# Patient Record
Sex: FEMALE | Race: WHITE | Employment: PART TIME | URBAN - METROPOLITAN AREA
[De-identification: names, ages, dates, MRNs, and addresses within clinical notes are randomized per-mention and may not be internally consistent; named-entity substitution may affect disease eponyms.]

---

## 2018-07-17 ENCOUNTER — HOSPITAL ENCOUNTER (INPATIENT)
Age: 23
LOS: 3 days | Discharge: HOME | DRG: 885 | End: 2018-07-20
Attending: EMERGENCY MEDICINE | Admitting: PSYCHIATRY & NEUROLOGY
Payer: COMMERCIAL

## 2018-07-17 DIAGNOSIS — F39 MOOD DISORDER (HCC): Primary | ICD-10-CM

## 2018-07-17 PROBLEM — F32.A DEPRESSION: Status: ACTIVE | Noted: 2018-07-17

## 2018-07-17 LAB
ACETAMINOPHEN LEVEL: <5 MCG/ML (ref 10–30)
AMPHETAMINE SCREEN, URINE: NOT DETECTED
ANION GAP SERPL CALCULATED.3IONS-SCNC: 12 MMOL/L (ref 7–16)
BARBITURATE SCREEN URINE: NOT DETECTED
BASOPHILS ABSOLUTE: 0.02 E9/L (ref 0–0.2)
BASOPHILS RELATIVE PERCENT: 0.2 % (ref 0–2)
BENZODIAZEPINE SCREEN, URINE: NOT DETECTED
BILIRUBIN URINE: NEGATIVE
BLOOD, URINE: NEGATIVE
BUN BLDV-MCNC: 15 MG/DL (ref 6–20)
CALCIUM SERPL-MCNC: 9.4 MG/DL (ref 8.6–10.2)
CANNABINOID SCREEN URINE: POSITIVE
CHLORIDE BLD-SCNC: 101 MMOL/L (ref 98–107)
CLARITY: CLEAR
CO2: 27 MMOL/L (ref 22–29)
COCAINE METABOLITE SCREEN URINE: NOT DETECTED
COLOR: YELLOW
CREAT SERPL-MCNC: 0.8 MG/DL (ref 0.5–1)
EOSINOPHILS ABSOLUTE: 0.05 E9/L (ref 0.05–0.5)
EOSINOPHILS RELATIVE PERCENT: 0.5 % (ref 0–6)
ETHANOL: <10 MG/DL (ref 0–0.08)
GFR AFRICAN AMERICAN: >60
GFR NON-AFRICAN AMERICAN: >60 ML/MIN/1.73
GLUCOSE BLD-MCNC: 84 MG/DL (ref 74–109)
GLUCOSE URINE: NEGATIVE MG/DL
HCG(URINE) PREGNANCY TEST: NEGATIVE
HCT VFR BLD CALC: 40.3 % (ref 34–48)
HEMOGLOBIN: 13.7 G/DL (ref 11.5–15.5)
IMMATURE GRANULOCYTES #: 0.04 E9/L
IMMATURE GRANULOCYTES %: 0.4 % (ref 0–5)
KETONES, URINE: NEGATIVE MG/DL
LEUKOCYTE ESTERASE, URINE: NEGATIVE
LYMPHOCYTES ABSOLUTE: 2.48 E9/L (ref 1.5–4)
LYMPHOCYTES RELATIVE PERCENT: 23.8 % (ref 20–42)
MCH RBC QN AUTO: 29.7 PG (ref 26–35)
MCHC RBC AUTO-ENTMCNC: 34 % (ref 32–34.5)
MCV RBC AUTO: 87.2 FL (ref 80–99.9)
METHADONE SCREEN, URINE: NOT DETECTED
MONOCYTES ABSOLUTE: 0.73 E9/L (ref 0.1–0.95)
MONOCYTES RELATIVE PERCENT: 7 % (ref 2–12)
NEUTROPHILS ABSOLUTE: 7.12 E9/L (ref 1.8–7.3)
NEUTROPHILS RELATIVE PERCENT: 68.1 % (ref 43–80)
NITRITE, URINE: NEGATIVE
OPIATE SCREEN URINE: NOT DETECTED
PDW BLD-RTO: 11.9 FL (ref 11.5–15)
PH UA: 6 (ref 5–9)
PHENCYCLIDINE SCREEN URINE: NOT DETECTED
PLATELET # BLD: 323 E9/L (ref 130–450)
PMV BLD AUTO: 8.7 FL (ref 7–12)
POTASSIUM REFLEX MAGNESIUM: 4.1 MMOL/L (ref 3.5–5)
PROPOXYPHENE SCREEN: NOT DETECTED
PROTEIN UA: NEGATIVE MG/DL
RBC # BLD: 4.62 E12/L (ref 3.5–5.5)
SALICYLATE, SERUM: <0.3 MG/DL (ref 0–30)
SODIUM BLD-SCNC: 140 MMOL/L (ref 132–146)
SPECIFIC GRAVITY UA: <=1.005 (ref 1–1.03)
T4 TOTAL: 10.5 MCG/DL (ref 4.5–11.7)
TRICYCLIC ANTIDEPRESSANTS SCREEN SERUM: NEGATIVE NG/ML
TSH SERPL DL<=0.05 MIU/L-ACNC: 1.79 UIU/ML (ref 0.27–4.2)
UROBILINOGEN, URINE: 0.2 E.U./DL
WBC # BLD: 10.4 E9/L (ref 4.5–11.5)

## 2018-07-17 PROCEDURE — 81003 URINALYSIS AUTO W/O SCOPE: CPT

## 2018-07-17 PROCEDURE — 81025 URINE PREGNANCY TEST: CPT

## 2018-07-17 PROCEDURE — 80307 DRUG TEST PRSMV CHEM ANLYZR: CPT

## 2018-07-17 PROCEDURE — 80048 BASIC METABOLIC PNL TOTAL CA: CPT

## 2018-07-17 PROCEDURE — G0480 DRUG TEST DEF 1-7 CLASSES: HCPCS

## 2018-07-17 PROCEDURE — 6370000000 HC RX 637 (ALT 250 FOR IP): Performed by: NURSE PRACTITIONER

## 2018-07-17 PROCEDURE — 87088 URINE BACTERIA CULTURE: CPT

## 2018-07-17 PROCEDURE — 84436 ASSAY OF TOTAL THYROXINE: CPT

## 2018-07-17 PROCEDURE — 84443 ASSAY THYROID STIM HORMONE: CPT

## 2018-07-17 PROCEDURE — 1240000000 HC EMOTIONAL WELLNESS R&B

## 2018-07-17 PROCEDURE — 36415 COLL VENOUS BLD VENIPUNCTURE: CPT

## 2018-07-17 PROCEDURE — 99284 EMERGENCY DEPT VISIT MOD MDM: CPT

## 2018-07-17 PROCEDURE — 85025 COMPLETE CBC W/AUTO DIFF WBC: CPT

## 2018-07-17 RX ORDER — ACETAMINOPHEN 325 MG/1
650 TABLET ORAL EVERY 4 HOURS PRN
Status: DISCONTINUED | OUTPATIENT
Start: 2018-07-17 | End: 2018-07-20 | Stop reason: HOSPADM

## 2018-07-17 RX ORDER — MAGNESIUM HYDROXIDE/ALUMINUM HYDROXICE/SIMETHICONE 120; 1200; 1200 MG/30ML; MG/30ML; MG/30ML
30 SUSPENSION ORAL PRN
Status: DISCONTINUED | OUTPATIENT
Start: 2018-07-17 | End: 2018-07-20 | Stop reason: HOSPADM

## 2018-07-17 RX ORDER — TRAZODONE HYDROCHLORIDE 50 MG/1
50 TABLET ORAL NIGHTLY PRN
Status: DISCONTINUED | OUTPATIENT
Start: 2018-07-17 | End: 2018-07-20 | Stop reason: HOSPADM

## 2018-07-17 RX ORDER — NICOTINE 21 MG/24HR
1 PATCH, TRANSDERMAL 24 HOURS TRANSDERMAL DAILY
Status: DISCONTINUED | OUTPATIENT
Start: 2018-07-17 | End: 2018-07-18

## 2018-07-17 RX ORDER — HALOPERIDOL 5 MG/ML
10 INJECTION INTRAMUSCULAR EVERY 6 HOURS PRN
Status: DISCONTINUED | OUTPATIENT
Start: 2018-07-17 | End: 2018-07-20 | Stop reason: HOSPADM

## 2018-07-17 RX ORDER — HYDROXYZINE PAMOATE 50 MG/1
50 CAPSULE ORAL EVERY 6 HOURS PRN
Status: DISCONTINUED | OUTPATIENT
Start: 2018-07-17 | End: 2018-07-20 | Stop reason: HOSPADM

## 2018-07-17 RX ORDER — OLANZAPINE 10 MG/1
10 TABLET ORAL
Status: ACTIVE | OUTPATIENT
Start: 2018-07-17 | End: 2018-07-17

## 2018-07-17 RX ORDER — BENZTROPINE MESYLATE 1 MG/ML
2 INJECTION INTRAMUSCULAR; INTRAVENOUS 2 TIMES DAILY PRN
Status: DISCONTINUED | OUTPATIENT
Start: 2018-07-17 | End: 2018-07-20 | Stop reason: HOSPADM

## 2018-07-17 ASSESSMENT — SLEEP AND FATIGUE QUESTIONNAIRES
DIFFICULTY FALLING ASLEEP: YES
AVERAGE NUMBER OF SLEEP HOURS: 3
DIFFICULTY ARISING: NO
DIFFICULTY STAYING ASLEEP: NO
DO YOU HAVE DIFFICULTY SLEEPING: YES
RESTFUL SLEEP: YES
SLEEP PATTERN: DIFFICULTY FALLING ASLEEP
DO YOU USE A SLEEP AID: YES

## 2018-07-17 ASSESSMENT — LIFESTYLE VARIABLES: HISTORY_ALCOHOL_USE: NO

## 2018-07-17 ASSESSMENT — PAIN SCALES - GENERAL: PAINLEVEL_OUTOF10: 0

## 2018-07-17 NOTE — ED PROVIDER NOTES
1.50 - 4.00 E9/L    Monocytes # 0.73 0.10 - 0.95 E9/L    Eosinophils # 0.05 0.05 - 0.50 E9/L    Basophils # 0.02 0.00 - 0.20 M5/S   Basic Metabolic Panel w/ Reflex to MG   Result Value Ref Range    Sodium 140 132 - 146 mmol/L    Potassium reflex Magnesium 4.1 3.5 - 5.0 mmol/L    Chloride 101 98 - 107 mmol/L    CO2 27 22 - 29 mmol/L    Anion Gap 12 7 - 16 mmol/L    Glucose 84 74 - 109 mg/dL    BUN 15 6 - 20 mg/dL    CREATININE 0.8 0.5 - 1.0 mg/dL    GFR Non-African American >60 >=60 mL/min/1.73    GFR African American >60     Calcium 9.4 8.6 - 10.2 mg/dL   Urinalysis   Result Value Ref Range    Color, UA Yellow Straw/Yellow    Clarity, UA Clear Clear    Glucose, Ur Negative Negative mg/dL    Bilirubin Urine Negative Negative    Ketones, Urine Negative Negative mg/dL    Specific Gravity, UA <=1.005 1.005 - 1.030    Blood, Urine Negative Negative    pH, UA 6.0 5.0 - 9.0    Protein, UA Negative Negative mg/dL    Urobilinogen, Urine 0.2 <2.0 E.U./dL    Nitrite, Urine Negative Negative    Leukocyte Esterase, Urine Negative Negative   Pregnancy, urine   Result Value Ref Range    HCG(Urine) Pregnancy Test NEGATIVE NEGATIVE   TSH without Reflex   Result Value Ref Range    TSH 1.790 0.270 - 4.200 uIU/mL   T4   Result Value Ref Range    T4, Total 10.5 4.5 - 11.7 mcg/dL   Serum Drug Screen   Result Value Ref Range    Ethanol Lvl <10 mg/dL    Acetaminophen Level <5.0 (L) 10.0 - 99.6 mcg/mL    Salicylate, Serum <0.2 0.0 - 30.0 mg/dL    TCA Scrn NEGATIVE Cutoff:300 ng/mL   Urine Drug Screen   Result Value Ref Range    Amphetamine Screen, Urine NOT DETECTED Negative <1000 ng/mL    Barbiturate Screen, Ur NOT DETECTED Negative < 200 ng/mL    Benzodiazepine Screen, Urine NOT DETECTED Negative < 200 ng/mL    Cannabinoid Scrn, Ur POSITIVE (A) Negative < 50ng/mL    Cocaine Metabolite Screen, Urine NOT DETECTED Negative < 300 ng/mL    Opiate Scrn, Ur NOT DETECTED Negative < 300ng/mL    PCP Screen, Urine NOT DETECTED Negative < 25 ng/mL Methadone Screen, Urine NOT DETECTED Negative <300 ng/mL    Propoxyphene Scrn, Ur NOT DETECTED Negative <300 ng/mL   Lipid panel   Result Value Ref Range    Cholesterol, Total 126 0 - 199 mg/dL    Triglycerides 58 0 - 149 mg/dL    HDL 37 >40 mg/dL    LDL Calculated 77 0 - 99 mg/dL    VLDL Cholesterol Calculated 12 mg/dL   Hemoglobin A1c   Result Value Ref Range    Hemoglobin A1C 5.1 4.0 - 5.6 %   Cannabinoid, Urine   Result Value Ref Range    Cannabinoids Conf Ur 220 ng/mL       RADIOLOGY:  Interpreted by Radiologist.  No orders to display             ------------------------- NURSING NOTES AND VITALS REVIEWED ---------------------------   The nursing notes within the ED encounter and vital signs as below have been reviewed by myself. /70   Pulse 69   Temp 98.3 °F (36.8 °C) (Oral)   Resp 12   Ht 5' 6\" (1.676 m)   Wt 136 lb 6 oz (61.9 kg)   LMP 06/23/2018 (Within Days)   SpO2 99%   BMI 22.01 kg/m²   Oxygen Saturation Interpretation: Normal    The patients available past medical records and past encounters were reviewed. ------------------------------ ED COURSE/MEDICAL DECISION MAKING----------------------  Medications   OLANZapine (ZYPREXA) tablet 10 mg (not administered)       Medical Decision Making:    Labs and EKG obtained and reviewed. Consult placed to Social Work. Pt is medically cleared. Disposition as per consultant. This patient's ED course included: a personal history and physicial examination and re-evaluation prior to disposition    This patient has remained hemodynamically stable during their ED course. Re-Evaluations:           Re-evaluation. Patients symptoms show no change    Consultations:             Social Work    Counseling: The emergency provider has spoken with the patient and discussed todays results, in addition to providing specific details for the plan of care and counseling regarding the diagnosis and prognosis.   Questions are answered at this time and they are agreeable with the plan.       --------------------------------- IMPRESSION AND DISPOSITION ---------------------------------    IMPRESSION  1. Mood disorder ([facility] [address]        DISPOSITION  Disposition: as per consultant  Patient condition is stable    7/17/18, 9:06 AM.    This note is prepared by Tomie Goldberg, acting as Scribe for Katarina Peña MD.    Katarina Peña MD:  The scribe's documentation has been prepared under my direction and personally reviewed by me in its entirety. I confirm that the note above accurately reflects all work, treatment, procedures, and medical decision making performed by me.              Katarina Peña MD  07/25/18 6218

## 2018-07-17 NOTE — ED NOTES
PT came to the ED by EMS, pink lsipenayan by [facility] [employer] indicating that pt was unable to make an informed decision and appeared to be manic with erratic thoughts and is unable to care for herself. EMS reported that they picked up pt from home, after she had walked from a gas station in [facility]. EMS said that the boyfriend has dropped pt off last night at the gas station. [place] nurse has been working with pt and assessing for needs. To obtain collateral information, I called mom Gilda Mak, mom [phone]. Mom reports that the family has been trying to get pt properly diagnosed so that \"we can help her\". Pt has no current MH services, treated with [facility] in April 1x and never returned. Recently pt was living with her bf and his family and started to steal pills from them. Pt has a hx of drug use, completed the [employer] in 2015. Mom fears that pt has been using again. Pt has been living with \"different people in different places. \"  Mom reports that pt's bf Maverick Wyman picked her up from a known drug dealers house last evening, spend a few hours with her and then she refused for him to bring her home. Maverick Wyman dropped her off at [facility], because she threatened to kill herself if he took her home. Mom said that pt called her at 2am after she had walked to [employer], and said that she wanted to go get her phone [facility] [employer] [facility] then asked to go home to get sleep. However, mom said \"we have had it\" with her behaviors and they do not permit her to stay in the house without them being home. Mom reports that pt has a hx of talking about ending her life - \"just talk\", but fears that pt is unstable and angry and can benefit from treatment at this time.   Mom said that Maverick Wyman shared with her that there were some concerning text messages that he saw in her phone; \"I have some coke and uppers, meet me downtown\", etc.    Pt has had no previous attempts and no previous

## 2018-07-17 NOTE — ED NOTES
Pt is alert and oriented x4, cooperative. Pt is anxious, tearful at times which she states is due to frustration, not depression. Denies SI/HI. Denies A/V hallucinations. Pt states her mom woke her up early this morning and she only had 2 hours of sleep. Pt denies mental health history. She graduated from [facility] for marijuana use, but still uses marijuana. 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 Pt does not want any contact with her parents at this time. No complaints at this time. Will continue to monitor.       Paola Glynn RN  07/17/18 0332

## 2018-07-17 NOTE — ED NOTES
Bed: [facility]  Expected date:   Expected time:   Means of arrival:   Comments:  ems     Supa García RN  07/17/18 3906

## 2018-07-18 PROBLEM — F31.13 BIPOLAR AFFECTIVE DISORDER, MANIC, SEVERE (HCC): Status: ACTIVE | Noted: 2018-07-18

## 2018-07-18 LAB
CHOLESTEROL, TOTAL: 126 MG/DL (ref 0–199)
HBA1C MFR BLD: 5.1 % (ref 4–5.6)
HDLC SERPL-MCNC: 37 MG/DL
LDL CHOLESTEROL CALCULATED: 77 MG/DL (ref 0–99)
TRIGL SERPL-MCNC: 58 MG/DL (ref 0–149)
VLDLC SERPL CALC-MCNC: 12 MG/DL

## 2018-07-18 PROCEDURE — 36415 COLL VENOUS BLD VENIPUNCTURE: CPT

## 2018-07-18 PROCEDURE — 99222 1ST HOSP IP/OBS MODERATE 55: CPT | Performed by: PSYCHIATRY & NEUROLOGY

## 2018-07-18 PROCEDURE — 83036 HEMOGLOBIN GLYCOSYLATED A1C: CPT

## 2018-07-18 PROCEDURE — 1240000000 HC EMOTIONAL WELLNESS R&B

## 2018-07-18 PROCEDURE — 6370000000 HC RX 637 (ALT 250 FOR IP): Performed by: PSYCHIATRY & NEUROLOGY

## 2018-07-18 PROCEDURE — 80061 LIPID PANEL: CPT

## 2018-07-18 PROCEDURE — 6370000000 HC RX 637 (ALT 250 FOR IP): Performed by: NURSE PRACTITIONER

## 2018-07-18 RX ORDER — RISPERIDONE 0.5 MG/1
0.25 TABLET, FILM COATED ORAL 2 TIMES DAILY
Status: DISCONTINUED | OUTPATIENT
Start: 2018-07-18 | End: 2018-07-19

## 2018-07-18 RX ADMIN — RISPERIDONE 0.25 MG: 0.5 TABLET, FILM COATED ORAL at 22:24

## 2018-07-18 RX ADMIN — NICOTINE POLACRILEX 2 MG: 2 GUM, CHEWING BUCCAL at 20:36

## 2018-07-18 RX ADMIN — NICOTINE POLACRILEX 2 MG: 2 GUM, CHEWING BUCCAL at 11:25

## 2018-07-18 RX ADMIN — RISPERIDONE 0.25 MG: 0.5 TABLET, FILM COATED ORAL at 13:14

## 2018-07-18 RX ADMIN — NICOTINE POLACRILEX 2 MG: 2 GUM, CHEWING BUCCAL at 10:14

## 2018-07-18 RX ADMIN — HYDROXYZINE PAMOATE 50 MG: 50 CAPSULE ORAL at 20:36

## 2018-07-18 RX ADMIN — NICOTINE POLACRILEX 2 MG: 2 GUM, CHEWING BUCCAL at 17:32

## 2018-07-18 RX ADMIN — NICOTINE POLACRILEX 2 MG: 2 GUM, CHEWING BUCCAL at 13:14

## 2018-07-18 RX ADMIN — NICOTINE POLACRILEX 2 MG: 2 GUM, CHEWING BUCCAL at 09:39

## 2018-07-18 ASSESSMENT — LIFESTYLE VARIABLES: HISTORY_ALCOHOL_USE: NO

## 2018-07-18 ASSESSMENT — SLEEP AND FATIGUE QUESTIONNAIRES
RESTFUL SLEEP: NO
DO YOU USE A SLEEP AID: NO
AVERAGE NUMBER OF SLEEP HOURS: 5
DIFFICULTY STAYING ASLEEP: NO
DIFFICULTY ARISING: NO
DO YOU HAVE DIFFICULTY SLEEPING: YES
SLEEP PATTERN: DIFFICULTY FALLING ASLEEP
DIFFICULTY FALLING ASLEEP: YES

## 2018-07-18 ASSESSMENT — PAIN SCALES - GENERAL: PAINLEVEL_OUTOF10: 0

## 2018-07-18 NOTE — H&P
[x] Tongue is midline no deviation or atrophy       For further PE refer to ED note    MENTAL STATUS EXAM:       Mental Status Examination:    Cognition:      [x] Alert  [x] Awake  [x] Oriented  [x] Person  [x] Place [x] Time      [] drowsy  [] tired  [] lethargic  [] distractable  []     Attention/Concentration:   [x] Attentive  [] Distracted        Memory Recent and Remote: [x] Intact   [] Impaired [] Partially Impaired     Language: [] Able to recognize and name objects          [] Unable to recognize and name Objects    Fund of Knowledge:  [] Poor []  Fair  [] Good    Speech: [] Normal  [] Soft  [] Slow  [x] Fast [] Pressured            [] Loud [] Dysarthria  [] Incoherent       Appearance: [] Well Groomed  [] Casual Dressed  [] Unkept  [x] Disheveled          [] Normal weight[] Thin  [] Overweight  [] Obese           Attitude: [] Positive  [] Hostile  [] Demanding  [] Guarded  [] Defensive         [x] Cooperative  []  Uncooperative      Behavior:  [x] Normal Gait  [] Walks with Assistance  [] Katluise Yovana    [] Walks with Scharlene Minors  [] In Hospital Bed  [] Sitting in Chair    Muscle-Skeletal:  [x] Normal Muscle Tone [] Muscle Atrophy       [] Abnormal Muscle Movement     Eye Contact:  [x] Good eye contact  [] Intermittent Eye Contact  [] Poor Eye Contact     Mood: [] Depressed  [x] Anxious  [] Irritated  [] Euthymic   [] Angry [x] Restless    Affect:  [] Congruent  [] Incongruent  [x] Labile  [] Constricted  [] Flat  [] Bizarre     Thought Process and Association:  [] Logical [] Illogical       [] Linear and Goal Directed  [x] Tangential  [] Circumstantial     Thought Content:  [] Denies [] Endorses [] Suicidal [] Homicidal  [x] Delusional      [x] Paranoid  [] Somatic  [] Grandiose    Perception: [x]  None  [] Auditory   [] Visual  [] tactile   [] olfactory  [] Illusions         Insight: [] Intact  [] Fair  [x] Limited    Judgement:  [] Intact  [] Fair  [x] Limited        ASSESSMENT    Patient Active Problem List

## 2018-07-18 NOTE — CARE COORDINATION
SW Note: d/c planning: Sw met with pt to complete biopsychosocial assessment along with C-SSRS. Pts mood was euphoric and affect congruent. Pt is very talkative and can get off topic easily but answered all the questions with no problem. Denies any SI/HI and A/V hallucinations. Pt came to the hospital and was pink slipped by [address] police. Police stated she was acting manic and could not take care of herself. Pt states she takes care of herself perfectly fine, lives with different friends, and has all her basic needs. Pt states going to one appointment in the past because she had to, at [facility] [place]. Pt did like her counselor there and would like to return. Pt is currently a Nanny. Pt does not want her mom or ex, Edwar Garcia, to know information about her at this time. She did give her ex the PIN number and is afraid he gave it to her mother. Pt gave ex permission to come during visiting hours but not her mother. Pt is motivated for treatment but says \"God can help me with everything I need\".     Social Work Intern, Bryson Smart

## 2018-07-19 LAB — URINE CULTURE, ROUTINE: NORMAL

## 2018-07-19 PROCEDURE — 6370000000 HC RX 637 (ALT 250 FOR IP): Performed by: NURSE PRACTITIONER

## 2018-07-19 PROCEDURE — 6370000000 HC RX 637 (ALT 250 FOR IP): Performed by: PSYCHIATRY & NEUROLOGY

## 2018-07-19 PROCEDURE — 1240000000 HC EMOTIONAL WELLNESS R&B

## 2018-07-19 PROCEDURE — 99232 SBSQ HOSP IP/OBS MODERATE 35: CPT | Performed by: PSYCHIATRY & NEUROLOGY

## 2018-07-19 RX ADMIN — HYDROXYZINE PAMOATE 50 MG: 50 CAPSULE ORAL at 20:17

## 2018-07-19 RX ADMIN — SERTRALINE 25 MG: 50 TABLET, FILM COATED ORAL at 11:19

## 2018-07-19 RX ADMIN — NICOTINE POLACRILEX 2 MG: 2 GUM, CHEWING BUCCAL at 10:54

## 2018-07-19 RX ADMIN — NICOTINE POLACRILEX 2 MG: 2 GUM, CHEWING BUCCAL at 16:18

## 2018-07-19 RX ADMIN — NICOTINE POLACRILEX 2 MG: 2 GUM, CHEWING BUCCAL at 08:20

## 2018-07-19 RX ADMIN — NICOTINE POLACRILEX 2 MG: 2 GUM, CHEWING BUCCAL at 18:43

## 2018-07-19 RX ADMIN — NICOTINE POLACRILEX 2 MG: 2 GUM, CHEWING BUCCAL at 23:04

## 2018-07-19 RX ADMIN — HYDROXYZINE PAMOATE 50 MG: 50 CAPSULE ORAL at 10:54

## 2018-07-19 RX ADMIN — NICOTINE POLACRILEX 2 MG: 2 GUM, CHEWING BUCCAL at 20:17

## 2018-07-19 NOTE — PROGRESS NOTES
Patient attended community meeting. Shared daily goal and participated in morning stretch/exercise. Identified goal as \" Give advice to help others positively\". Group Therapy Note    Date: 7/19/2018  Start Time:  11:00 am  End Time:  11:55 am  Number of Participants: 8    Type of Group: Psychoeducation    Wellness Binder Information  Module Name:  Increasing Motivation  Session Number:  N/A    Patient's Goal:  Patient will identify positive changes to increase motivation in wellness recovery. Notes:  Positively participated in discussion. Able to identify one specific action to achieve today. Offered kindness and support to peers. Status After Intervention:  Improved    Participation Level:  Active Listener and Interactive    Participation Quality: Appropriate, Attentive and Sharing      Speech:  normal      Thought Process/Content: Logical      Affective Functioning: Congruent      Mood: euthymic      Level of consciousness:  Alert and Attentive      Response to Learning: Capable of insight, Able to change behavior and Progressing to goal      Endings: None Reported    Modes of Intervention: Education, Support, Socialization and Exploration      Discipline Responsible: Psychoeducational Specialist      Signature:  Sharad Laguerre, [address]

## 2018-07-19 NOTE — PROGRESS NOTES
Date: 7/19/2018  Start Time: 100  End Time:  150  Number of Participants: 10     Type of Group: Psychotherapy     Wellness Binder Information  Module Name:  n/a  Session Number: n/a     Patient's Goal: To increase social interaction and relationships with others.     Notes:  Pt was active and verbal during group. He was able to relate to other members and provide support as well as first hand experience regarding relationships and forgiveness.     Status After Intervention:  Improved     Participation Level:  Active Listener     Participation Quality: Appropriate, Attentive, Sharing and Supportive     Speech:  normal     Thought Process/Content: Logical     Affective Functioning: Congruent     Mood: euthymic     Level of consciousness:  Alert, Oriented x4 and Attentive     Response to Learning: Able to verbalize current knowledge/experience, Able to retain information and Capable of insight     Endings: None Reported     Modes of Intervention: Support and Socialization     Discipline Responsible: /Counselor

## 2018-07-20 VITALS
SYSTOLIC BLOOD PRESSURE: 113 MMHG | RESPIRATION RATE: 12 BRPM | HEART RATE: 69 BPM | DIASTOLIC BLOOD PRESSURE: 70 MMHG | HEIGHT: 66 IN | BODY MASS INDEX: 21.92 KG/M2 | OXYGEN SATURATION: 99 % | WEIGHT: 136.38 LBS | TEMPERATURE: 98.3 F

## 2018-07-20 PROCEDURE — 99238 HOSP IP/OBS DSCHRG MGMT 30/<: CPT | Performed by: PSYCHIATRY & NEUROLOGY

## 2018-07-20 PROCEDURE — 6370000000 HC RX 637 (ALT 250 FOR IP): Performed by: PSYCHIATRY & NEUROLOGY

## 2018-07-20 PROCEDURE — 6370000000 HC RX 637 (ALT 250 FOR IP): Performed by: NURSE PRACTITIONER

## 2018-07-20 RX ORDER — SERTRALINE HYDROCHLORIDE 25 MG/1
25 TABLET, FILM COATED ORAL DAILY
Qty: 30 TABLET | Refills: 0 | Status: SHIPPED | OUTPATIENT
Start: 2018-07-21 | End: 2022-08-09

## 2018-07-20 RX ORDER — HYDROXYZINE PAMOATE 50 MG/1
50 CAPSULE ORAL EVERY 6 HOURS PRN
Qty: 60 CAPSULE | Refills: 0 | Status: SHIPPED | OUTPATIENT
Start: 2018-07-20 | End: 2018-08-03

## 2018-07-20 RX ADMIN — NICOTINE POLACRILEX 2 MG: 2 GUM, CHEWING BUCCAL at 13:09

## 2018-07-20 RX ADMIN — SERTRALINE 25 MG: 50 TABLET, FILM COATED ORAL at 08:00

## 2018-07-20 RX ADMIN — HYDROXYZINE PAMOATE 50 MG: 50 CAPSULE ORAL at 08:31

## 2018-07-20 RX ADMIN — NICOTINE POLACRILEX 2 MG: 2 GUM, CHEWING BUCCAL at 10:52

## 2018-07-20 RX ADMIN — NICOTINE POLACRILEX 2 MG: 2 GUM, CHEWING BUCCAL at 08:31

## 2018-07-20 NOTE — PROGRESS NOTES
[facility]  Discharge Note    Pt discharged with followings belongings:   Dentures: None  Vision - Corrective Lenses: None  Hearing Aid: None  Jewelry: Ring, Earrings  Body Piercings Removed: N/A  Clothing: Other (Comment) (necklace rope)  Were All Patient Medications Collected?: Not Applicable  Other Valuables: Other (Comment)   Valuables sent home with yes. Valuables retrieved from safe, Security envelope number:  n/a and returned to patient. Patient left department with Departure Mode: With parents via Mobility at Departure: Ambulatory, discharged to Discharged to: Private Residence. Patient education on aftercare instructions: yes  Information faxed to n/a by n/a Patient verbalize understanding of AVS:  yes.     Status EXAM upon discharge:  Status and Exam  Normal: No  Facial Expression: Worried  Affect: Congruent  Level of Consciousness: Alert  Mood:Normal: No  Mood: Anxious  Motor Activity:Normal: Yes  Motor Activity: Decreased  Interview Behavior: Cooperative  Preception: [place] to Person, [place] to Time, [place] to Place, [place] to Situation  Attention:Normal: Yes  Attention: Distractible  Thought Processes: Circumstantial  Thought Content:Normal: Yes  Thought Content: Ideas of Influence  Hallucinations: None  Delusions: No  Memory:Normal: Yes  Insight and Judgment: No  Insight and Judgment: Poor Insight  Present Suicidal Ideation: No  Present Homicidal Ideation: No    Aicha Hodges RN

## 2018-07-20 NOTE — PROGRESS NOTES
Group Therapy Note    Date: 7/20/2018  Start Time: 1000  End Time:  1050  Number of Participants:  13    Type of Group: Psychotherapy    Wellness Binder Information  Module Name:  n/a  Session Number:  n/a    Patient's Goal:  To increase social interaction and relationships with others. Notes:  Pt was active and verbal during group. Pt was able to relate to other members. Status After Intervention:  Improved    Participation Level:  Active Listener and Interactive    Participation Quality: Appropriate, Attentive, Sharing and Supportive      Speech:  normal      Thought Process/Content: Logical      Affective Functioning: Congruent      Mood: euthymic      Level of consciousness:  Alert, Oriented x4 and Attentive      Response to Learning: Able to verbalize current knowledge/experience and Able to retain information      Endings: None Reported    Modes of Intervention: Support and Socialization      Discipline Responsible: /Counselor      Signature:  KATY Holt, BERHANE

## 2018-07-20 NOTE — CARE COORDINATION
Phone call to pts father who stated he does have some concerns of pts drug use but that they are hoping to address this once she comes home. Pts father is willing to pick her up at 0 and will allow her to stay at the family home. sw informed him of pts upcoming appts.

## 2018-07-20 NOTE — PLAN OF CARE
[address] [place] [address]  Day 3 Interdisciplinary Treatment Plan NOTE    Review Date & Time: 07/20/18    Patient was in treatment team    Admission Type:   Admission Type: Involuntary    Reason for admission:  Reason for Admission: \"my mom woke me up said she would take me somewhere. I didn't want to lose my wifi. I wanted to go somewhere safe before I left there. Then she called the . \"   Estimated Length of Stay Update:  5-7 days  Estimated Discharge Date Update: 7/25/18    PATIENT STRENGTHS:  Patient Strengths Strengths: Communication, Motivated, Social Skills  Patient Strengths and Limitations:Limitations: Unrealistic self-view  Addictive Behavior:Addictive Behavior  In the past 3 months, have you felt or has someone told you that you have a problem with:  : None  Do you have a history of Chemical Use?: No  Do you have a history of Alcohol Use?: No  Do you have a history of Street Drug Abuse?: Yes  Histroy of Prescripton Drug Abuse?: No  Medical Problems:  Past Medical History:   Diagnosis Date    Pneumonia        Risk:  Fall RiskTotal: 65  Clint Scale Clint Scale Score: 22  BVC Total: 0  Change in scores No. Changes to plan of Care  No    Status EXAM:   Status and Exam  Normal: No  Facial Expression: Worried  Affect: Congruent  Level of Consciousness: Alert  Mood:Normal: No  Mood: Anxious  Motor Activity:Normal: Yes  Motor Activity: Decreased  Interview Behavior: Cooperative  Preception: [place] to Person, [place] to Time, [place] to Place, [place] to Situation  Attention:Normal: Yes  Attention: Distractible  Thought Processes: Circumstantial  Thought Content:Normal: Yes  Thought Content: Ideas of Influence  Hallucinations: None  Delusions: No  Memory:Normal: Yes  Insight and Judgment: No  Insight and Judgment: Poor Insight  Present Suicidal Ideation: No  Present Homicidal Ideation: No    Daily Assessment Last Entry:   Daily Sleep (WDL): Within Defined Limits         Patient Currently in Pain: Unable to Assess (Resting in bed apparently asleep)  Daily Nutrition (WDL): Within Defined Limits    Patient Monitoring:  Frequency of Checks: 4 times per hour, close    Psychiatric Symptoms:   Depression Symptoms  Depression Symptoms: No problems reported or observed. Anxiety Symptoms  Anxiety Symptoms: Generalized  Elaine Symptoms  Elaine Symptoms: No problems reported or observed. Psychosis Symptoms  Hallucination Type: No problems reported or observed. Delusion Type: No problems reported or observed. Suicide Risk CSSR-S:  1) Within the past month, have you wished you were dead or wished you could go to sleep and not wake up? : NO  2) Within the past month, have you actually had any thoughts of killing yourself? : NO  6)  Have you ever done anything, started to do anything, or prepared to do anything to end your life?: NO  Change in Result No Change in Plan of care No      EDUCATION:   Learner Progress Toward Treatment Goals: Reviewed results and recommendations of this team, Reviewed group plan and strategies, Reviewed signs, symptoms and risk of self harm and violent behavior and Reviewed goals and plan of care    Method: Individual    Outcome: Verbalized understanding and Demonstrated Understanding    PATIENT GOALS: \"keep anxiety down and help others with anxiety\"    PLAN/TREATMENT RECOMMENDATIONS UPDATE: offer and encourage groups and provide emotional support. GOALS UPDATE:   Time frame for Short-Term Goals:   One week        Kasandra aRy RN

## 2018-07-20 NOTE — PLAN OF CARE
Problem: Altered Mood, Depressive Behavior:  Goal: Able to verbalize acceptance of life and situations over which he or she has no control  Able to verbalize acceptance of life and situations over which he or she has no control   Outcome: Ongoing  Pt resting in bed apparently asleep with easy even respirations at  q 15 min electronic rounding. 11-7am shift note:  Pt observed to be asleep with easy even respirations at  rounds since 2300    Problem: Depressive Behavior With or Without Suicide Precautions:  Goal: Able to verbalize acceptance of life and situations over which he or she has no control  Able to verbalize acceptance of life and situations over which he or she has no control   Outcome: Ongoing  Pt resting in bed apparently asleep with easy even respirations at  q 15 min electronic rounding.   11-7am shift note:  Pt observed to be asleep with easy even respirations at  rounds since 2300

## 2018-07-20 NOTE — PLAN OF CARE
Problem: Altered Mood, Depressive Behavior:  Goal: Able to verbalize acceptance of life and situations over which he or she has no control  Able to verbalize acceptance of life and situations over which he or she has no control   Outcome: Met This Shift    Goal: Able to verbalize and/or display a decrease in depressive symptoms  Able to verbalize and/or display a decrease in depressive symptoms   Outcome: Met This Shift      Problem: Depressive Behavior With or Without Suicide Precautions:  Goal: Able to verbalize acceptance of life and situations over which he or she has no control  Able to verbalize acceptance of life and situations over which he or she has no control   Outcome: Met This Shift      Comments: Pt. Denies SI, HI, and hallucinations this shift. Pt. Denies physical complaints currently.

## 2018-07-20 NOTE — PLAN OF CARE
Problem: Altered Mood, Depressive Behavior:  Goal: Able to verbalize acceptance of life and situations over which he or she has no control  Able to verbalize acceptance of life and situations over which he or she has no control   Outcome: Ongoing      Problem: Depressive Behavior With or Without Suicide Precautions:  Goal: Able to verbalize acceptance of life and situations over which he or she has no control  Able to verbalize acceptance of life and situations over which he or she has no control   Outcome: Ongoing      Comments: 7p- 11p note    Patient up and active on unit, appropriately groomed and attired. Patient is bright, friendly and sociable. Positive and appropriate interactions with staff and peers. Calm and cooperative with staff and care. Denies thoughts or intent to harm self or others at this time. Denies audio or visual hallucinations at this time. Reports no concerns or complaints, no signs or symptoms of distress or discomfort. Able to maintain control of behaviors and emotions. Patient is encouraged to continue to work towards discharge goal by complying with medications, attending groups and to seek staff if feelings are overwhelming. Environmental rounds completed per unit policy to maintain safety of everyone on the unit. Staff will offer support and interventions as requested or required.

## 2018-07-21 LAB — CANNABINOIDS CONF, URINE: 220 NG/ML

## 2020-06-26 ENCOUNTER — HOSPITAL ENCOUNTER (OUTPATIENT)
Age: 25
Discharge: HOME | End: 2020-06-28
Payer: COMMERCIAL

## 2020-06-26 PROCEDURE — U0003 INFECTIOUS AGENT DETECTION BY NUCLEIC ACID (DNA OR RNA); SEVERE ACUTE RESPIRATORY SYNDROME CORONAVIRUS 2 (SARS-COV-2) (CORONAVIRUS DISEASE [COVID-19]), AMPLIFIED PROBE TECHNIQUE, MAKING USE OF HIGH THROUGHPUT TECHNOLOGIES AS DESCRIBED BY CMS-2020-01-R: HCPCS

## 2020-06-27 LAB
SARS-COV-2: NOT DETECTED
SOURCE: NORMAL

## 2022-04-29 ENCOUNTER — HOSPITAL ENCOUNTER (EMERGENCY)
Age: 27
Discharge: HOME | End: 2022-04-29
Payer: MEDICAID

## 2022-04-29 VITALS
BODY MASS INDEX: 24.91 KG/M2 | HEIGHT: 66 IN | WEIGHT: 155 LBS | SYSTOLIC BLOOD PRESSURE: 112 MMHG | DIASTOLIC BLOOD PRESSURE: 74 MMHG | RESPIRATION RATE: 16 BRPM | OXYGEN SATURATION: 98 % | TEMPERATURE: 98.1 F | HEART RATE: 101 BPM

## 2022-04-29 DIAGNOSIS — L60.0 INGROWN TOENAIL: Primary | ICD-10-CM

## 2022-04-29 PROCEDURE — 99283 EMERGENCY DEPT VISIT LOW MDM: CPT

## 2022-04-29 RX ORDER — LISDEXAMFETAMINE DIMESYLATE 60 MG/1
CAPSULE ORAL
COMMUNITY
End: 2022-08-01 | Stop reason: ALTCHOICE

## 2022-04-29 RX ORDER — BUPROPION HYDROCHLORIDE 300 MG/1
300 TABLET ORAL EVERY MORNING
COMMUNITY
End: 2022-08-09

## 2022-04-29 RX ORDER — CEPHALEXIN 500 MG/1
500 CAPSULE ORAL 3 TIMES DAILY
Qty: 30 CAPSULE | Refills: 0 | Status: SHIPPED | OUTPATIENT
Start: 2022-04-29 | End: 2022-05-09

## 2022-04-29 ASSESSMENT — PAIN DESCRIPTION - DESCRIPTORS: DESCRIPTORS: ACHING;DISCOMFORT;SORE

## 2022-04-29 ASSESSMENT — PAIN DESCRIPTION - ORIENTATION: ORIENTATION: RIGHT

## 2022-04-29 ASSESSMENT — PAIN - FUNCTIONAL ASSESSMENT: PAIN_FUNCTIONAL_ASSESSMENT: 0-10

## 2022-04-29 ASSESSMENT — PAIN SCALES - GENERAL: PAINLEVEL_OUTOF10: 5

## 2022-04-29 ASSESSMENT — PAIN DESCRIPTION - LOCATION: LOCATION: TOE (COMMENT WHICH ONE)

## 2022-04-30 NOTE — ED NOTES
2x2 lightly placed onto R great toe and post op show applied as per order     Zion Tan, TYREE  04/29/22 2514

## 2022-04-30 NOTE — ED PROVIDER NOTES
[facility]  Department of Emergency Medicine   ED  Encounter Note  Admit Date/RoomTime: 2022  8:33 PM  ED Room:     NAME: Fiorella Silva  : 1995by private vehicleMRN: [id number]     Chief Complaint:  Wound Check (ingrown toenail right for a few days; now infected )    History of Present Illness       Fiorella Silva is a 32 y.o. old female presenting to the emergency department by private vehiclefor non-traumatic right pain which occured several day(s) prior to arrival.  The complaint is due to no known injury. Since onset the symptoms have been waxing and waning. Patient has no prior history of pain/injury with regards to today's visit. Her pain is aggraveated by movement and palpation and relieved by Epson salt soaks and Neosporin ointment. Patient states that she believes she cut her toenails too close and now has an ingrown toenail. Patient states that it is painful to palpation and to walk and to put her shoes on. She states that elevating her feet and Epson salt soaks to help her symptoms. .   Tetanus Status: up to date. ROS   Pertinent positives and negatives are stated within HPI, all other systems reviewed and are negative. Past Medical History:  has a past medical history of Pneumonia. Surgical History:  has a past surgical history that includes Tympanostomy tube placement and [place] tooth extraction. Social History:  reports that she has been smoking cigarettes. She has been smoking about 0.10 packs per day. She has never used smokeless tobacco. She reports current drug use. Drug: Marijuana [place]). She reports that she does not drink alcohol. Family History: family history is not on file.      Allergies: Dust mite extract and Pollen extract    Physical Exam   Oxygen Saturation Interpretation: Normal.        ED Triage Vitals   BP Temp Temp Source Pulse Resp SpO2 Height Weight   22 04/29/22 2033 04/29/22 2033 04/29/22 2033 04/29/22 2037 04/29/22 2037   112/74 98.1 °F (36.7 °C) Oral 101 16 98 % 5' 6\" (1.676 m) 155 lb (70.3 kg)         Constitutional:  Alert, development consistent with age. Neck:  Normal ROM. Supple. Right Toe(s):  great toe nail medial nail fold ingrown toenail            Tenderness: moderate. Swelling: Mild. Deformity: no deformity observed/palpated. ROM: full range of motion. Skin:  erythema. Neurovascular: Motor deficit: none. Sensory deficit: none. Pulse deficit: none. Capillary refill: normal.  Right Foot:             Tenderness:  none. Swelling: None. Deformity: no deformity observed/palpated. ROM: full range of motion. Skin:  no wounds, erythema, or swelling. Gait:  normal.  Lymphatics: No lymphangitis or adenopathy noted. Neurological:  Oriented. Motor functions intact. .    Lab / Imaging Results   (All laboratory and radiology results have been personally reviewed by myself)  Labs:  No results found for this visit on 04/29/22. Imaging: All Radiology results interpreted by Radiologist unless otherwise noted. No orders to display     ED Course / Medical Decision Making   Medications - No data to display         Consult(s):   None    Procedure(s):  None    MDM:    Imaging was obtained based on low suspicion for fracture / bony abnormality as per history/physical findings. Plan is subsequently for symptom control, limited use and immobilization with outpatient follow-up podiatry. Patient was also placed on topical antibiotics. She is educated to continue doing Epson salt soaks elevate and her helping to air when she is home or has her feet elevated. Patient verbalized understand she is agreeable to plan of care. At this time the patient is without objective evidence of an acute process requiring hospitalization or inpatient management. They have remained hemodynamically stable throughout their entire ED visit and are stable for discharge with outpatient follow-up. The plan has been discussed in detail and they are aware of the specific conditions for emergent return, as well as the importance of follow-up. Plan of Care/Counseling:  MUNDO Zhu CNP reviewed today's visit with the patient in addition to providing specific details for the plan of care and counseling regarding the diagnosis and prognosis. Questions are answered at this time and are agreeable with the plan. Assessment      1. Ingrown toenail      Plan   Discharged home. Patient condition is good    New Medications     Discharge Medication List as of 4/29/2022  9:00 PM      START taking these medications    Details   mupirocin (BACTROBAN) 2 % ointment Apply topically 3 times daily. , Disp-30 g, R-0, Normal      cephALEXin (KEFLEX) 500 MG capsule Take 1 capsule by mouth 3 times daily for 10 days, Disp-30 capsule, R-0Normal           Electronically signed by MUNDO Zhu CNP   DD: 4/29/22  **This report was transcribed using voice recognition software. Every effort was made to ensure accuracy; however, inadvertent computerized transcription errors may be present.   END OF ED PROVIDER NOTE       MUNDO Francois CNP  04/29/22 2452

## 2022-08-01 ENCOUNTER — HOSPITAL ENCOUNTER (INPATIENT)
Age: 27
LOS: 8 days | Discharge: HOME | DRG: 885 | End: 2022-08-09
Attending: EMERGENCY MEDICINE | Admitting: PSYCHIATRY & NEUROLOGY
Payer: COMMERCIAL

## 2022-08-01 DIAGNOSIS — F69 BEHAVIOR CONCERN IN ADULT: Primary | ICD-10-CM

## 2022-08-01 DIAGNOSIS — Z91.14 NONCOMPLIANCE WITH MEDICATION REGIMEN: ICD-10-CM

## 2022-08-01 PROBLEM — F31.11 BIPOLAR 1 DISORDER, MANIC, MILD (HCC): Status: ACTIVE | Noted: 2022-08-01

## 2022-08-01 LAB
ACETAMINOPHEN LEVEL: <5 MCG/ML (ref 10–30)
ALBUMIN SERPL-MCNC: 4.7 G/DL (ref 3.5–5.2)
ALP BLD-CCNC: 76 U/L (ref 35–104)
ALT SERPL-CCNC: 10 U/L (ref 0–32)
AMPHETAMINE SCREEN, URINE: NOT DETECTED
ANION GAP SERPL CALCULATED.3IONS-SCNC: 16 MMOL/L (ref 7–16)
AST SERPL-CCNC: 15 U/L (ref 0–31)
BARBITURATE SCREEN URINE: NOT DETECTED
BASOPHILS ABSOLUTE: 0.01 E9/L (ref 0–0.2)
BASOPHILS RELATIVE PERCENT: 0.2 % (ref 0–2)
BENZODIAZEPINE SCREEN, URINE: NOT DETECTED
BILIRUB SERPL-MCNC: 0.3 MG/DL (ref 0–1.2)
BUN BLDV-MCNC: 4 MG/DL (ref 6–20)
CALCIUM SERPL-MCNC: 9.4 MG/DL (ref 8.6–10.2)
CANNABINOID SCREEN URINE: POSITIVE
CHLORIDE BLD-SCNC: 100 MMOL/L (ref 98–107)
CO2: 24 MMOL/L (ref 22–29)
COCAINE METABOLITE SCREEN URINE: NOT DETECTED
CREAT SERPL-MCNC: 0.8 MG/DL (ref 0.5–1)
EOSINOPHILS ABSOLUTE: 0 E9/L (ref 0.05–0.5)
EOSINOPHILS RELATIVE PERCENT: 0 % (ref 0–6)
ETHANOL: <10 MG/DL (ref 0–0.08)
FENTANYL SCREEN, URINE: NOT DETECTED
GFR AFRICAN AMERICAN: >60
GFR NON-AFRICAN AMERICAN: >60 ML/MIN/1.73
GLUCOSE BLD-MCNC: 113 MG/DL (ref 74–99)
HCG, URINE, POC: NEGATIVE
HCT VFR BLD CALC: 40 % (ref 34–48)
HEMOGLOBIN: 13.5 G/DL (ref 11.5–15.5)
IMMATURE GRANULOCYTES #: 0.01 E9/L
IMMATURE GRANULOCYTES %: 0.2 % (ref 0–5)
INFLUENZA A: NOT DETECTED
INFLUENZA B: NOT DETECTED
LYMPHOCYTES ABSOLUTE: 1.16 E9/L (ref 1.5–4)
LYMPHOCYTES RELATIVE PERCENT: 23.7 % (ref 20–42)
Lab: ABNORMAL
Lab: NORMAL
MCH RBC QN AUTO: 28.8 PG (ref 26–35)
MCHC RBC AUTO-ENTMCNC: 33.8 % (ref 32–34.5)
MCV RBC AUTO: 85.3 FL (ref 80–99.9)
METHADONE SCREEN, URINE: NOT DETECTED
MONOCYTES ABSOLUTE: 0.42 E9/L (ref 0.1–0.95)
MONOCYTES RELATIVE PERCENT: 8.6 % (ref 2–12)
NEGATIVE QC PASS/FAIL: NORMAL
NEUTROPHILS ABSOLUTE: 3.29 E9/L (ref 1.8–7.3)
NEUTROPHILS RELATIVE PERCENT: 67.3 % (ref 43–80)
OPIATE SCREEN URINE: NOT DETECTED
OXYCODONE URINE: NOT DETECTED
PDW BLD-RTO: 11.6 FL (ref 11.5–15)
PHENCYCLIDINE SCREEN URINE: NOT DETECTED
PLATELET # BLD: 274 E9/L (ref 130–450)
PMV BLD AUTO: 8.3 FL (ref 7–12)
POSITIVE QC PASS/FAIL: NORMAL
POTASSIUM SERPL-SCNC: 4 MMOL/L (ref 3.5–5)
RBC # BLD: 4.69 E12/L (ref 3.5–5.5)
SALICYLATE, SERUM: <0.3 MG/DL (ref 0–30)
SARS-COV-2 RNA, RT PCR: DETECTED
SODIUM BLD-SCNC: 140 MMOL/L (ref 132–146)
TOTAL PROTEIN: 7.9 G/DL (ref 6.4–8.3)
TRICYCLIC ANTIDEPRESSANTS SCREEN SERUM: NEGATIVE NG/ML
WBC # BLD: 4.9 E9/L (ref 4.5–11.5)

## 2022-08-01 PROCEDURE — 82077 ASSAY SPEC XCP UR&BREATH IA: CPT

## 2022-08-01 PROCEDURE — 99285 EMERGENCY DEPT VISIT HI MDM: CPT

## 2022-08-01 PROCEDURE — 80143 DRUG ASSAY ACETAMINOPHEN: CPT

## 2022-08-01 PROCEDURE — 80053 COMPREHEN METABOLIC PANEL: CPT

## 2022-08-01 PROCEDURE — 85025 COMPLETE CBC W/AUTO DIFF WBC: CPT

## 2022-08-01 PROCEDURE — 1240000000 HC EMOTIONAL WELLNESS R&B

## 2022-08-01 PROCEDURE — 80179 DRUG ASSAY SALICYLATE: CPT

## 2022-08-01 PROCEDURE — 80307 DRUG TEST PRSMV CHEM ANLYZR: CPT

## 2022-08-01 PROCEDURE — 93005 ELECTROCARDIOGRAM TRACING: CPT | Performed by: EMERGENCY MEDICINE

## 2022-08-01 PROCEDURE — 6370000000 HC RX 637 (ALT 250 FOR IP): Performed by: PSYCHIATRY & NEUROLOGY

## 2022-08-01 PROCEDURE — 87636 SARSCOV2 & INF A&B AMP PRB: CPT

## 2022-08-01 RX ORDER — MIDAZOLAM HYDROCHLORIDE 2 MG/2ML
2 INJECTION, SOLUTION INTRAMUSCULAR; INTRAVENOUS ONCE
Status: DISCONTINUED | OUTPATIENT
Start: 2022-08-01 | End: 2022-08-09 | Stop reason: HOSPADM

## 2022-08-01 RX ORDER — ZIPRASIDONE MESYLATE 20 MG/ML
20 INJECTION, POWDER, LYOPHILIZED, FOR SOLUTION INTRAMUSCULAR ONCE
Status: DISCONTINUED | OUTPATIENT
Start: 2022-08-01 | End: 2022-08-09 | Stop reason: HOSPADM

## 2022-08-01 RX ORDER — LANOLIN ALCOHOL/MO/W.PET/CERES
3 CREAM (GRAM) TOPICAL NIGHTLY
Status: DISCONTINUED | OUTPATIENT
Start: 2022-08-01 | End: 2022-08-09 | Stop reason: HOSPADM

## 2022-08-01 RX ORDER — DIPHENHYDRAMINE HYDROCHLORIDE 50 MG/ML
25 INJECTION INTRAMUSCULAR; INTRAVENOUS ONCE
Status: DISCONTINUED | OUTPATIENT
Start: 2022-08-01 | End: 2022-08-09 | Stop reason: HOSPADM

## 2022-08-01 RX ORDER — HYDROXYZINE PAMOATE 50 MG/1
50 CAPSULE ORAL 3 TIMES DAILY PRN
Status: DISCONTINUED | OUTPATIENT
Start: 2022-08-01 | End: 2022-08-09 | Stop reason: HOSPADM

## 2022-08-01 RX ORDER — ARIPIPRAZOLE 5 MG/1
5 TABLET ORAL DAILY
Status: ON HOLD | COMMUNITY
End: 2022-08-09 | Stop reason: HOSPADM

## 2022-08-01 RX ORDER — LISDEXAMFETAMINE DIMESYLATE 60 MG/1
60 CAPSULE ORAL DAILY
Status: ON HOLD | COMMUNITY
End: 2022-08-09 | Stop reason: HOSPADM

## 2022-08-01 RX ORDER — HALOPERIDOL 5 MG
5 TABLET ORAL EVERY 6 HOURS PRN
Status: DISCONTINUED | OUTPATIENT
Start: 2022-08-01 | End: 2022-08-09 | Stop reason: HOSPADM

## 2022-08-01 RX ORDER — MAGNESIUM HYDROXIDE/ALUMINUM HYDROXICE/SIMETHICONE 120; 1200; 1200 MG/30ML; MG/30ML; MG/30ML
30 SUSPENSION ORAL PRN
Status: DISCONTINUED | OUTPATIENT
Start: 2022-08-01 | End: 2022-08-09 | Stop reason: HOSPADM

## 2022-08-01 RX ORDER — ACETAMINOPHEN 325 MG/1
650 TABLET ORAL EVERY 6 HOURS PRN
Status: DISCONTINUED | OUTPATIENT
Start: 2022-08-01 | End: 2022-08-09 | Stop reason: HOSPADM

## 2022-08-01 RX ORDER — NICOTINE 21 MG/24HR
1 PATCH, TRANSDERMAL 24 HOURS TRANSDERMAL DAILY
Status: DISCONTINUED | OUTPATIENT
Start: 2022-08-01 | End: 2022-08-06

## 2022-08-01 RX ORDER — HALOPERIDOL 5 MG/ML
5 INJECTION INTRAMUSCULAR EVERY 6 HOURS PRN
Status: DISCONTINUED | OUTPATIENT
Start: 2022-08-01 | End: 2022-08-09 | Stop reason: HOSPADM

## 2022-08-01 RX ADMIN — HYDROXYZINE PAMOATE 50 MG: 50 CAPSULE ORAL at 20:42

## 2022-08-01 RX ADMIN — MELATONIN 3 MG ORAL TABLET 3 MG: 3 TABLET ORAL at 20:42

## 2022-08-01 ASSESSMENT — LIFESTYLE VARIABLES
HOW OFTEN DO YOU HAVE A DRINK CONTAINING ALCOHOL: 2-3 TIMES A WEEK
HOW MANY STANDARD DRINKS CONTAINING ALCOHOL DO YOU HAVE ON A TYPICAL DAY: 1 OR 2

## 2022-08-01 ASSESSMENT — PAIN - FUNCTIONAL ASSESSMENT: PAIN_FUNCTIONAL_ASSESSMENT: NONE - DENIES PAIN

## 2022-08-01 ASSESSMENT — SLEEP AND FATIGUE QUESTIONNAIRES
DO YOU USE A SLEEP AID: COMMENT
AVERAGE NUMBER OF SLEEP HOURS: 7
DO YOU HAVE DIFFICULTY SLEEPING: NO

## 2022-08-01 NOTE — ED NOTES
Pt finally agreed to cooperate. Changed into gown and scrub bottoms, placed belongings (shift, sweatpants, fleece jacket) into bag. Pt provided urine sample; this RN collected blood work and COVID/Flu screen. Sent workup to lab. EKG pending.      Paulina Keller RN  08/01/22 6508

## 2022-08-01 NOTE — ED NOTES
Patient asking multiple questions, refuses to cooperate with staff.      Jamie Salazar RN  08/01/22 1295

## 2022-08-01 NOTE — ED PROVIDER NOTES
Department of Emergency Medicine   ED  Provider Note  Admit Date/RoomTime: 8/1/2022  2:03 PM  ED Room: [address] [place] [address]          History of Present Illness:  8/1/22, Time: 2:06 PM EDT  Chief Complaint   Patient presents with    Psychiatric Evaluation     EMS called for psych eval . Patient has not been taking psych medication. Being combative / aggreesive. Patient denies any SI/HI                Christopher Sol is a 32 y.o. female presenting to the ED for mental health evaluation, beginning earlier today. Patient states that she currently takes Wellbutrin and Vyvanse, feels that the Vyvanse is not working. She spoke with her psychiatric nurse practitioner today who prescribed her Abilify. She states that while she was sitting at her cousin's house the police and EMS arrived and brought patient to ED. Patient denies any suicidal or homicidal ideations, states she is taking her medication appropriately. There was no report given by EMS, unsure of any other events that led up to the patient being transported to ED. We will attempt to gather further information. Review of Systems:   Pertinent positives and negatives are stated within HPI, all other systems reviewed and are negative.        --------------------------------------------- PAST HISTORY ---------------------------------------------  Past Medical History:  has a past medical history of Pneumonia. Past Surgical History:  has a past surgical history that includes Tympanostomy tube placement and [place] tooth extraction. Social History:  reports that she has been smoking cigarettes. She has been smoking an average of .1 packs per day. She has never used smokeless tobacco. She reports current drug use. Drug: Marijuana [place]). She reports that she does not drink alcohol. Family History: family history is not on file. . Unless otherwise noted, family history is non contributory    The patients home medications have been reviewed.     Allergies: Dust mite extract and Pollen extract        ---------------------------------------------------PHYSICAL EXAM--------------------------------------    Constitutional/General: Alert and oriented x3  Head: Normocephalic and atraumatic  Eyes: PERRL, EOMI, sclera non icteric  Mouth: Oropharynx clear, handling secretions, no trismus, no asymmetry of the posterior oropharynx or uvular edema  Neck: Supple, full ROM, no stridor, no meningeal signs  Respiratory: Lungs clear to auscultation bilaterally, no wheezes, rales, or rhonchi. Not in respiratory distress  Cardiovascular:  Regular rate. Regular rhythm. No murmurs, no aortic murmurs, no gallops, or rubs. 2+ distal pulses. Equal extremity pulses. Chest: No chest wall tenderness  GI:  Abdomen Soft, Non tender, Non distended. No rebound, guarding, or rigidity. No pulsatile masses. Musculoskeletal: Moves all extremities x 4. Warm and well perfused, no clubbing, cyanosis, or edema. Capillary refill <3 seconds  Integument: skin warm and dry. No rashes. Neurologic: GCS 15, no focal deficits, symmetric strength 5/5 in the upper and lower extremities bilaterally  Psychiatric: Normal Affect, cooperative, no suicidal or homicidal ideation          -------------------------------------------------- RESULTS -------------------------------------------------  I have personally reviewed all laboratory and imaging results for this patient. Results are listed below.      LABS: (Lab results interpreted by me)  Results for orders placed or performed during the hospital encounter of 08/01/22   COVID-19 & Influenza Combo    Specimen: Nasopharyngeal Swab   Result Value Ref Range    SARS-CoV-2 RNA, RT PCR DETECTED (A) NOT DETECTED    INFLUENZA A NOT DETECTED NOT DETECTED    INFLUENZA B NOT DETECTED NOT DETECTED   Comprehensive Metabolic Panel   Result Value Ref Range    Sodium 140 132 - 146 mmol/L    Potassium 4.0 3.5 - 5.0 mmol/L    Chloride 100 98 - 107 mmol/L    CO2 24 22 - 29 mmol/L    Anion Gap 16 7 - 16 mmol/L    Glucose 113 (H) 74 - 99 mg/dL    BUN 4 (L) 6 - 20 mg/dL    Creatinine 0.8 0.5 - 1.0 mg/dL    GFR Non-African American >60 >=60 mL/min/1.73    GFR African American >60     Calcium 9.4 8.6 - 10.2 mg/dL    Total Protein 7.9 6.4 - 8.3 g/dL    Albumin 4.7 3.5 - 5.2 g/dL    Total Bilirubin 0.3 0.0 - 1.2 mg/dL    Alkaline Phosphatase 76 35 - 104 U/L    ALT 10 0 - 32 U/L    AST 15 0 - 31 U/L   CBC with Auto Differential   Result Value Ref Range    WBC 4.9 4.5 - 11.5 E9/L    RBC 4.69 3.50 - 5.50 E12/L    Hemoglobin 13.5 11.5 - 15.5 g/dL    Hematocrit 40.0 34.0 - 48.0 %    MCV 85.3 80.0 - 99.9 fL    MCH 28.8 26.0 - 35.0 pg    MCHC 33.8 32.0 - 34.5 %    RDW 11.6 11.5 - 15.0 fL    Platelets 915 417 - 241 E9/L    MPV 8.3 7.0 - 12.0 fL    Neutrophils % 67.3 43.0 - 80.0 %    Immature Granulocytes % 0.2 0.0 - 5.0 %    Lymphocytes % 23.7 20.0 - 42.0 %    Monocytes % 8.6 2.0 - 12.0 %    Eosinophils % 0.0 0.0 - 6.0 %    Basophils % 0.2 0.0 - 2.0 %    Neutrophils Absolute 3.29 1.80 - 7.30 E9/L    Immature Granulocytes # 0.01 E9/L    Lymphocytes Absolute 1.16 (L) 1.50 - 4.00 E9/L    Monocytes Absolute 0.42 0.10 - 0.95 E9/L    Eosinophils Absolute 0.00 (L) 0.05 - 0.50 E9/L    Basophils Absolute 0.01 0.00 - 0.20 E9/L   Serum Drug Screen   Result Value Ref Range    Ethanol Lvl <10 mg/dL    Acetaminophen Level <5.0 (L) 10.0 - 46.2 mcg/mL    Salicylate, Serum <2.7 0.0 - 30.0 mg/dL    TCA Scrn NEGATIVE Cutoff:300 ng/mL   Urine Drug Screen   Result Value Ref Range    Amphetamine Screen, Urine NOT DETECTED Negative <1000 ng/mL    Barbiturate Screen, Ur NOT DETECTED Negative < 200 ng/mL    Benzodiazepine Screen, Urine NOT DETECTED Negative < 200 ng/mL    Cannabinoid Scrn, Ur POSITIVE (A) Negative < 50ng/mL    Cocaine Metabolite Screen, Urine NOT DETECTED Negative < 300 ng/mL    Opiate Scrn, Ur NOT DETECTED Negative < 300ng/mL    PCP Screen, Urine NOT DETECTED Negative < 25 ng/mL    Methadone Screen, Urine NOT DETECTED Negative <300 ng/mL    Oxycodone Urine NOT DETECTED Negative <100 ng/mL    FENTANYL SCREEN, URINE NOT DETECTED Negative <1 ng/mL    Drug Screen Comment: see below    POC Pregnancy Urine Qual   Result Value Ref Range    HCG, Urine, POC Negative Negative    Lot Number [id number]     Positive QC Pass/Fail Pass     Negative QC Pass/Fail Pass    ,       RADIOLOGY:  Interpreted by Radiologist unless otherwise specified  No orders to display         EKG Interpretation  Interpreted by emergency department physician, Dr. Rg Hines    Date of EK22  Time: 1709    Rhythm: normal sinus   Rate: 78  Axis: normal  Conduction: normal  ST Segments: no acute change  T Waves: no acute change    Clinical Impression: No findings suggestive of acute ischemia or injury  Comparison to prior EKG: no previous EKG      ------------------------- NURSING NOTES AND VITALS REVIEWED ---------------------------   The nursing notes within the ED encounter and vital signs as below have been reviewed by myself  /72   Pulse 96   Temp 98 °F (36.7 °C)   Resp 19   LMP 2022   SpO2 98%     Oxygen Saturation Interpretation: Normal    The patients available past medical records and past encounters were reviewed. ------------------------------ ED COURSE/MEDICAL DECISION MAKING----------------------  Medications   ziprasidone (GEODON) injection 20 mg (has no administration in time range)   midazolam PF (VERSED) injection 2 mg (has no administration in time range)   diphenhydrAMINE (BENADRYL) injection 25 mg (has no administration in time range)                 Medical Decision Making:     I, Dr. Hailee Titus, am the primary provider of record    61-year-old female with history of bipolar affective disorder currently on Wellbutrin and Vyvanse presenting for mental health evaluation. We were presented no information by EMS, will attempt to contact patient's psychiatric services for further information.   Patient is calm and cooperative, no suicidal or homicidal ideations. She states that she is taking her medication as prescribed. She is hemodynamically stable and well-appearing. Patient is remaining on cooperative with staff, Geodon, Benadryl and Versed were ordered.  was able to gather more information, patient has been noncompliant with medication for over 6 months, has been having very erratic behavior as well as aggression. Those closest to patient feel that she would benefit from mental health evaluation as her behavior has become more unpredictable and she is continuing noncompliance with her medication. Metabolic panel is within acceptable limits, no leukocytosis or anemia. EKG shows no findings suggestive of acute ischemia or injury. Urine and serum drug screen are positive for cannabinoids, urine pregnancy is negative. Patient is medically clear for mental health evaluation. Re-Evaluations:  ED Course as of 08/01/22 1816   Mon Aug 01, 2022   1435 Per nursing patient is noncompliant with EKG for blood draw. Medication was ordered for sedation, still attempting to gather information regarding reason for visit [PB]      ED Course User Index  [PB] Hailee Titus DO             This patient's ED course included: a personal history and physicial examination and re-evaluation prior to disposition    This patient has remained hemodynamically stable and been closely monitored during their ED course. Counseling: The emergency provider has spoken with the patient and discussed todays results, in addition to providing specific details for the plan of care and counseling regarding the diagnosis and prognosis. Questions are answered at this time and they are agreeable with the plan.       --------------------------------- IMPRESSION AND DISPOSITION ---------------------------------    IMPRESSION  1. Behavior concern in adult    2.  Noncompliance with medication regimen        DISPOSITION  Disposition: Admit to mental health unit - medically cleared for admission  Patient condition is stable        NOTE: This report was transcribed using voice recognition software.  Every effort was made to ensure accuracy; however, inadvertent computerized transcription errors may be present        Gissel Valerio DO  08/01/22 1814

## 2022-08-01 NOTE — ED NOTES
[facility] to [facility], [employer]. Patient placed in transport queue.        Gm Lucero RN  08/01/22 1437

## 2022-08-01 NOTE — ED NOTES
Behavioral Health Crisis Assessment    Chief Complaint:    Mental Status Exam:  manic like behaviors, overly anxious, emotionally unstable, poor insight and judgement, denies SI, no HI and no hallucinations, communicates poorly    Legal Status  [] Voluntary:  [x] Involuntary, Issued by:    Gender  [] Male [x] Female [] Transgender  [] Other    Sexual Orientation    [x] Heterosexual [] Homosexual [] Bisexual [] Other    Brief Clinical Summary:  Pt arrived with EMS. Pt explained that she has been having difficulty sleeping for the past 4 nights. Pt explained that she treats at [facility] psychiatry with Sergio Gotti for PTSD, anxiety and depression. Pt said that she thinks that she may be bi-polar as well. Pt reported that she was given certain meds and was non compliant because of the side effects. Pt makes delusional statement - I asked her if she was suicidal and she declined and said \"I just love rosa, and she then started crying\". She denies HI and no hallucinations. She is verbally aggressive and confrontational.  She is challenging. Behavior controlled. Collateral Information:   I called Kalyani Stewart, pt's fiparish, [phone], who reports that pt has been experiencing \"ups and downs\", mood swings, and said that he feels she is having \"another psychotic break down\". Kalyani Stewart said that pt is \"all over the place, and real bitchy\". Kalyani Stewart said that her behavior started to change a few weeks ago when she said to him \"I figured it all out\" and explained to him that her PTSD and anxiety is stemming from her parents. Since then, \"she has gone down hill\". He said that she worries about him 24/7 and calls him constantly at work to check on him. She is always crying. Kalyani Stewart said that she has been off meds for the past 6 months.       I called [facility] [phone] - spoke with Soledad Momin who advised that pt has not been herself for over one week, displaying erratic behaviors, is combative, has paranoid and delusional thoughts. Has hx of this behavior, has been off meds - she is having a manic episode - difficult to say that she is not a danger to herself and others. Risk Factors:  Mental Health Diagnosis  History of Trauma  Recent conflict with family/friends  Off prescribed Psych meds  Poor communication skills    Protective Factors:  Strong family/friend support  Outpatient provider  Supportive spouse  Spiritual Beliefs  Goals & Hope for the future  Safe and stable housing  Has access to essential needs  Stable employment  Steady income  Active in the community  No access to weapons     Suicidal Ideations:   [] Reports:    [] Past [] Present   [x] Denies    Suicide Attempts:  [] Reports:   [x] Denies    C-SSRS Screening Completed by RN: Current Suicide Risk:  [x] No Risk [] Low [] Moderate [] High    Homicidal Ideations  [] Reports:   [] Past [] Present   [x] Denies     Self Injurious/Self Mutilation Behaviors:   [] Reports:    [] Past [] Present   [x] Denies    Hallucinations/Delusions   [x] Reports:   [] Denies     Substance Use/Alcohol Use/Addiction:   [] Reports:   [x] Denies   [x] SBIRT Screen Complete. 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 [x]  No  []  Yes (Ask the questions listed below)   When? Did you follow through with the thoughts? [] No     [] Yes- When and what happened? 2.  Have you ever threatened anyone? []  No  [x]  Yes (Ask the questions listed below)   When and what happened?     Have you ever threatened someone with a gun, knife or other weapon? [x]  No  []  Yes - When and what happened? 2. Have you ever had an order of protection taken out against you? []  Yes [x]  No  3. Have you ever been arrested due to violence? []  Yes []  No  4. Have you ever been cruel to animals?  []  Yes [x]  No    After consideration of C-SSRS screening results, C-SSRS assessments, and this professional's assessment the patient's overall suicide risk assessed to be:  [x] No Risk  [] Low   [] Moderate   [] High     [x] Discussed current suicide risk, protective and risk factors with RN and ED Physician     Disposition   [] Home:   [] Outpatient Provider:   [] Crisis Unit:   [x] Inpatient Psychiatric Unit:  [] Other:                    BERHANE Hayes  08/01/22 1520

## 2022-08-01 NOTE — ED NOTES
Pt accepted to [facility] by [facility], NP/ Dr. Herndon Earing.      [facility] SW called disposition to [facility] in admitting, [address]    ED nurse Negrita Bustillo was informed pt was accepted     BERHANE Broderick  08/01/22 0369

## 2022-08-01 NOTE — ED NOTES
Patient refusing all blood work, refusing to provide urine sample at this time. This RN to notify Dr. Nato Langston.      Chava Tran RN  08/01/22 5144

## 2022-08-01 NOTE — ED NOTES
Patient adamantly refusing to change from street clothes to gown, scrubs, and safety socks. Refuses to put on ID band. Continues to refuse blood work, EKG, COVID-Flu swab; refuses to provide urine sample.      Earl Vang RN  08/01/22 3820

## 2022-08-01 NOTE — ED NOTES
Pt tested Covid positive, asymptomatic. Advised Dr. Adriana Jamil; will advise Charge TYREE Brown.      Laura Cadena RN  08/01/22 0867

## 2022-08-02 PROBLEM — F31.2 SEVERE MANIC BIPOLAR 1 DISORDER WITH PSYCHOTIC BEHAVIOR (HCC): Status: ACTIVE | Noted: 2022-08-02

## 2022-08-02 PROBLEM — F12.10 MARIJUANA ABUSE: Status: ACTIVE | Noted: 2022-08-02

## 2022-08-02 PROBLEM — F31.11 BIPOLAR 1 DISORDER, MANIC, MILD (HCC): Status: RESOLVED | Noted: 2022-08-01 | Resolved: 2022-08-02

## 2022-08-02 LAB
EKG ATRIAL RATE: 78 BPM
EKG P AXIS: 70 DEGREES
EKG P-R INTERVAL: 148 MS
EKG Q-T INTERVAL: 396 MS
EKG QRS DURATION: 84 MS
EKG QTC CALCULATION (BAZETT): 451 MS
EKG R AXIS: 80 DEGREES
EKG T AXIS: 49 DEGREES
EKG VENTRICULAR RATE: 78 BPM

## 2022-08-02 PROCEDURE — 90792 PSYCH DIAG EVAL W/MED SRVCS: CPT | Performed by: NURSE PRACTITIONER

## 2022-08-02 PROCEDURE — 6370000000 HC RX 637 (ALT 250 FOR IP): Performed by: NURSE PRACTITIONER

## 2022-08-02 PROCEDURE — 1240000000 HC EMOTIONAL WELLNESS R&B

## 2022-08-02 PROCEDURE — 6370000000 HC RX 637 (ALT 250 FOR IP): Performed by: PSYCHIATRY & NEUROLOGY

## 2022-08-02 RX ORDER — ARIPIPRAZOLE 5 MG/1
5 TABLET ORAL DAILY
Status: DISCONTINUED | OUTPATIENT
Start: 2022-08-02 | End: 2022-08-05

## 2022-08-02 RX ORDER — DIVALPROEX SODIUM 250 MG/1
250 TABLET, DELAYED RELEASE ORAL EVERY 12 HOURS SCHEDULED
Status: DISCONTINUED | OUTPATIENT
Start: 2022-08-02 | End: 2022-08-09 | Stop reason: HOSPADM

## 2022-08-02 RX ADMIN — HYDROXYZINE PAMOATE 50 MG: 50 CAPSULE ORAL at 04:44

## 2022-08-02 RX ADMIN — ARIPIPRAZOLE 5 MG: 5 TABLET ORAL at 12:57

## 2022-08-02 RX ADMIN — MELATONIN 3 MG ORAL TABLET 3 MG: 3 TABLET ORAL at 20:42

## 2022-08-02 RX ADMIN — HALOPERIDOL 5 MG: 5 TABLET ORAL at 00:49

## 2022-08-02 RX ADMIN — ALUMINUM HYDROXIDE, MAGNESIUM HYDROXIDE, AND SIMETHICONE 30 ML: 200; 200; 20 SUSPENSION ORAL at 00:50

## 2022-08-02 RX ADMIN — HYDROXYZINE PAMOATE 50 MG: 50 CAPSULE ORAL at 20:42

## 2022-08-02 RX ADMIN — HALOPERIDOL 5 MG: 5 TABLET ORAL at 20:43

## 2022-08-02 RX ADMIN — ACETAMINOPHEN 650 MG: 325 TABLET ORAL at 20:42

## 2022-08-02 ASSESSMENT — LIFESTYLE VARIABLES
HOW OFTEN DO YOU HAVE A DRINK CONTAINING ALCOHOL: MONTHLY OR LESS
HOW MANY STANDARD DRINKS CONTAINING ALCOHOL DO YOU HAVE ON A TYPICAL DAY: 1 OR 2

## 2022-08-02 ASSESSMENT — SLEEP AND FATIGUE QUESTIONNAIRES
DO YOU USE A SLEEP AID: NO
DO YOU HAVE DIFFICULTY SLEEPING: NO
AVERAGE NUMBER OF SLEEP HOURS: 7

## 2022-08-02 ASSESSMENT — PAIN - FUNCTIONAL ASSESSMENT: PAIN_FUNCTIONAL_ASSESSMENT: NONE - DENIES PAIN

## 2022-08-02 ASSESSMENT — PAIN DESCRIPTION - DESCRIPTORS: DESCRIPTORS: ACHING

## 2022-08-02 ASSESSMENT — PAIN DESCRIPTION - LOCATION: LOCATION: HEAD

## 2022-08-02 NOTE — PLAN OF CARE
Problem: Anxiety  Goal: Will report anxiety at manageable levels  Description: INTERVENTIONS:  1. Administer medication as ordered  2. Teach and rehearse alternative coping skills  3. Provide emotional support with 1:1 interaction with staff  8/2/2022 1538 by Adriana Niño RN  Outcome: Progressing  8/2/2022 0825 by Crystal De Luna RN  Outcome: Progressing     Problem: Coping  Goal: Pt/Family able to verbalize concerns and demonstrate effective coping strategies  Description: INTERVENTIONS:  1. Assist patient/family to identify coping skills, available support systems and cultural and spiritual values  2. Provide emotional support, including active listening and acknowledgement of concerns of patient and caregivers  3. Reduce environmental stimuli, as able  4. Instruct patient/family in relaxation techniques, as appropriate  5. Assess for spiritual pain/suffering and initiate Spiritual Care, Psychosocial Clinical Specialist consults as needed  8/2/2022 1538 by Adriana Niño RN  Outcome: Progressing  8/2/2022 0825 by Crystal De Luna RN  Outcome: Progressing     Problem: Decision Making  Goal: Pt/Family able to effectively weigh alternatives and participate in decision making related to treatment and care  Description: INTERVENTIONS:  1. Determine when there are differences between patient's view, family's view, and healthcare provider's view of condition  2. Facilitate patient and family articulation of goals for care  3. Help patient and family identify pros/cons of alternative solutions  4. Provide information as requested by patient/family  5. Respect patient/family right to receive or not to receive information  6. Serve as a liaison between patient and family and health care team  7.  Initiate Consults from Ethics, Palliative Care or initiate [facility] as is appropriate  Outcome: Progressing     Problem: Behavior  Goal: Pt/Family maintain appropriate behavior and adhere to behavioral management agreement, if implemented  Description: INTERVENTIONS:  1. Assess patient/family's coping skills and  non-compliant behavior (including use of illegal substances)  2. Notify security of behavior or suspected illegal substances which indicate the need for search of the family and/or belongings  3. Encourage verbalization of thoughts and concerns in a socially appropriate manner  4. Utilize positive, consistent limit setting strategies supporting safety of patient, staff and others  5. Encourage participation in the decision making process about the behavioral management agreement  6. If a visitor's behavior poses a threat to safety call refer to organization policy. 7. Initiate consult with , Psychosocial CNS, Spiritual Care as appropriate  Outcome: Progressing     Problem: Depression/Self Harm  Goal: Effect of psychiatric condition will be minimized and patient will be protected from self harm  Description: INTERVENTIONS:  1. Assess impact of patient's symptoms on level of functioning, self care needs and offer support as indicated  2. Assess patient/family knowledge of depression, impact on illness and need for teaching  3. Provide emotional support, presence and reassurance  4. Assess for possible suicidal thoughts or ideation. If patient expresses suicidal thoughts or statements do not leave alone, initiate Suicide Precautions, move to a room close to the nursing station and obtain sitter  5. Initiate consults as appropriate with Mental Health Professional, Spiritual Care, Psychosocial CNS, and consider a recommendation to the LIP for a Psychiatric Consultation  8/2/2022 1538 by Jluis Butterfield RN  Outcome: Progressing  8/2/2022 0807 by Yousif Bishop RN  Outcome: Progressing     Problem: Involuntary Admit  Goal: Will cooperate with staff recommendations and doctor's orders and will demonstrate appropriate behavior  Description: INTERVENTIONS:  1.  Treat underlying conditions and offer medication as ordered  2. Educate regarding involuntary admission procedures and rules  3.  Contain excessive/inappropriate behavior per unit and hospital policies  Outcome: Progressing
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
## 2022-08-02 NOTE — PROGRESS NOTES
Patient has been coming out of room and out to day area every few minutes, pacing and becoming agitated. Pt was offered and accepted prn Haldol 5 mg po for agitation. Pt is now in room sitting quietly. No issues noted at this time, will continue to monitor.

## 2022-08-02 NOTE — PROGRESS NOTES
Patient calm, cooperative, pleasant. Patient appears flat, anxious, but will brighten with conversation. Observed out on the unit, social with peers. Patient denies SI/HI/AVH and states, \"I have never wanted to hurt myself or anyone else. \" Patient rates anxiety 10/10 stating, \"Since I can't go outside while I am here. \" Patient denies depression. Compliant with medications and meals. In control of behaviors and no further complaints at this time. Will continue to monitor and provide support.

## 2022-08-02 NOTE — PROGRESS NOTES
[facility]  Initial Interdisciplinary Treatment Plan NOTE    Review Date & Time: 08/02/2022 0900    Patient was in treatment team    Admission Type:   Admission Type: Involuntary    Reason for admission:  Reason for Admission: \"misunderstanding\"      Estimated Length of Stay Update:  1-3  Estimated Discharge Date Update: 08/04/2022    EDUCATION:   Learner Progress Toward Treatment Goals: Reviewed results and recommendations of this team    Method: Small group    Outcome: Verbalized understanding    PATIENT GOALS: None at this time. PLAN/TREATMENT RECOMMENDATIONS UPDATE: Encourage patient to attend and participate in groups. Take medications as prescribed. GOALS UPDATE:   Time frame for Short-Term Goals: Prior to discharge.     Robin Whittington RN

## 2022-08-02 NOTE — PLAN OF CARE
Problem: Coping  Goal: Pt/Family able to verbalize concerns and demonstrate effective coping strategies  Description: INTERVENTIONS:  1. Assist patient/family to identify coping skills, available support systems and cultural and spiritual values  2. Provide emotional support, including active listening and acknowledgement of concerns of patient and caregivers  3. Reduce environmental stimuli, as able  4. Instruct patient/family in relaxation techniques, as appropriate  5. Assess for spiritual pain/suffering and initiate Spiritual Care, Psychosocial Clinical Specialist consults as needed  Outcome: Progressing     Problem: Decision Making  Goal: Pt/Family able to effectively weigh alternatives and participate in decision making related to treatment and care  Description: INTERVENTIONS:  1. Determine when there are differences between patient's view, family's view, and healthcare provider's view of condition  2. Facilitate patient and family articulation of goals for care  3. Help patient and family identify pros/cons of alternative solutions  4. Provide information as requested by patient/family  5. Respect patient/family right to receive or not to receive information  6. Serve as a liaison between patient and family and health care team  7. Initiate Consults from Ethics, Palliative Care or initiate [facility] as is appropriate  Outcome: Progressing     Problem: Behavior  Goal: Pt/Family maintain appropriate behavior and adhere to behavioral management agreement, if implemented  Description: INTERVENTIONS:  1. Assess patient/family's coping skills and  non-compliant behavior (including use of illegal substances)  2. Notify security of behavior or suspected illegal substances which indicate the need for search of the family and/or belongings  3. Encourage verbalization of thoughts and concerns in a socially appropriate manner  4.  Utilize positive, consistent limit setting strategies supporting safety of patient, staff and others  5. Encourage participation in the decision making process about the behavioral management agreement  6. If a visitor's behavior poses a threat to safety call refer to organization policy.   7. Initiate consult with , Psychosocial CNS, Spiritual Care as appropriate  Outcome: Progressing

## 2022-08-02 NOTE — PROGRESS NOTES
Patient rested approximately one hour before coming to Nurses station again, stating that she can not sleep. Pt requested and was given a Vistaril 50 mg po at 0445 and remains in day room pacing at this time. Pt did try to exit doors both sets of doors while pacing halls.

## 2022-08-02 NOTE — PLAN OF CARE
Problem: Anxiety  Goal: Will report anxiety at manageable levels  Description: INTERVENTIONS:  1. Administer medication as ordered  2. Teach and rehearse alternative coping skills  3. Provide emotional support with 1:1 interaction with staff  Outcome: Progressing     Problem: Coping  Goal: Pt/Family able to verbalize concerns and demonstrate effective coping strategies  Description: INTERVENTIONS:  1. Assist patient/family to identify coping skills, available support systems and cultural and spiritual values  2. Provide emotional support, including active listening and acknowledgement of concerns of patient and caregivers  3. Reduce environmental stimuli, as able  4. Instruct patient/family in relaxation techniques, as appropriate  5. Assess for spiritual pain/suffering and initiate Spiritual Care, Psychosocial Clinical Specialist consults as needed  8/2/2022 0825 by Christina Mata RN  Outcome: Progressing     Problem: Depression/Self Harm  Goal: Effect of psychiatric condition will be minimized and patient will be protected from self harm  Description: INTERVENTIONS:  1. Assess impact of patient's symptoms on level of functioning, self care needs and offer support as indicated  2. Assess patient/family knowledge of depression, impact on illness and need for teaching  3. Provide emotional support, presence and reassurance  4. Assess for possible suicidal thoughts or ideation. If patient expresses suicidal thoughts or statements do not leave alone, initiate Suicide Precautions, move to a room close to the nursing station and obtain sitter  5.  Initiate consults as appropriate with Mental Health Professional, Spiritual Care, Psychosocial CNS, and consider a recommendation to the LIP for a Psychiatric Consultation  Outcome: Progressing
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
## 2022-08-02 NOTE — PROGRESS NOTES
Patient resting quiet to self at this time, respirations are even and unlabored, no signs or symptoms of distress or discomfort. PRN medications of Vistaril 50 mg given at 2042 for anxiety, as well as Maalox 30 ml po for complaints of stomach pain, and Haldol 5 mg po for agitation were given to patient. Staff will continue to conduct environmental rounds to ensure the safety of everyone on the unit. Staff will provide support and interventions as requested or required.

## 2022-08-02 NOTE — PROGRESS NOTES
[id number] [facility]  Admission Note     Pt admitted to unit and day room, and was calm and compliant throughout the admission process. Pt denies SI/HI or AVH, and admits to \"12/10\" anxiety and 1/10 depression. Pt denies pain and no s/s of distress were noted. Pt went to bed shortly after being acclimated to unit. Will continue to monitor. Admission Type:   Admission Type: Involuntary    Reason for admission:  Reason for Admission: \"misunderstanding\"      Addictive Behavior:   Addictive Behavior  In the Past 3 Months, Have You Felt or Has Someone Told You That You Have a Problem With  : None    Medical Problems:   Past Medical History:   Diagnosis Date    ADHD (attention deficit hyperactivity disorder)     Bipolar 1 disorder (HCC)     Pneumonia     PTSD (post-traumatic stress disorder)        Status EXAM:  Mental Status and Behavioral Exam  Normal: Yes  Level of Assistance: Independent/Self  Facial Expression: Worried  Affect: Blunt  Level of Consciousness: Alert  Frequency of Checks: 4 times per hour, close  Mood:Normal: Yes  Motor Activity:Normal: No  Motor Activity: Decreased  Eye Contact: Good  Observed Behavior: Cooperative, Guarded  Sexual Misconduct History: Current - no  Preception: [place] to person, [place] to time, [place] to place, [place] to situation  Attention:Normal: No  Attention: Distractible  Thought Processes: Other (comment), Circumstantial  Thought Content:Normal: Yes  Thought Content: Other (comment)  Depression Symptoms: No problems reported or observed. Anxiety Symptoms: Generalized  Elaine Symptoms: No problems reported or observed. Hallucinations: None  Delusions: No  Memory:Normal: Yes  Insight and Judgment: No  Insight and Judgment: Poor judgment, Poor insight    Tobacco Screening:  Practical Counseling, on admission, vianca X, if applicable and completed (first 3 are required if patient doesn't refuse):             ( x) Recognizing danger situations (included triggers and roadblocks)                    ( x) Coping skills (new ways to manage stress,relaxation techniques, changing routine, distraction)                                                           ( x) Basic information about quitting (benefits of quitting, techniques in how to quit, available resources  ( ) Referral for counseling faxed to [facility]                                                                                                                   ( ) Patient refused counseling  ( ) Patient has not smoked in the last 30 days    Metabolic Screening:    Lab Results   Component Value Date    LABA1C 5.1 07/18/2018       Lab Results   Component Value Date    CHOL 126 07/18/2018     Lab Results   Component Value Date    TRIG 58 07/18/2018     Lab Results   Component Value Date    HDL 37 07/18/2018     No components found for: LDLCAL  Lab Results   Component Value Date    LABVLDL 12 07/18/2018         There is no height or weight on file to calculate BMI. BP Readings from Last 2 Encounters:   08/01/22 125/85   04/29/22 112/74           Pt admitted with followings belongings:  Dental Appliances: None  Vision - Corrective Lenses: None  Hearing Aid: None  Jewelry: None  Body Piercings Removed: No  Clothing: Footwear, Socks, Pants, Undergarments  Other Valuables:  At home    Arnav Foster RN

## 2022-08-02 NOTE — GROUP NOTE
Group Therapy Note    Date: 8/2/2022    Group Start Time: 7406  Group End Time: 7050  Group Topic: Cognitive Skills    [facility]    KATY Guillory LSW        Group Therapy Note    Attendees: 4       Patient's Goal: Pt will be able to verbalize understanding of self-care, and it's importance. Notes:  Pt made connections and participated in group. Status After Intervention:  Improved    Participation Level:  Active Listener and Interactive    Participation Quality: Appropriate, Attentive, Sharing, and Supportive      Speech:  normal      Thought Process/Content: Logical  Linear      Affective Functioning: Congruent      Mood: depressed      Level of consciousness:  Alert, Oriented x4, and Attentive      Response to Learning: Able to verbalize current knowledge/experience      Endings: None Reported    Modes of Intervention: Education, Support, Socialization, and Exploration      Discipline Responsible: /Counselor      Signature:  KATY Guillory LSW

## 2022-08-03 PROCEDURE — 6370000000 HC RX 637 (ALT 250 FOR IP): Performed by: PSYCHIATRY & NEUROLOGY

## 2022-08-03 PROCEDURE — 1240000000 HC EMOTIONAL WELLNESS R&B

## 2022-08-03 PROCEDURE — 6370000000 HC RX 637 (ALT 250 FOR IP): Performed by: NURSE PRACTITIONER

## 2022-08-03 PROCEDURE — 99232 SBSQ HOSP IP/OBS MODERATE 35: CPT | Performed by: NURSE PRACTITIONER

## 2022-08-03 RX ADMIN — HYDROXYZINE PAMOATE 50 MG: 50 CAPSULE ORAL at 20:36

## 2022-08-03 RX ADMIN — ARIPIPRAZOLE 5 MG: 5 TABLET ORAL at 08:02

## 2022-08-03 RX ADMIN — DIVALPROEX SODIUM 250 MG: 250 TABLET, DELAYED RELEASE ORAL at 20:36

## 2022-08-03 RX ADMIN — DIVALPROEX SODIUM 250 MG: 250 TABLET, DELAYED RELEASE ORAL at 08:03

## 2022-08-03 RX ADMIN — MELATONIN 3 MG ORAL TABLET 3 MG: 3 TABLET ORAL at 20:36

## 2022-08-03 NOTE — PROGRESS NOTES
BEHAVIORAL HEALTH FOLLOW-UP NOTE     8/3/2022     Patient was seen and examined in person, Chart reviewed   Patient's case discussed with staff/team    Chief Complaint: \"I was thinking about the depakote and what you said about if I wanted to have a baby. \"    Interim History:     \Patient is assessed in her room this morning she is bright, tells me that she slept very well last night. She has concerns regarding the depakote as she was provided education yesterday that depakote should not be taken during pregnancy. This patient is not pregnant however is clearly expressing an interest in pregnancy at some point. She is expressing concern about the depakote. Patient educated that we were using the depakote for a short period of time to rapidly stabilize her and that we can change the medication. The patient refused her Depakote last evening however did take it this morning. She still appears manic, her affect is exaggerated. She tells me that she is beginning to feel better. We again reviewed her medications and she seems receptive to education provided and agreeable to continue with the medications recommended by the treatment team.  Devoid of suicidal or homicidal ideation intent or plan. Minimizing circumstances of hospitalization, demonstrating some poor insight into need for treatment. She has a history of treatment noncompliance which led to this hospitalization. Is also not a reliable historian.   Appetite:  [x] Normal/Unchanged  [] Increased  [] Decreased      Sleep:       [] Normal/Unchanged  [] Fair       [x] Poor              Energy:    [] Normal/Unchanged  [x] Increased  [] Decreased        SI [] Present  [x] Absent    HI  []Present  [x] Absent     Aggression:  [] yes  [x] no    Patient is [x] able  [] unable to CONTRACT FOR SAFETY     PAST MEDICAL/PSYCHIATRIC HISTORY:   Past Medical History:   Diagnosis Date    ADHD (attention deficit hyperactivity disorder)     Bipolar 1 disorder ([facility] [address] Pneumonia     PTSD (post-traumatic stress disorder)        FAMILY/SOCIAL HISTORY:  History reviewed. No pertinent family history. Social History     Socioeconomic History    Marital status: Single     Spouse name: Not on file    Number of children: 0    Years of education: 14    Highest education level: Not on file   Occupational History     Employer: [employer]   Tobacco Use    Smoking status: Former     Packs/day: 0.10     Types: Cigarettes    Smokeless tobacco: Never   Vaping Use    Vaping Use: Every day   Substance and Sexual Activity    Alcohol use: No     Comment: rare    Drug use: Yes     Types: Marijuana (Weed)     Comment: couple times per month    Sexual activity: Yes     Partners: Male   Other Topics Concern    Not on file   Social History Narrative    Not on file     Social Determinants of Health     Financial Resource Strain: Not on file   Food Insecurity: Not on file   Transportation Needs: Not on file   Physical Activity: Not on file   Stress: Not on file   Social Connections: Not on file   Intimate Partner Violence: Not on file   Housing Stability: Not on file           ROS:  [x] All negative/unchanged except if checked.  Explain positive(checked items) below:  [] Constitutional  [] Eyes  [] Ear/Nose/Mouth/Throat  [] Respiratory  [] CV  [] GI  []   [] Musculoskeletal  [] Skin/Breast  [] Neurological  [] Endocrine  [] Heme/Lymph  [] Allergic/Immunologic    Explanation:     MEDICATIONS:    Current Facility-Administered Medications:     divalproex (DEPAKOTE) DR tablet 250 mg, 250 mg, Oral, 2 times per day, MUNDO Oliver CNP, 250 mg at 08/03/22 2696    ARIPiprazole (ABILIFY) tablet 5 mg, 5 mg, Oral, Daily, MUNDO Oliver CNP, 5 mg at 08/03/22 0802    ziprasidone (GEODON) injection 20 mg, 20 mg, IntraMUSCular, Once, Janeece Confer, DO    midazolam PF (VERSED) injection 2 mg, 2 mg, IntraMUSCular, Once, Janeece Confer, DO    diphenhydrAMINE (BENADRYL) injection 25 mg, 25 mg, IntraMUSCular, Once, Virgle Curly, DO    acetaminophen (TYLENOL) tablet 650 mg, 650 mg, Oral, Q6H PRN, Laura Hart MD, 650 mg at 08/02/22 2042    magnesium hydroxide (MILK OF MAGNESIA) 400 MG/5ML suspension 30 mL, 30 mL, Oral, Daily PRN, Laura Hart MD    nicotine (NICODERM CQ) 21 MG/24HR 1 patch, 1 patch, TransDERmal, Daily, Laura Hart MD, 1 patch at 08/03/22 0806    aluminum & magnesium hydroxide-simethicone (MAALOX) 200-200-20 MG/5ML suspension 30 mL, 30 mL, Oral, PRN, Laura Hart MD, 30 mL at 08/02/22 0050    hydrOXYzine pamoate (VISTARIL) capsule 50 mg, 50 mg, Oral, TID PRN, Laura Hart MD, 50 mg at 08/02/22 2042    haloperidol (HALDOL) tablet 5 mg, 5 mg, Oral, Q6H PRN, 5 mg at 08/02/22 2043 **OR** haloperidol lactate (HALDOL) injection 5 mg, 5 mg, IntraMUSCular, Q6H PRN, Laura Hart MD    melatonin tablet 3 mg, 3 mg, Oral, Nightly, Laura Hart MD, 3 mg at 08/02/22 2042      Examination:  /80   Pulse 84   Temp 97.5 °F (36.4 °C) (Temporal)   Resp 16   Ht 5' 6\" (1.676 m)   Wt 155 lb (70.3 kg)   LMP 07/11/2022   SpO2 97%   BMI 25.02 kg/m²   Gait - steady  Medication side effects(SE): None reported    Mental Status Examination:    Level of consciousness:  within normal limits   Appearance:  well-appearing  Behavior/Motor:  no abnormalities noted  Attitude toward examiner:  cooperative  Speech:  normal volume and hyperverbal   Mood: decreased range  Affect:  blunted  Thought processes:  overabundance of ideas, flight of ideas, and loose associations   Thought content: Denies suicidal or homicidal ideation intent or plan. Denies any auditory or visual hallucinations however she seems to have some Congregation preoccupation questioning covert signs of psychosis.   Cognition:  oriented to person, place, and time   Concentration distractible  Memory intact  Insight poor   Judgement poor   Fund of Knowledge adequate       ASSESSMENT:   Patient symptoms are:  [] Well controlled  [] Improving  [] Worsening  [x] No change      Diagnosis:   Principal Problem:    Severe manic bipolar 1 disorder with psychotic behavior ([facility] [address]  Active Problems:    Marijuana abuse  Resolved Problems:    * No resolved hospital problems. *      LABS:    Recent Labs     08/01/22  1643   WBC 4.9   HGB 13.5        Recent Labs     08/01/22  1643      K 4.0      CO2 24   BUN 4*   CREATININE 0.8   GLUCOSE 113*     Recent Labs     08/01/22  1643   BILITOT 0.3   ALKPHOS 76   AST 15   ALT 10     Lab Results   Component Value Date/Time    LABAMPH NOT DETECTED 08/01/2022 04:43 PM    BARBSCNU NOT DETECTED 08/01/2022 04:43 PM    LABBENZ NOT DETECTED 08/01/2022 04:43 PM    LABMETH NOT DETECTED 08/01/2022 04:43 PM    OPIATESCREENURINE NOT DETECTED 08/01/2022 04:43 PM    PHENCYCLIDINESCREENURINE NOT DETECTED 08/01/2022 04:43 PM    ETOH <10 08/01/2022 04:43 PM     Lab Results   Component Value Date/Time    TSH 1.790 07/17/2018 10:15 AM     No results found for: LITHIUM  No results found for: VALPROATE, CBMZ        Treatment Plan:  The patient's diagnosis, treatment plan, medication management were formulated after patient was seen directly by the attending physician and myself and all relevant documentation was reviewed. Risk, benefit, side effects, possible outcomes of the medication and alternatives discussed with the patient and the patient demonstrated understanding. The patient was also educated that the outcome of treatment will depend on the medication compliance as directed by the prescribers along with regular follow-up, compliance with the labs and other work-up, as clinically indicated. The patient was referred to outpatient/inpatient substance abuse rehabilitation programming.   She was educated multiple times during the hospitalization that if she chooses to continue to use drugs or alcohol, she may act out impulsively, resulting in serious harm to self or others even though unintentional.  She was also educated that mental health treatment cannot be optimized with ongoing use of drugs or alcohol she demonstrated understanding and has the capacity to understand that. Patient denies any issues with substance abuse, stating that she has a medical marijuana card     Since patient is refusing to depakote and voicing concerns about the effects of this medication if she were to become pregnant we will consider changing her medication to Trileptal.    Abilify 10 mg daily  Plan for Abilify anabelletendes MERCHANT      Collateral information: Followed by social work  CD evaluation  Encourage patient to attend group and other milieu activities. Discharge planning discussed with the patient and treatment team.    PSYCHOTHERAPY/COUNSELING:  [x] Therapeutic interview  [x] Supportive  [] CBT  [] Ongoing  [] Other    [x] Patient continues to need, on a daily basis, active treatment furnished directly by or requiring the supervision of inpatient psychiatric personnel      Anticipated Length of stay: 5 to 7 days based on stability       NOTE: This report was transcribed using voice recognition software. Every effort was made to ensure accuracy; however, inadvertent computerized transcription errors may be present.     Electronically signed by MUNDO Cook CNP on 8/3/2022 at 10:20 AM

## 2022-08-03 NOTE — PLAN OF CARE
Problem: Anxiety  Goal: Will report anxiety at manageable levels  Description: INTERVENTIONS:  1. Administer medication as ordered  2. Teach and rehearse alternative coping skills  3. Provide emotional support with 1:1 interaction with staff  8/3/2022 1951 by Genaro Hinton RN  Outcome: Progressing     Problem: Depression/Self Harm  Goal: Effect of psychiatric condition will be minimized and patient will be protected from self harm  Description: INTERVENTIONS:  1. Assess impact of patient's symptoms on level of functioning, self care needs and offer support as indicated  2. Assess patient/family knowledge of depression, impact on illness and need for teaching  3. Provide emotional support, presence and reassurance  4. Assess for possible suicidal thoughts or ideation. If patient expresses suicidal thoughts or statements do not leave alone, initiate Suicide Precautions, move to a room close to the nursing station and obtain sitter  5. Initiate consults as appropriate with Mental Health Professional, Spiritual Care, Psychosocial CNS, and consider a recommendation to the LIP for a Psychiatric Consultation  8/3/2022 1950 by Genaro Hinton RN  Outcome: Progressing    Pt is talking on telephone in day room, pleasant and cooperative. Pt denies SI/HI and AVH. Pt rates anxiety a 2/10 and denies depression. Pt states that she is \"feeling better\". Pt denies pain and no s/s of distress are noted at this time. Will continue to monitor.

## 2022-08-03 NOTE — PLAN OF CARE
Patient calm, cooperative, pleasant. Denies SI/HI/AVH and depression. Patient rates anxiety 1/10. Patient observed out on unit periodically, but has been mostly isolative to room, resting. Patient has been in control of behaviors, compliant with prescribed medications and meals. Will continue to monitor and provide support. Problem: Anxiety  Goal: Will report anxiety at manageable levels  Description: INTERVENTIONS:  1. Administer medication as ordered  2. Teach and rehearse alternative coping skills  3. Provide emotional support with 1:1 interaction with staff  Outcome: Progressing     Problem: Coping  Goal: Pt/Family able to verbalize concerns and demonstrate effective coping strategies  Description: INTERVENTIONS:  1. Assist patient/family to identify coping skills, available support systems and cultural and spiritual values  2. Provide emotional support, including active listening and acknowledgement of concerns of patient and caregivers  3. Reduce environmental stimuli, as able  4. Instruct patient/family in relaxation techniques, as appropriate  5. Assess for spiritual pain/suffering and initiate Spiritual Care, Psychosocial Clinical Specialist consults as needed  Outcome: Progressing     Problem: Decision Making  Goal: Pt/Family able to effectively weigh alternatives and participate in decision making related to treatment and care  Description: INTERVENTIONS:  1. Determine when there are differences between patient's view, family's view, and healthcare provider's view of condition  2. Facilitate patient and family articulation of goals for care  3. Help patient and family identify pros/cons of alternative solutions  4. Provide information as requested by patient/family  5. Respect patient/family right to receive or not to receive information  6. Serve as a liaison between patient and family and health care team  7.  Initiate Consults from Ethics, Palliative Care or initiate [facility] as is appropriate  Outcome: Progressing

## 2022-08-03 NOTE — PROGRESS NOTES
Patient resting quiet to self at this time, respirations are even and unlabored, no signs or symptoms of distress or discomfort. PRN Haldol 5 mg, Vistaril 50 mg, and Tylenol 650 mg given po at HS. Pt did refuse Depakote,but took all other medications given thus far this shift. Staff will continue to conduct environmental rounds to ensure the safety of everyone on the unit. Staff will provide support and interventions as requested or required.

## 2022-08-03 NOTE — PROGRESS NOTES
Patient expressing concern about prescribed depakote. Patient states \"I just don't think it's right for me. \" Provided medication education and emotional support to patient.  Patient would like to discuss this further with treatment team.

## 2022-08-03 NOTE — PROGRESS NOTES
Patient attended afternoon education group. Patient was only one in attendance. We discussed about the importance of social support and what it can provide in ones life. .  Patient appeared to get shaken when discussing social support. Patient asked to talk on phone and shower.

## 2022-08-04 PROCEDURE — 1240000000 HC EMOTIONAL WELLNESS R&B

## 2022-08-04 PROCEDURE — 99232 SBSQ HOSP IP/OBS MODERATE 35: CPT | Performed by: NURSE PRACTITIONER

## 2022-08-04 PROCEDURE — 6370000000 HC RX 637 (ALT 250 FOR IP): Performed by: NURSE PRACTITIONER

## 2022-08-04 PROCEDURE — 6370000000 HC RX 637 (ALT 250 FOR IP): Performed by: PSYCHIATRY & NEUROLOGY

## 2022-08-04 RX ORDER — ONDANSETRON 4 MG/1
4 TABLET, ORALLY DISINTEGRATING ORAL EVERY 8 HOURS PRN
Status: DISCONTINUED | OUTPATIENT
Start: 2022-08-04 | End: 2022-08-09 | Stop reason: HOSPADM

## 2022-08-04 RX ADMIN — DIVALPROEX SODIUM 250 MG: 250 TABLET, DELAYED RELEASE ORAL at 20:40

## 2022-08-04 RX ADMIN — ALUMINUM HYDROXIDE, MAGNESIUM HYDROXIDE, AND SIMETHICONE 30 ML: 200; 200; 20 SUSPENSION ORAL at 08:47

## 2022-08-04 RX ADMIN — MELATONIN 3 MG ORAL TABLET 3 MG: 3 TABLET ORAL at 20:39

## 2022-08-04 RX ADMIN — ARIPIPRAZOLE 5 MG: 5 TABLET ORAL at 10:53

## 2022-08-04 RX ADMIN — HYDROXYZINE PAMOATE 50 MG: 50 CAPSULE ORAL at 08:47

## 2022-08-04 RX ADMIN — HYDROXYZINE PAMOATE 50 MG: 50 CAPSULE ORAL at 17:53

## 2022-08-04 RX ADMIN — DIVALPROEX SODIUM 250 MG: 250 TABLET, DELAYED RELEASE ORAL at 10:53

## 2022-08-04 NOTE — CARE COORDINATION
Pt was the only member of group. SW provided this pt with a \"self-care\" handout. SW offered emotional support as needed.

## 2022-08-04 NOTE — PLAN OF CARE
Problem: Anxiety  Goal: Will report anxiety at manageable levels  Description: INTERVENTIONS:  1. Administer medication as ordered  2. Teach and rehearse alternative coping skills  3. Provide emotional support with 1:1 interaction with staff  8/4/2022 0932 by Kaye Weinberg RN  Outcome: Progressing  8/3/2022 1951 by Oziel Collins RN  Outcome: Progressing  8/3/2022 1950 by Oziel Collins RN  Outcome: Progressing     Problem: Decision Making  Goal: Pt/Family able to effectively weigh alternatives and participate in decision making related to treatment and care  Description: INTERVENTIONS:  1. Determine when there are differences between patient's view, family's view, and healthcare provider's view of condition  2. Facilitate patient and family articulation of goals for care  3. Help patient and family identify pros/cons of alternative solutions  4. Provide information as requested by patient/family  5. Respect patient/family right to receive or not to receive information  6. Serve as a liaison between patient and family and health care team  7. Initiate Consults from Ethics, Palliative Care or initiate [facility] as is appropriate  Outcome: Progressing     Problem: Behavior  Goal: Pt/Family maintain appropriate behavior and adhere to behavioral management agreement, if implemented  Description: INTERVENTIONS:  1. Assess patient/family's coping skills and  non-compliant behavior (including use of illegal substances)  2. Notify security of behavior or suspected illegal substances which indicate the need for search of the family and/or belongings  3. Encourage verbalization of thoughts and concerns in a socially appropriate manner  4. Utilize positive, consistent limit setting strategies supporting safety of patient, staff and others  5. Encourage participation in the decision making process about the behavioral management agreement  6.  If a visitor's behavior poses a threat to safety call refer to organization policy. 7. Initiate consult with , Psychosocial CNS, Spiritual Care as appropriate  Outcome: Progressing     Problem: Depression/Self Harm  Goal: Effect of psychiatric condition will be minimized and patient will be protected from self harm  Description: INTERVENTIONS:  1. Assess impact of patient's symptoms on level of functioning, self care needs and offer support as indicated  2. Assess patient/family knowledge of depression, impact on illness and need for teaching  3. Provide emotional support, presence and reassurance  4. Assess for possible suicidal thoughts or ideation. If patient expresses suicidal thoughts or statements do not leave alone, initiate Suicide Precautions, move to a room close to the nursing station and obtain sitter  5. Initiate consults as appropriate with Mental Health Professional, Spiritual Care, Psychosocial CNS, and consider a recommendation to the LIP for a Psychiatric Consultation  8/4/2022 0932 by Yumiko Gallegos RN  Outcome: Progressing  8/3/2022 1951 by Marvin Torres RN  Outcome: Progressing  8/3/2022 1950 by Marvin Torres RN  Outcome: Progressing     Problem: Involuntary Admit  Goal: Will cooperate with staff recommendations and doctor's orders and will demonstrate appropriate behavior  Description: INTERVENTIONS:  1. Treat underlying conditions and offer medication as ordered  2. Educate regarding involuntary admission procedures and rules  3.  Contain excessive/inappropriate behavior per unit and hospital policies  Outcome: Progressing

## 2022-08-04 NOTE — CARE COORDINATION
SW met with this pt for a daily check in. Pt observed sitting in the dayroom watching television. Pt states that she is feeling good, and is looking forward to being discharged in the near future. Pt is currently rating her anxiety and depression a 0/10. Pt is bright and friendly while speaking with this . Pt appears linear and logical. Pt's eye-contact is good. Pt is currently denying SI/ HI/ hallucinations/ delusions. Pt will return home where she resides with her fiance at discharge. Collateral gained from pt's fiance solidifying this discharge plan. Pt's cousin or fiance will provide transportation. Appointments scheduled with [facility] for pt's follow up appointments. SW will continue to monitor this pt's moods and behaviors.

## 2022-08-04 NOTE — PROGRESS NOTES
Patient resting quiet to self at this time, respirations are even and unlabored, no signs or symptoms of distress or discomfort. PRN Vistaril 50 mg given at HS with scheduled medications. Staff will continue to conduct environmental rounds to ensure the safety of everyone on the unit. Staff will provide support and interventions as requested or required.

## 2022-08-04 NOTE — PROGRESS NOTES
Attended afternoon education group. Patient able to complete stress exploration worksheet, and reflect over answers written. Patient able to digest info given and share how managing her daily stressors can help her with the bigger circumstances in life.

## 2022-08-04 NOTE — PROGRESS NOTES
BEHAVIORAL HEALTH FOLLOW-UP NOTE     8/4/2022     Patient was seen and examined in person, Chart reviewed   Patient's case discussed with staff/team    Chief Complaint: \"I am okay with the medications, I just want to be healthy and well. \"   Interim History:   Patient observed in her room this morning, she is asking appropriate questions about her medications when I re approach her about concerns she had voiced to the nurses about the Depakote. She tells me that she is giving the medications a chance and feels that the Depakote is helping her. She tells me that she really didn't understand the medications and appreciates me talking to her more about the medications. She tells me that she is feeling a little better, has been sleepy, but otherwise states she is doing good. Staff report that she is still perseverating on the medications and has been asking the same questions over and over again. Appetite:  [x] Normal/Unchanged  [] Increased  [] Decreased      Sleep:       [] Normal/Unchanged  [] Fair       [x] Poor              Energy:    [] Normal/Unchanged  [x] Increased  [] Decreased        SI [] Present  [x] Absent    HI  []Present  [x] Absent     Aggression:  [] yes  [x] no    Patient is [x] able  [] unable to CONTRACT FOR SAFETY     PAST MEDICAL/PSYCHIATRIC HISTORY:   Past Medical History:   Diagnosis Date    ADHD (attention deficit hyperactivity disorder)     Bipolar 1 disorder (HCC)     Pneumonia     PTSD (post-traumatic stress disorder)        FAMILY/SOCIAL HISTORY:  History reviewed. No pertinent family history.   Social History     Socioeconomic History    Marital status: Single     Spouse name: Not on file    Number of children: 0    Years of education: 14    Highest education level: Not on file   Occupational History     Employer:    Tobacco Use    Smoking status: Former     Packs/day: 0.10     Types: Cigarettes    Smokeless tobacco: Never   Vaping Use    Vaping Use: Every day   Substance and Sexual Activity    Alcohol use: No     Comment: rare    Drug use: Yes     Types: Marijuana (Weed)     Comment: couple times per month    Sexual activity: Yes     Partners: Male   Other Topics Concern    Not on file   Social History Narrative    Not on file     Social Determinants of Health     Financial Resource Strain: Not on file   Food Insecurity: Not on file   Transportation Needs: Not on file   Physical Activity: Not on file   Stress: Not on file   Social Connections: Not on file   Intimate Partner Violence: Not on file   Housing Stability: Not on file           ROS:  [x] All negative/unchanged except if checked.  Explain positive(checked items) below:  [] Constitutional  [] Eyes  [] Ear/Nose/Mouth/Throat  [] Respiratory  [] CV  [] GI  []   [] Musculoskeletal  [] Skin/Breast  [] Neurological  [] Endocrine  [] Heme/Lymph  [] Allergic/Immunologic    Explanation:     MEDICATIONS:    Current Facility-Administered Medications:     ondansetron (ZOFRAN-ODT) disintegrating tablet 4 mg, 4 mg, Oral, Q8H PRN, Rosalene Lieu, APRN - CNP    divalproex (DEPAKOTE) DR tablet 250 mg, 250 mg, Oral, 2 times per day, Rosalene Lieu, APRN - CNP, 250 mg at 08/04/22 1053    ARIPiprazole (ABILIFY) tablet 5 mg, 5 mg, Oral, Daily, Rosalene Lieu, APRN - CNP, 5 mg at 08/04/22 1053    ziprasidone (GEODON) injection 20 mg, 20 mg, IntraMUSCular, Once, Apurva Hem, DO    midazolam PF (VERSED) injection 2 mg, 2 mg, IntraMUSCular, Once, Apurva Hem, DO    diphenhydrAMINE (BENADRYL) injection 25 mg, 25 mg, IntraMUSCular, Once, Apurva Hem, DO    acetaminophen (TYLENOL) tablet 650 mg, 650 mg, Oral, Q6H PRN, Edu Wu MD, 650 mg at 08/02/22 2042    magnesium hydroxide (MILK OF MAGNESIA) 400 MG/5ML suspension 30 mL, 30 mL, Oral, Daily PRN, Edu Wu MD    nicotine (NICODERM CQ) 21 MG/24HR 1 patch, 1 patch, TransDERmal, Daily, Edu Wu MD, 1 patch at 08/04/22 1053    aluminum & magnesium hydroxide-simethicone (MAALOX) 200-200-20 MG/5ML suspension 30 mL, 30 mL, Oral, PRN, Darren Engel MD, 30 mL at 08/04/22 0847    hydrOXYzine pamoate (VISTARIL) capsule 50 mg, 50 mg, Oral, TID PRN, Darren Engel MD, 50 mg at 08/04/22 0847    haloperidol (HALDOL) tablet 5 mg, 5 mg, Oral, Q6H PRN, 5 mg at 08/02/22 2043 **OR** haloperidol lactate (HALDOL) injection 5 mg, 5 mg, IntraMUSCular, Q6H PRN, Darren Engel MD    melatonin tablet 3 mg, 3 mg, Oral, Nightly, Darren Engel MD, 3 mg at 08/03/22 2036      Examination:  /79   Pulse (!) 105   Temp 97.7 °F (36.5 °C) (Temporal)   Resp 15   Ht 5' 6\" (1.676 m)   Wt 155 lb (70.3 kg)   LMP 07/11/2022   SpO2 98%   BMI 25.02 kg/m²   Gait - steady  Medication side effects(SE): None reported    Mental Status Examination:    Level of consciousness:  within normal limits   Appearance:  well-appearing  Behavior/Motor:  no abnormalities noted  Attitude toward examiner:  cooperative  Speech:  normal volume and hyperverbal   Mood: decreased range  Affect:  blunted  Thought processes:  overabundance of ideas, flight of ideas, and loose associations   Thought content: Denies suicidal or homicidal ideation intent or plan. Denies any auditory or visual hallucinations however she seems to have some Christianity preoccupation questioning covert signs of psychosis. Cognition:  oriented to person, place, and time   Concentration distractible  Memory intact  Insight poor   Judgement poor   Fund of Knowledge adequate       ASSESSMENT:   Patient symptoms are:  [] Well controlled  [] Improving  [] Worsening  [x] No change      Diagnosis:   Principal Problem:    Severe manic bipolar 1 disorder with psychotic behavior ([facility] [address]  Active Problems:    Marijuana abuse  Resolved Problems:    * No resolved hospital problems.  *      LABS:    Recent Labs     08/01/22  1643   WBC 4.9   HGB 13.5        Recent Labs     08/01/22  1643      K 4.0      CO2 24   BUN 4*   CREATININE 0.8 GLUCOSE 113*     Recent Labs     08/01/22  1643   BILITOT 0.3   ALKPHOS 76   AST 15   ALT 10     Lab Results   Component Value Date/Time    LABAMPH NOT DETECTED 08/01/2022 04:43 PM    BARBSCNU NOT DETECTED 08/01/2022 04:43 PM    LABBENZ NOT DETECTED 08/01/2022 04:43 PM    LABMETH NOT DETECTED 08/01/2022 04:43 PM    OPIATESCREENURINE NOT DETECTED 08/01/2022 04:43 PM    PHENCYCLIDINESCREENURINE NOT DETECTED 08/01/2022 04:43 PM    ETOH <10 08/01/2022 04:43 PM     Lab Results   Component Value Date/Time    TSH 1.790 07/17/2018 10:15 AM     No results found for: LITHIUM  No results found for: VALPROATE, CBMZ        Treatment Plan:  The patient's diagnosis, treatment plan, medication management were formulated after patient was seen directly by the attending physician and myself and all relevant documentation was reviewed. Risk, benefit, side effects, possible outcomes of the medication and alternatives discussed with the patient and the patient demonstrated understanding. The patient was also educated that the outcome of treatment will depend on the medication compliance as directed by the prescribers along with regular follow-up, compliance with the labs and other work-up, as clinically indicated. The patient was referred to outpatient/inpatient substance abuse rehabilitation programming. She was educated multiple times during the hospitalization that if she chooses to continue to use drugs or alcohol, she may act out impulsively, resulting in serious harm to self or others even though unintentional.  She was also educated that mental health treatment cannot be optimized with ongoing use of drugs or alcohol she demonstrated understanding and has the capacity to understand that.   Patient denies any issues with substance abuse, stating that she has a medical marijuana card     Since patient is refusing to depakote and voicing concerns about the effects of this medication if she were to become pregnant we will

## 2022-08-04 NOTE — PROGRESS NOTES
Patient denies SI/HI/Hallucinations. Patient complains of feeling anxious and \"needing fresh air. \" Patient appears blunted and sad, but brightens appropriately during conversation. Patient fixated on her medications, up at the desk multiple times asking various staff members about her medications. Patient provided education on prescribed medications by this RN and other staff members multiple times. Patient observed out on the unit watching television. Patient complaining of body aches and nausea secondary to her COVID-19 infection. Patient taking prescribed medications, eating provided meals, and attending groups without issue.

## 2022-08-04 NOTE — PROGRESS NOTES
[facility]  Day 3 Interdisciplinary Treatment Plan NOTE    Review Date & Time: 08/04/2022  0900    Patient was in treatment team    Estimated Length of Stay Update:  3-5  Estimated Discharge Date Update: 08/08/2022    EDUCATION:   Learner Progress Toward Treatment Goals: Reviewed results and recommendations of this team    Method: Small group    Outcome: Verbalized understanding    PATIENT GOALS: None at this time. PLAN/TREATMENT RECOMMENDATIONS UPDATE: Encourage patient to attend and participate in groups. Take medication as prescribed. GOALS UPDATE:   Time frame for Short-Term Goals: Prior to discharge.       Jovanni Licona RN

## 2022-08-04 NOTE — PLAN OF CARE
Pt alert and oriented, denies SI, HI, and hallucinations. Pt rates anxiety 1/10, denies depressions. Pt out on the unit, social with peers, watching TV program. Pt c/o fatigue. Complaint with meals and medications. No issue to report at this time. Staff will continue to monitor pt and offer support as needed. Problem: Anxiety  Goal: Will report anxiety at manageable levels  Description: INTERVENTIONS:  1. Administer medication as ordered  2. Teach and rehearse alternative coping skills  3. Provide emotional support with 1:1 interaction with staff  8/4/2022 1728 by Vickey Loo RN  Outcome: Progressing  8/4/2022 0932 by Bairon Sandra RN  Outcome: Progressing     Problem: Coping  Goal: Pt/Family able to verbalize concerns and demonstrate effective coping strategies  Description: INTERVENTIONS:  1. Assist patient/family to identify coping skills, available support systems and cultural and spiritual values  2. Provide emotional support, including active listening and acknowledgement of concerns of patient and caregivers  3. Reduce environmental stimuli, as able  4. Instruct patient/family in relaxation techniques, as appropriate  5. Assess for spiritual pain/suffering and initiate Spiritual Care, Psychosocial Clinical Specialist consults as needed  Outcome: Progressing     Problem: Decision Making  Goal: Pt/Family able to effectively weigh alternatives and participate in decision making related to treatment and care  Description: INTERVENTIONS:  1. Determine when there are differences between patient's view, family's view, and healthcare provider's view of condition  2. Facilitate patient and family articulation of goals for care  3. Help patient and family identify pros/cons of alternative solutions  4. Provide information as requested by patient/family  5. Respect patient/family right to receive or not to receive information  6. Serve as a liaison between patient and family and health care team  7.  Initiate Consults from Ethics, Palliative Care or initiate [facility] as is appropriate  8/4/2022 1728 by Kavon Cruz RN  Outcome: Progressing  8/4/2022 0932 by Gayle Fraga RN  Outcome: Progressing     Problem: Behavior  Goal: Pt/Family maintain appropriate behavior and adhere to behavioral management agreement, if implemented  Description: INTERVENTIONS:  1. Assess patient/family's coping skills and  non-compliant behavior (including use of illegal substances)  2. Notify security of behavior or suspected illegal substances which indicate the need for search of the family and/or belongings  3. Encourage verbalization of thoughts and concerns in a socially appropriate manner  4. Utilize positive, consistent limit setting strategies supporting safety of patient, staff and others  5. Encourage participation in the decision making process about the behavioral management agreement  6. If a visitor's behavior poses a threat to safety call refer to organization policy. 7. Initiate consult with , Psychosocial CNS, Spiritual Care as appropriate  8/4/2022 1728 by Kavon Cruz RN  Outcome: Progressing  8/4/2022 0932 by Gayle Fraga RN  Outcome: Progressing     Problem: Depression/Self Harm  Goal: Effect of psychiatric condition will be minimized and patient will be protected from self harm  Description: INTERVENTIONS:  1. Assess impact of patient's symptoms on level of functioning, self care needs and offer support as indicated  2. Assess patient/family knowledge of depression, impact on illness and need for teaching  3. Provide emotional support, presence and reassurance  4. Assess for possible suicidal thoughts or ideation. If patient expresses suicidal thoughts or statements do not leave alone, initiate Suicide Precautions, move to a room close to the nursing station and obtain sitter  5.  Initiate consults as appropriate with Mental Health Professional, Spiritual Care, Psychosocial CNS, and consider a recommendation to the LIP for a Psychiatric Consultation  8/4/2022 1728 by Kaye Adams RN  Outcome: Progressing  8/4/2022 0932 by Chris Coats RN  Outcome: Progressing     Problem: Involuntary Admit  Goal: Will cooperate with staff recommendations and doctor's orders and will demonstrate appropriate behavior  Description: INTERVENTIONS:  1. Treat underlying conditions and offer medication as ordered  2. Educate regarding involuntary admission procedures and rules  3.  Contain excessive/inappropriate behavior per unit and hospital policies  7/4/3125 [id number] by Kaye Adams RN  Outcome: Progressing  8/4/2022 0932 by Chris Coats RN  Outcome: Progressing

## 2022-08-05 LAB — VALPROIC ACID LEVEL: 81 MCG/ML (ref 50–100)

## 2022-08-05 PROCEDURE — 99232 SBSQ HOSP IP/OBS MODERATE 35: CPT | Performed by: NURSE PRACTITIONER

## 2022-08-05 PROCEDURE — 36415 COLL VENOUS BLD VENIPUNCTURE: CPT

## 2022-08-05 PROCEDURE — 80164 ASSAY DIPROPYLACETIC ACD TOT: CPT

## 2022-08-05 PROCEDURE — 6370000000 HC RX 637 (ALT 250 FOR IP): Performed by: PSYCHIATRY & NEUROLOGY

## 2022-08-05 PROCEDURE — 1240000000 HC EMOTIONAL WELLNESS R&B

## 2022-08-05 PROCEDURE — 6370000000 HC RX 637 (ALT 250 FOR IP): Performed by: NURSE PRACTITIONER

## 2022-08-05 RX ORDER — ARIPIPRAZOLE 10 MG/1
10 TABLET ORAL DAILY
Status: DISCONTINUED | OUTPATIENT
Start: 2022-08-06 | End: 2022-08-07

## 2022-08-05 RX ORDER — DOCUSATE SODIUM 100 MG/1
100 CAPSULE, LIQUID FILLED ORAL 2 TIMES DAILY
Status: DISCONTINUED | OUTPATIENT
Start: 2022-08-05 | End: 2022-08-09 | Stop reason: HOSPADM

## 2022-08-05 RX ADMIN — ARIPIPRAZOLE 5 MG: 5 TABLET ORAL at 08:26

## 2022-08-05 RX ADMIN — HYDROXYZINE PAMOATE 50 MG: 50 CAPSULE ORAL at 14:14

## 2022-08-05 RX ADMIN — DIVALPROEX SODIUM 250 MG: 250 TABLET, DELAYED RELEASE ORAL at 21:01

## 2022-08-05 RX ADMIN — HYDROXYZINE PAMOATE 50 MG: 50 CAPSULE ORAL at 05:02

## 2022-08-05 RX ADMIN — DIVALPROEX SODIUM 250 MG: 250 TABLET, DELAYED RELEASE ORAL at 08:26

## 2022-08-05 RX ADMIN — DOCUSATE SODIUM 100 MG: 100 CAPSULE, LIQUID FILLED ORAL at 10:21

## 2022-08-05 RX ADMIN — MELATONIN 3 MG ORAL TABLET 3 MG: 3 TABLET ORAL at 21:01

## 2022-08-05 ASSESSMENT — PAIN SCALES - GENERAL
PAINLEVEL_OUTOF10: 0

## 2022-08-05 NOTE — PROGRESS NOTES
Patient asking to speak with this RN privately in her room. Patient upset that one of her peers is pacing the unit, which she reports makes her \"feel uneasy. \" Patient also states that this peer reminds her of an ex  boyfriend. Patient very suspicious of this peer despite him not interacting or engaging with her. Patient educated on the unit's safety policies and procedures and educated that patient safety is our top priority. Patient offered PRN medication for anxiety, which she declined. Shortly after this interaction, this RN received a call from the patient's cousin, Shan Tejada. Shan Tejada was concerned because the patient called her making accusations against one of her peers. The patient reportedly told her cousin that one of her peers is following her around the unit, watching her, and making her feel uncomfortable. Patient's cousin was unsure if these accusations were true or not. This RN explained to Shan Tejada that this issue was already addressed with the patient. Patient also told her cousin that she requested anxiety medication but staff told her it was not due yet. Shan Tejada informed this RN that the patient has been known to exhibit splitting behaviors when she does not get her way. Will continue to provide emotional support to patient as needed.

## 2022-08-05 NOTE — GROUP NOTE
Group Therapy Note    Date: 8/5/2022    Group Start Time: 1110  Group End Time: 1140  Group Topic: Cognitive Skills    [facility]    Lennox Flakes, MSW, LSW        Group Therapy Note    Attendees: 3       Patient's Goal:  Pt will be able to verbalize understanding of the importance of apologizing. Notes:  Pt made connections and participated in group. Status After Intervention:  Improved    Participation Level:  Active Listener and Interactive    Participation Quality: Appropriate, Attentive, Sharing, and Supportive      Speech:  normal      Thought Process/Content: Logical  Linear      Affective Functioning: Congruent      Mood: depressed      Level of consciousness:  Alert, Oriented x4, and Attentive      Response to Learning: Able to verbalize current knowledge/experience      Endings: None Reported    Modes of Intervention: Education, Support, Socialization, and Exploration      Discipline Responsible: /Counselor      Signature:  Lennox Flakes, MSW, LSW

## 2022-08-05 NOTE — CARE COORDINATION
SW met with this pt for a daily check in. Pt observed sitting out in the day room, watching television. Pt states that she is feeling really good today. Pt voiced no complaints to this . Pt is currently rating her anxiety and depression a 0/10. Pt appeared to be bright and pleasant during this 's check in. Pt's eye-contact was good. Pt appears linear and logical. Pt is currently denying SI/ HI/ hallucinations/ delusions. Pt's discharge plan is to return home where she resides with her fiance. Collateral gained from pt's fiance solidifying this discharge plan. Pt's cousin or fiance will provide transportation. Appointments scheduled with [facility] for follow up. SW will continue to monitor this pt's moods and behaviors.

## 2022-08-05 NOTE — PROGRESS NOTES
Unable to have group due to lack of participation. Patient given scrabble, cards and coloring pictures as she states she is bored.

## 2022-08-05 NOTE — PROGRESS NOTES
BEHAVIORAL HEALTH FOLLOW-UP NOTE     8/5/2022     Patient was seen and examined in person, Chart reviewed   Patient's case discussed with staff/team    Chief Complaint: \"I am okay with the medications, I am starting to feel better\"   Interim History:   Patient observed in up in the day room this morning, she is interacting with staff. States that she feels like she is getting better. She is bright, social, less manic, more linear. Responding well to treatment. Still minimizing circumstances of admission, however is fully participating in her treatment. Denies SI/HI intent or plan. Continues to have some hypomania, will increase abilify to 10 mg and plan for MERCHANT. Appetite:  [x] Normal/Unchanged  [] Increased  [] Decreased      Sleep:       [] Normal/Unchanged  [] Fair       [x] Poor              Energy:    [] Normal/Unchanged  [x] Increased  [] Decreased        SI [] Present  [x] Absent    HI  []Present  [x] Absent     Aggression:  [] yes  [x] no    Patient is [x] able  [] unable to CONTRACT FOR SAFETY     PAST MEDICAL/PSYCHIATRIC HISTORY:   Past Medical History:   Diagnosis Date    ADHD (attention deficit hyperactivity disorder)     Bipolar 1 disorder (HCC)     Pneumonia     PTSD (post-traumatic stress disorder)        FAMILY/SOCIAL HISTORY:  History reviewed. No pertinent family history.   Social History     Socioeconomic History    Marital status: Single     Spouse name: Not on file    Number of children: 0    Years of education: 14    Highest education level: Not on file   Occupational History     Employer:    Tobacco Use    Smoking status: Former     Packs/day: 0.10     Types: Cigarettes    Smokeless tobacco: Never   Vaping Use    Vaping Use: Every day   Substance and Sexual Activity    Alcohol use: No     Comment: rare    Drug use: Yes     Types: Marijuana (Weed)     Comment: couple times per month    Sexual activity: Yes     Partners: Male   Other Topics Concern    Not on file   Social History Narrative    Not on file     Social Determinants of Health     Financial Resource Strain: Not on file   Food Insecurity: Not on file   Transportation Needs: Not on file   Physical Activity: Not on file   Stress: Not on file   Social Connections: Not on file   Intimate Partner Violence: Not on file   Housing Stability: Not on file           ROS:  [x] All negative/unchanged except if checked.  Explain positive(checked items) below:  [] Constitutional  [] Eyes  [] Ear/Nose/Mouth/Throat  [] Respiratory  [] CV  [] GI  []   [] Musculoskeletal  [] Skin/Breast  [] Neurological  [] Endocrine  [] Heme/Lymph  [] Allergic/Immunologic    Explanation:     MEDICATIONS:    Current Facility-Administered Medications:     ondansetron (ZOFRAN-ODT) disintegrating tablet 4 mg, 4 mg, Oral, Q8H PRN, MUNDO Duncan CNP    divalproex (DEPAKOTE) DR tablet 250 mg, 250 mg, Oral, 2 times per day, MUNDO Duncan CNP, 250 mg at 08/05/22 3492    ARIPiprazole (ABILIFY) tablet 5 mg, 5 mg, Oral, Daily, MUNDO Duncan CNP, 5 mg at 08/05/22 7103    ziprasidone (GEODON) injection 20 mg, 20 mg, IntraMUSCular, Once, Kristeen Spearing, DO    midazolam PF (VERSED) injection 2 mg, 2 mg, IntraMUSCular, Once, Kristeen Spearing, DO    diphenhydrAMINE (BENADRYL) injection 25 mg, 25 mg, IntraMUSCular, Once, Kristeen Spearing, DO    acetaminophen (TYLENOL) tablet 650 mg, 650 mg, Oral, Q6H PRN, Bella Deal MD, 650 mg at 08/02/22 2042    magnesium hydroxide (MILK OF MAGNESIA) 400 MG/5ML suspension 30 mL, 30 mL, Oral, Daily PRN, Bella Deal MD    nicotine (NICODERM CQ) 21 MG/24HR 1 patch, 1 patch, TransDERmal, Daily, Bella Deal MD, 1 patch at 08/04/22 1053    aluminum & magnesium hydroxide-simethicone (MAALOX) 200-200-20 MG/5ML suspension 30 mL, 30 mL, Oral, PRN, Bella Deal MD, 30 mL at 08/04/22 0847    hydrOXYzine pamoate (VISTARIL) capsule 50 mg, 50 mg, Oral, TID PRN, Bella Deal MD, 50 mg at 08/05/22 0509 haloperidol (HALDOL) tablet 5 mg, 5 mg, Oral, Q6H PRN, 5 mg at 08/02/22 2043 **OR** haloperidol lactate (HALDOL) injection 5 mg, 5 mg, IntraMUSCular, Q6H PRN, Cole Torres MD    melatonin tablet 3 mg, 3 mg, Oral, Nightly, Cole Torres MD, 3 mg at 08/04/22 2039      Examination:  BP (!) 131/93   Pulse (!) 106   Temp 97.9 °F (36.6 °C) (Temporal)   Resp 17   Ht 5' 6\" (1.676 m)   Wt 155 lb (70.3 kg)   LMP 07/11/2022   SpO2 98%   BMI 25.02 kg/m²   Gait - steady  Medication side effects(SE): None reported    Mental Status Examination:    Level of consciousness:  within normal limits   Appearance:  well-appearing  Behavior/Motor:  no abnormalities noted  Attitude toward examiner:  cooperative  Speech:  normal volume and hyperverbal   Mood: decreased range  Affect:  blunted  Thought processes:  overabundance of ideas, flight of ideas, and loose associations   Thought content: Denies suicidal or homicidal ideation intent or plan. Denies any auditory or visual hallucinations however she seems to have some Hindu preoccupation questioning covert signs of psychosis. Cognition:  oriented to person, place, and time   Concentration distractible  Memory intact  Insight poor   Judgement poor   Fund of Knowledge adequate       ASSESSMENT:   Patient symptoms are:  [] Well controlled  [] Improving  [] Worsening  [x] No change      Diagnosis:   Principal Problem:    Severe manic bipolar 1 disorder with psychotic behavior ([facility] [address]  Active Problems:    Marijuana abuse  Resolved Problems:    * No resolved hospital problems. *      LABS:    No results for input(s): WBC, HGB, PLT in the last 72 hours. No results for input(s): NA, K, CL, CO2, BUN, CREATININE, GLUCOSE in the last 72 hours. No results for input(s): BILITOT, ALKPHOS, AST, ALT in the last 72 hours.     Lab Results   Component Value Date/Time    LABAMPH NOT DETECTED 08/01/2022 04:43 PM    BARBSCNU NOT DETECTED 08/01/2022 04:43 PM    LABBENZ NOT DETECTED 08/01/2022 04:43 PM    LABMETH NOT DETECTED 08/01/2022 04:43 PM    OPIATESCREENURINE NOT DETECTED 08/01/2022 04:43 PM    PHENCYCLIDINESCREENURINE NOT DETECTED 08/01/2022 04:43 PM    ETOH <10 08/01/2022 04:43 PM     Lab Results   Component Value Date/Time    TSH 1.790 07/17/2018 10:15 AM     No results found for: LITHIUM  No results found for: VALPROATE, CBMZ        Treatment Plan:  The patient's diagnosis, treatment plan, medication management were formulated after patient was seen directly by the attending physician and myself and all relevant documentation was reviewed. Risk, benefit, side effects, possible outcomes of the medication and alternatives discussed with the patient and the patient demonstrated understanding. The patient was also educated that the outcome of treatment will depend on the medication compliance as directed by the prescribers along with regular follow-up, compliance with the labs and other work-up, as clinically indicated. The patient was referred to outpatient/inpatient substance abuse rehabilitation programming. She was educated multiple times during the hospitalization that if she chooses to continue to use drugs or alcohol, she may act out impulsively, resulting in serious harm to self or others even though unintentional.  She was also educated that mental health treatment cannot be optimized with ongoing use of drugs or alcohol she demonstrated understanding and has the capacity to understand that. Patient denies any issues with substance abuse, stating that she has a medical marijuana card     Since patient is refusing to depakote and voicing concerns about the effects of this medication if she were to become pregnant we will consider changing her medication to Trileptal.    Abilify 10 mg daily  Plan for Abilify maintenna MERCHANT      Collateral information: Followed by social work  CD evaluation  Encourage patient to attend group and other milieu activities.   Discharge planning discussed with the patient and treatment team.    PSYCHOTHERAPY/COUNSELING:  [x] Therapeutic interview  [x] Supportive  [] CBT  [] Ongoing  [] Other    [x] Patient continues to need, on a daily basis, active treatment furnished directly by or requiring the supervision of inpatient psychiatric personnel      Anticipated Length of stay: 5 to 7 days based on stability       NOTE: This report was transcribed using voice recognition software. Every effort was made to ensure accuracy; however, inadvertent computerized transcription errors may be present.     Electronically signed by Charmayne Sheer, APRN - CNP on 8/5/2022 at 8:32 AM

## 2022-08-05 NOTE — PLAN OF CARE
Problem: Anxiety  Goal: Will report anxiety at manageable levels  Description: INTERVENTIONS:  1. Administer medication as ordered  2. Teach and rehearse alternative coping skills  3. Provide emotional support with 1:1 interaction with staff  8/4/2022 2013 by Yoko Cantor RN  Outcome: Progressing     Problem: Behavior  Goal: Pt/Family maintain appropriate behavior and adhere to behavioral management agreement, if implemented  Description: INTERVENTIONS:  1. Assess patient/family's coping skills and  non-compliant behavior (including use of illegal substances)  2. Notify security of behavior or suspected illegal substances which indicate the need for search of the family and/or belongings  3. Encourage verbalization of thoughts and concerns in a socially appropriate manner  4. Utilize positive, consistent limit setting strategies supporting safety of patient, staff and others  5. Encourage participation in the decision making process about the behavioral management agreement  6. If a visitor's behavior poses a threat to safety call refer to organization policy. 7. Initiate consult with , Psychosocial CNS, Spiritual Care as appropriate  8/4/2022 2013 by Yoko Cantor RN  Outcome: Progressing     Patient has been out on the unit watching tv and using the phone. Pleasant and cooperative during conversation. Mood is brightened. Reports that she is feeling much better and sleeping through the night. Denies suicidal/homicidal ideations and hallucinations at this time. No physical complaints. Purposeful rounding continued.

## 2022-08-05 NOTE — PLAN OF CARE
skills and  non-compliant behavior (including use of illegal substances)  2. Notify security of behavior or suspected illegal substances which indicate the need for search of the family and/or belongings  3. Encourage verbalization of thoughts and concerns in a socially appropriate manner  4. Utilize positive, consistent limit setting strategies supporting safety of patient, staff and others  5. Encourage participation in the decision making process about the behavioral management agreement  6. If a visitor's behavior poses a threat to safety call refer to organization policy. 7. Initiate consult with , Psychosocial CNS, Spiritual Care as appropriate  8/5/2022 0206 by Ortiz Pacheco, RN  Outcome: Progressing  8/4/2022 2013 by Montrell Benton RN  Outcome: Progressing     Problem: Involuntary Admit  Goal: Will cooperate with staff recommendations and doctor's orders and will demonstrate appropriate behavior  Description: INTERVENTIONS:  1. Treat underlying conditions and offer medication as ordered  2. Educate regarding involuntary admission procedures and rules  3.  Contain excessive/inappropriate behavior per unit and hospital policies  Outcome: Progressing

## 2022-08-05 NOTE — PROGRESS NOTES
Patient denies SI/HI/Hallucinations, anxiety or depression. Patient less intrusive today, not frequently up at the desk with multiple questions/requests. Patient brighter today, observed out on the unit watching television and socializing with peers. Patient pleasant and cooperative. Patient optimistic about her future, talking about possibly going to nursing school. Patient taking prescribed medications, eating provided meals, and attending groups without issue.

## 2022-08-06 PROCEDURE — 6370000000 HC RX 637 (ALT 250 FOR IP): Performed by: PSYCHIATRY & NEUROLOGY

## 2022-08-06 PROCEDURE — 1240000000 HC EMOTIONAL WELLNESS R&B

## 2022-08-06 PROCEDURE — 6370000000 HC RX 637 (ALT 250 FOR IP): Performed by: NURSE PRACTITIONER

## 2022-08-06 PROCEDURE — 99231 SBSQ HOSP IP/OBS SF/LOW 25: CPT | Performed by: NURSE PRACTITIONER

## 2022-08-06 RX ORDER — BENZTROPINE MESYLATE 1 MG/1
1 TABLET ORAL 2 TIMES DAILY
Status: DISCONTINUED | OUTPATIENT
Start: 2022-08-06 | End: 2022-08-09 | Stop reason: HOSPADM

## 2022-08-06 RX ADMIN — HYDROXYZINE PAMOATE 50 MG: 50 CAPSULE ORAL at 03:59

## 2022-08-06 RX ADMIN — HYDROXYZINE PAMOATE 50 MG: 50 CAPSULE ORAL at 21:01

## 2022-08-06 RX ADMIN — BENZTROPINE MESYLATE 1 MG: 1 TABLET ORAL at 21:01

## 2022-08-06 RX ADMIN — DIVALPROEX SODIUM 250 MG: 250 TABLET, DELAYED RELEASE ORAL at 21:02

## 2022-08-06 RX ADMIN — DIVALPROEX SODIUM 250 MG: 250 TABLET, DELAYED RELEASE ORAL at 08:21

## 2022-08-06 RX ADMIN — MELATONIN 3 MG ORAL TABLET 3 MG: 3 TABLET ORAL at 21:01

## 2022-08-06 RX ADMIN — ARIPIPRAZOLE 10 MG: 10 TABLET ORAL at 08:21

## 2022-08-06 RX ADMIN — BENZTROPINE MESYLATE 1 MG: 1 TABLET ORAL at 09:20

## 2022-08-06 RX ADMIN — NICOTINE POLACRILEX 2 MG: 2 GUM, CHEWING BUCCAL at 14:55

## 2022-08-06 ASSESSMENT — PAIN SCALES - GENERAL: PAINLEVEL_OUTOF10: 0

## 2022-08-06 NOTE — CARE COORDINATION
SW met with pt. Pt observed laying in bed. Pt reports feeling good but tired today, denied SI/HI/AVH. Pt reports feeling a little better from when she was admitted. Pt plans to discharge home where she lives with her fiance. Pt plans to follow up with [facility] psychiatry at time of discharge. Pt cooperative, pleasant, with good eye contact, clear speech, and improved insight/judgement.

## 2022-08-06 NOTE — PROGRESS NOTES
BEHAVIORAL HEALTH FOLLOW-UP NOTE     8/6/2022     Patient was seen and examined in person, Chart reviewed   Patient's case discussed with staff/team    Chief Complaint: \"I am still okay with the medications, I am ready for the shot\"   Interim History:   Patient observed in up in the day room this morning, she is interacting with staff. States that she feels like she is getting better. She is bright, social, less manic, more linear. Responding well to treatment. Still minimizing circumstances of admission, however is fully participating in her treatment. Denies SI/HI intent or plan. Continues to have some hypomania, will increase abilify to 10 mg and plan for MERCHANT on Monday            Appetite:  [x] Normal/Unchanged  [] Increased  [] Decreased      Sleep:       [] Normal/Unchanged  [] Fair       [x] Poor              Energy:    [] Normal/Unchanged  [x] Increased  [] Decreased        SI [] Present  [x] Absent    HI  []Present  [x] Absent     Aggression:  [] yes  [x] no    Patient is [x] able  [] unable to CONTRACT FOR SAFETY     PAST MEDICAL/PSYCHIATRIC HISTORY:   Past Medical History:   Diagnosis Date    ADHD (attention deficit hyperactivity disorder)     Bipolar 1 disorder (HCC)     Pneumonia     PTSD (post-traumatic stress disorder)        FAMILY/SOCIAL HISTORY:  History reviewed. No pertinent family history.   Social History     Socioeconomic History    Marital status: Single     Spouse name: Not on file    Number of children: 0    Years of education: 14    Highest education level: Not on file   Occupational History     Employer:    Tobacco Use    Smoking status: Former     Packs/day: 0.10     Types: Cigarettes    Smokeless tobacco: Never   Vaping Use    Vaping Use: Every day   Substance and Sexual Activity    Alcohol use: No     Comment: rare    Drug use: Yes     Types: Marijuana (Weed)     Comment: couple times per month    Sexual activity: Yes     Partners: Male   Other Topics Concern    Not on file   Social History Narrative    Not on file     Social Determinants of Health     Financial Resource Strain: Not on file   Food Insecurity: Not on file   Transportation Needs: Not on file   Physical Activity: Not on file   Stress: Not on file   Social Connections: Not on file   Intimate Partner Violence: Not on file   Housing Stability: Not on file           ROS:  [x] All negative/unchanged except if checked.  Explain positive(checked items) below:  [] Constitutional  [] Eyes  [] Ear/Nose/Mouth/Throat  [] Respiratory  [] CV  [] GI  []   [] Musculoskeletal  [] Skin/Breast  [] Neurological  [] Endocrine  [] Heme/Lymph  [] Allergic/Immunologic    Explanation:     MEDICATIONS:    Current Facility-Administered Medications:     benztropine (COGENTIN) tablet 1 mg, 1 mg, Oral, BID, MUNDO Rae CNP    docusate sodium (COLACE) capsule 100 mg, 100 mg, Oral, BID, MUNDO Rae CNP, 100 mg at 08/05/22 1021    ARIPiprazole (ABILIFY) tablet 10 mg, 10 mg, Oral, Daily, MUNDO Rae CNP, 10 mg at 08/06/22 0821    ondansetron (ZOFRAN-ODT) disintegrating tablet 4 mg, 4 mg, Oral, Q8H PRN, MUNDO Rae CNP    divalproex (DEPAKOTE) DR tablet 250 mg, 250 mg, Oral, 2 times per day, MUNDO Rae CNP, 250 mg at 08/06/22 7788    ziprasidone (GEODON) injection 20 mg, 20 mg, IntraMUSCular, Once, Gissel Golds, DO    midazolam PF (VERSED) injection 2 mg, 2 mg, IntraMUSCular, Once, Gradie Golds, DO    diphenhydrAMINE (BENADRYL) injection 25 mg, 25 mg, IntraMUSCular, Once, Gradie Golds, DO    acetaminophen (TYLENOL) tablet 650 mg, 650 mg, Oral, Q6H PRN, Marylee Buckle, MD, 650 mg at 08/02/22 2042    magnesium hydroxide (MILK OF MAGNESIA) 400 MG/5ML suspension 30 mL, 30 mL, Oral, Daily PRN, Marylee Buckle, MD    nicotine (NICODERM CQ) 21 MG/24HR 1 patch, 1 patch, TransDERmal, Daily, Marylee Buckle, MD, 1 patch at 08/04/22 1053    aluminum & magnesium hydroxide-simethicone ([facility]) 422-868-05 MG/5ML suspension 30 mL, 30 mL, Oral, PRN, Stephen Eastman MD, 30 mL at 08/04/22 0847    hydrOXYzine pamoate (VISTARIL) capsule 50 mg, 50 mg, Oral, TID PRN, Stephen Eastman MD, 50 mg at 08/06/22 0359    haloperidol (HALDOL) tablet 5 mg, 5 mg, Oral, Q6H PRN, 5 mg at 08/02/22 2043 **OR** haloperidol lactate (HALDOL) injection 5 mg, 5 mg, IntraMUSCular, Q6H PRN, Stephen Eastman MD    melatonin tablet 3 mg, 3 mg, Oral, Nightly, Stephen Eastman MD, 3 mg at 08/05/22 2101      Examination:  /88   Pulse (!) 108   Temp 98.1 °F (36.7 °C) (Oral)   Resp 16   Ht 5' 6\" (1.676 m)   Wt 155 lb (70.3 kg)   LMP 07/11/2022   SpO2 98%   BMI 25.02 kg/m²   Gait - steady  Medication side effects(SE): None reported    Mental Status Examination:    Level of consciousness:  within normal limits   Appearance:  well-appearing  Behavior/Motor:  no abnormalities noted  Attitude toward examiner:  cooperative  Speech:  normal volume and hyperverbal   Mood: decreased range  Affect:  blunted  Thought processes:  overabundance of ideas, flight of ideas, and loose associations   Thought content: Denies suicidal or homicidal ideation intent or plan. Denies any auditory or visual hallucinations however she seems to have some Synagogue preoccupation questioning covert signs of psychosis. Cognition:  oriented to person, place, and time   Concentration distractible  Memory intact  Insight poor   Judgement poor   Fund of Knowledge adequate       ASSESSMENT:   Patient symptoms are:  [] Well controlled  [] Improving  [] Worsening  [x] No change      Diagnosis:   Principal Problem:    Severe manic bipolar 1 disorder with psychotic behavior ([facility] [address]  Active Problems:    Marijuana abuse  Resolved Problems:    * No resolved hospital problems. *      LABS:    No results for input(s): WBC, HGB, PLT in the last 72 hours. No results for input(s): NA, K, CL, CO2, BUN, CREATININE, GLUCOSE in the last 72 hours.     No results for input(s): BILITOT, ALKPHOS, AST, ALT in the last 72 hours. Lab Results   Component Value Date/Time    LABAMPH NOT DETECTED 08/01/2022 04:43 PM    BARBSCNU NOT DETECTED 08/01/2022 04:43 PM    LABBENZ NOT DETECTED 08/01/2022 04:43 PM    LABMETH NOT DETECTED 08/01/2022 04:43 PM    OPIATESCREENURINE NOT DETECTED 08/01/2022 04:43 PM    PHENCYCLIDINESCREENURINE NOT DETECTED 08/01/2022 04:43 PM    ETOH <10 08/01/2022 04:43 PM     Lab Results   Component Value Date/Time    TSH 1.790 07/17/2018 10:15 AM     No results found for: LITHIUM  Lab Results   Component Value Date    VALPROATE 81 08/05/2022           Treatment Plan:  The patient's diagnosis, treatment plan, medication management were formulated after patient was seen directly by the attending physician and myself and all relevant documentation was reviewed. Risk, benefit, side effects, possible outcomes of the medication and alternatives discussed with the patient and the patient demonstrated understanding. The patient was also educated that the outcome of treatment will depend on the medication compliance as directed by the prescribers along with regular follow-up, compliance with the labs and other work-up, as clinically indicated. The patient was referred to outpatient/inpatient substance abuse rehabilitation programming. She was educated multiple times during the hospitalization that if she chooses to continue to use drugs or alcohol, she may act out impulsively, resulting in serious harm to self or others even though unintentional.  She was also educated that mental health treatment cannot be optimized with ongoing use of drugs or alcohol she demonstrated understanding and has the capacity to understand that.   Patient denies any issues with substance abuse, stating that she has a medical marijuana card     Since patient is refusing to depakote and voicing concerns about the effects of this medication if she were to become pregnant we will consider changing her medication to Trileptal.    Abilify 10 mg daily  Plan for Abilify maintenna MERCHANT      Collateral information: Followed by social work  CD evaluation  Encourage patient to attend group and other milieu activities. Discharge planning discussed with the patient and treatment team.    PSYCHOTHERAPY/COUNSELING:  [x] Therapeutic interview  [x] Supportive  [] CBT  [] Ongoing  [] Other    [x] Patient continues to need, on a daily basis, active treatment furnished directly by or requiring the supervision of inpatient psychiatric personnel      Anticipated Length of stay: 5 to 7 days based on stability       NOTE: This report was transcribed using voice recognition software. Every effort was made to ensure accuracy; however, inadvertent computerized transcription errors may be present.     Electronically signed by MUNDO Quinn CNP on 8/6/2022 at 9:08 AM

## 2022-08-06 NOTE — PROGRESS NOTES
Attended afternoon leisure group. Enjoyed game stations ([employer], and [employer] [facility] [place]), socializing and interacting with peers. Was 1 of 5 in attendance.

## 2022-08-06 NOTE — PLAN OF CARE
Problem: Anxiety  Goal: Will report anxiety at manageable levels  Description: INTERVENTIONS:  1. Administer medication as ordered  2. Teach and rehearse alternative coping skills  3. Provide emotional support with 1:1 interaction with staff  8/5/2022 2115 by Gilmar Friend RN  Outcome: Progressing  8/5/2022 0842 by Bairon Sandra RN  Outcome: Progressing     Problem: Coping  Goal: Pt/Family able to verbalize concerns and demonstrate effective coping strategies  Description: INTERVENTIONS:  1. Assist patient/family to identify coping skills, available support systems and cultural and spiritual values  2. Provide emotional support, including active listening and acknowledgement of concerns of patient and caregivers  3. Reduce environmental stimuli, as able  4. Instruct patient/family in relaxation techniques, as appropriate  5. Assess for spiritual pain/suffering and initiate Spiritual Care, Psychosocial Clinical Specialist consults as needed  8/5/2022 2115 by Gilmar Friend RN  Outcome: Progressing  8/5/2022 0842 by Bairon Sandra RN  Outcome: Progressing     Problem: Behavior  Goal: Pt/Family maintain appropriate behavior and adhere to behavioral management agreement, if implemented  Description: INTERVENTIONS:  1. Assess patient/family's coping skills and  non-compliant behavior (including use of illegal substances)  2. Notify security of behavior or suspected illegal substances which indicate the need for search of the family and/or belongings  3. Encourage verbalization of thoughts and concerns in a socially appropriate manner  4. Utilize positive, consistent limit setting strategies supporting safety of patient, staff and others  5. Encourage participation in the decision making process about the behavioral management agreement  6. If a visitor's behavior poses a threat to safety call refer to organization policy.   7. Initiate consult with , Psychosocial CNS, Spiritual Care as appropriate  8/5/2022 2115 by Elizabet Yepez RN  Outcome: Progressing  8/5/2022 0842 by Camilo Singer RN  Outcome: Progressing

## 2022-08-06 NOTE — PROGRESS NOTES
Attended evening relaxation group. Active and engaged during guided imagery activity. Was 1 of 6 in attendance.

## 2022-08-06 NOTE — GROUP NOTE
Group Therapy Note    Date: 8/6/2022    Group Start Time: 2116  Group End Time: 5220  Group Topic: Psychoeducation    [facility]        Group Therapy Note    Attendees: 4       Notes: Active and engaged during discussion on managing emotions. Understanding of handout on A.C.C.E.P.T.S. Status After Intervention:  Improved    Participation Level:  Active Listener and Interactive    Participation Quality: Appropriate, Attentive, and Sharing      Speech:  normal      Thought Process/Content: Logical      Affective Functioning: Congruent      Mood: euthymic      Level of consciousness:  Alert and Attentive      Response to Learning: Capable of insight, Able to change behavior, and Progressing to goal      Endings: None Reported    Modes of Intervention: Education, Support, Socialization, and Exploration      Discipline Responsible: Psychoeducational Specialist      Signature:  Lesia Boswell, [address]

## 2022-08-06 NOTE — PLAN OF CARE
Problem: Anxiety  Goal: Will report anxiety at manageable levels  Description: INTERVENTIONS:  1. Administer medication as ordered  2. Teach and rehearse alternative coping skills  3. Provide emotional support with 1:1 interaction with staff  Outcome: Progressing     Problem: Coping  Goal: Pt/Family able to verbalize concerns and demonstrate effective coping strategies  Description: INTERVENTIONS:  1. Assist patient/family to identify coping skills, available support systems and cultural and spiritual values  2. Provide emotional support, including active listening and acknowledgement of concerns of patient and caregivers  3. Reduce environmental stimuli, as able  4. Instruct patient/family in relaxation techniques, as appropriate  5. Assess for spiritual pain/suffering and initiate Spiritual Care, Psychosocial Clinical Specialist consults as needed  Outcome: Progressing     Problem: Decision Making  Goal: Pt/Family able to effectively weigh alternatives and participate in decision making related to treatment and care  Description: INTERVENTIONS:  1. Determine when there are differences between patient's view, family's view, and healthcare provider's view of condition  2. Facilitate patient and family articulation of goals for care  3. Help patient and family identify pros/cons of alternative solutions  4. Provide information as requested by patient/family  5. Respect patient/family right to receive or not to receive information  6. Serve as a liaison between patient and family and health care team  7. Initiate Consults from Ethics, Palliative Care or initiate [address] as is appropriate  Outcome: Progressing     Problem: Behavior  Goal: Pt/Family maintain appropriate behavior and adhere to behavioral management agreement, if implemented  Description: INTERVENTIONS:  1. Assess patient/family's coping skills and  non-compliant behavior (including use of illegal substances)  2.  Notify security of behavior or suspected illegal substances which indicate the need for search of the family and/or belongings  3. Encourage verbalization of thoughts and concerns in a socially appropriate manner  4. Utilize positive, consistent limit setting strategies supporting safety of patient, staff and others  5. Encourage participation in the decision making process about the behavioral management agreement  6. If a visitor's behavior poses a threat to safety call refer to organization policy. 7. Initiate consult with , Psychosocial CNS, Spiritual Care as appropriate  Outcome: Progressing     Problem: Depression/Self Harm  Goal: Effect of psychiatric condition will be minimized and patient will be protected from self harm  Description: INTERVENTIONS:  1. Assess impact of patient's symptoms on level of functioning, self care needs and offer support as indicated  2. Assess patient/family knowledge of depression, impact on illness and need for teaching  3. Provide emotional support, presence and reassurance  4. Assess for possible suicidal thoughts or ideation. If patient expresses suicidal thoughts or statements do not leave alone, initiate Suicide Precautions, move to a room close to the nursing station and obtain sitter  5. Initiate consults as appropriate with Mental Health Professional, Spiritual Care, Psychosocial CNS, and consider a recommendation to the LIP for a Psychiatric Consultation  Outcome: Progressing     Pt denies SI/HI and AVH. Pt reports decreased anxiety and is observed to less anxious. Pt has been social and less manic than yesterday. She attended group and is med compliant. Pt has not been tearful on the phone today. She did report feeling  more tired today. Will continue to monitor and provide support.

## 2022-08-07 PROCEDURE — 1240000000 HC EMOTIONAL WELLNESS R&B

## 2022-08-07 PROCEDURE — 99231 SBSQ HOSP IP/OBS SF/LOW 25: CPT | Performed by: NURSE PRACTITIONER

## 2022-08-07 PROCEDURE — 6370000000 HC RX 637 (ALT 250 FOR IP): Performed by: NURSE PRACTITIONER

## 2022-08-07 PROCEDURE — 6370000000 HC RX 637 (ALT 250 FOR IP): Performed by: PSYCHIATRY & NEUROLOGY

## 2022-08-07 RX ORDER — ARIPIPRAZOLE 15 MG/1
15 TABLET ORAL DAILY
Status: DISCONTINUED | OUTPATIENT
Start: 2022-08-08 | End: 2022-08-09 | Stop reason: HOSPADM

## 2022-08-07 RX ORDER — ARIPIPRAZOLE 5 MG/1
5 TABLET ORAL ONCE
Status: COMPLETED | OUTPATIENT
Start: 2022-08-07 | End: 2022-08-07

## 2022-08-07 RX ADMIN — MELATONIN 3 MG ORAL TABLET 3 MG: 3 TABLET ORAL at 21:22

## 2022-08-07 RX ADMIN — BENZTROPINE MESYLATE 1 MG: 1 TABLET ORAL at 21:22

## 2022-08-07 RX ADMIN — NICOTINE POLACRILEX 2 MG: 2 GUM, CHEWING BUCCAL at 01:04

## 2022-08-07 RX ADMIN — DIVALPROEX SODIUM 250 MG: 250 TABLET, DELAYED RELEASE ORAL at 21:22

## 2022-08-07 RX ADMIN — NICOTINE POLACRILEX 2 MG: 2 GUM, CHEWING BUCCAL at 14:35

## 2022-08-07 RX ADMIN — NICOTINE POLACRILEX 2 MG: 2 GUM, CHEWING BUCCAL at 09:17

## 2022-08-07 RX ADMIN — ARIPIPRAZOLE 10 MG: 10 TABLET ORAL at 07:46

## 2022-08-07 RX ADMIN — ARIPIPRAZOLE 5 MG: 5 TABLET ORAL at 10:55

## 2022-08-07 RX ADMIN — BENZTROPINE MESYLATE 1 MG: 1 TABLET ORAL at 07:46

## 2022-08-07 RX ADMIN — DIVALPROEX SODIUM 250 MG: 250 TABLET, DELAYED RELEASE ORAL at 07:45

## 2022-08-07 ASSESSMENT — PAIN SCALES - GENERAL: PAINLEVEL_OUTOF10: 0

## 2022-08-07 NOTE — PLAN OF CARE
Problem: Anxiety  Goal: Will report anxiety at manageable levels  Description: INTERVENTIONS:  1. Administer medication as ordered  2. Teach and rehearse alternative coping skills  3. Provide emotional support with 1:1 interaction with staff  Outcome: Progressing     Problem: Decision Making  Goal: Pt/Family able to effectively weigh alternatives and participate in decision making related to treatment and care  Description: INTERVENTIONS:  1. Determine when there are differences between patient's view, family's view, and healthcare provider's view of condition  2. Facilitate patient and family articulation of goals for care  3. Help patient and family identify pros/cons of alternative solutions  4. Provide information as requested by patient/family  5. Respect patient/family right to receive or not to receive information  6. Serve as a liaison between patient and family and health care team  7. Initiate Consults from Ethics, Palliative Care or initiate [address] [place] [address] [place] as is appropriate  Outcome: Progressing     Problem: Behavior  Goal: Pt/Family maintain appropriate behavior and adhere to behavioral management agreement, if implemented  Description: INTERVENTIONS:  1. Assess patient/family's coping skills and  non-compliant behavior (including use of illegal substances)  2. Notify security of behavior or suspected illegal substances which indicate the need for search of the family and/or belongings  3. Encourage verbalization of thoughts and concerns in a socially appropriate manner  4. Utilize positive, consistent limit setting strategies supporting safety of patient, staff and others  5. Encourage participation in the decision making process about the behavioral management agreement  6. If a visitor's behavior poses a threat to safety call refer to organization policy.   7. Initiate consult with , Psychosocial CNS, Spiritual Care as appropriate  Outcome: Progressing     Problem: Depression/Self Harm  Goal: Effect of psychiatric condition will be minimized and patient will be protected from self harm  Description: INTERVENTIONS:  1. Assess impact of patient's symptoms on level of functioning, self care needs and offer support as indicated  2. Assess patient/family knowledge of depression, impact on illness and need for teaching  3. Provide emotional support, presence and reassurance  4. Assess for possible suicidal thoughts or ideation. If patient expresses suicidal thoughts or statements do not leave alone, initiate Suicide Precautions, move to a room close to the nursing station and obtain sitter  5. Initiate consults as appropriate with Mental Health Professional, Spiritual Care, Psychosocial CNS, and consider a recommendation to the LIP for a Psychiatric Consultation  Outcome: Progressing     Pt denies SI/HI and AVH. She denies anxiety and depression. She is bright, pleasant, and cooperative. Med compliant and attends groups. She is social on the unit. Will continue to monitor and provide support.

## 2022-08-07 NOTE — PROGRESS NOTES
Patient A+Ox 4, denies SI, HI, and internal stimulation. Both anxiety and depression are denied. Patient presents with a pleasant and bright demeanor. Demonstrates good eye contact when communicating. She's observed smiling and laughing with peers. She is social and cooperative with staff. No behaviors, she's observed out on the unit participating in a group therapy session. Patient complies with the medication regimen. Patient will continue to work toward discharge goals which includes medication compliance and group participation. Staff will continue to offer and provide interventions per request and as required.

## 2022-08-07 NOTE — PROGRESS NOTES
Attended evening relaxation activity. Able to follow guided meditation to improve wellness skills. Was 1 of 4 in attendance.

## 2022-08-07 NOTE — PROGRESS NOTES
BEHAVIORAL HEALTH FOLLOW-UP NOTE     8/7/2022     Patient was seen and examined in person, Chart reviewed   Patient's case discussed with staff/team    Chief Complaint: \"I I have a couple of questions today\"  Interim History:   Patient observed in her room this morning. She is bright pleasant social.  She is asking appropriate questions regarding her medications. She is able to talk to me about her future plans with rigidness of detail. She is interested in the IOP program here at [address]. She is interacting with staff. States that she feels like she is getting better. She is bright, social, less manic, more linear. Responding well to treatment. Is fully participating in her treatment. Denies SI/HI intent or plan. Plan for [facility] on Monday this has been discussed with the patient she remains agreeable. Appetite:  [x] Normal/Unchanged  [] Increased  [] Decreased      Sleep:       [] Normal/Unchanged  [] Fair       [x] Poor              Energy:    [] Normal/Unchanged  [x] Increased  [] Decreased        SI [] Present  [x] Absent    HI  []Present  [x] Absent     Aggression:  [] yes  [x] no    Patient is [x] able  [] unable to CONTRACT FOR SAFETY     PAST MEDICAL/PSYCHIATRIC HISTORY:   Past Medical History:   Diagnosis Date    ADHD (attention deficit hyperactivity disorder)     Bipolar 1 disorder (HCC)     Pneumonia     PTSD (post-traumatic stress disorder)        FAMILY/SOCIAL HISTORY:  History reviewed. No pertinent family history.   Social History     Socioeconomic History    Marital status: Single     Spouse name: Not on file    Number of children: 0    Years of education: 14    Highest education level: Not on file   Occupational History     Employer:    Tobacco Use    Smoking status: Former     Packs/day: 0.10     Types: Cigarettes    Smokeless tobacco: Never   Vaping Use    Vaping Use: Every day   Substance and Sexual Activity    Alcohol use: No     Comment: rare    Drug use: Yes     Types: Marijuana Margo Klein)     Comment: couple times per month    Sexual activity: Yes     Partners: Male   Other Topics Concern    Not on file   Social History Narrative    Not on file     Social Determinants of Health     Financial Resource Strain: Not on file   Food Insecurity: Not on file   Transportation Needs: Not on file   Physical Activity: Not on file   Stress: Not on file   Social Connections: Not on file   Intimate Partner Violence: Not on file   Housing Stability: Not on file           ROS:  [x] All negative/unchanged except if checked.  Explain positive(checked items) below:  [] Constitutional  [] Eyes  [] Ear/Nose/Mouth/Throat  [] Respiratory  [] CV  [] GI  []   [] Musculoskeletal  [] Skin/Breast  [] Neurological  [] Endocrine  [] Heme/Lymph  [] Allergic/Immunologic    Explanation:     MEDICATIONS:    Current Facility-Administered Medications:     ARIPiprazole (ABILIFY) tablet 5 mg, 5 mg, Oral, Once, Silver Embs, APRN - CNP    [START ON 8/8/2022] ARIPiprazole lauroxil (ARISTADA) injection 662 mg, 662 mg, IntraMUSCular, Once, Silver Embs, APRN - CNP    [START ON 8/8/2022] ARIPiprazole (ABILIFY) tablet 15 mg, 15 mg, Oral, Daily, Silver Embs, APRN - CNP    benztropine (COGENTIN) tablet 1 mg, 1 mg, Oral, BID, Silver Embs, APRN - CNP, 1 mg at 08/07/22 0746    nicotine polacrilex (NICORETTE) gum 2 mg, 2 mg, Oral, Q2H PRN, Silver Embs, APRN - CNP, 2 mg at 08/07/22 8067    docusate sodium (COLACE) capsule 100 mg, 100 mg, Oral, BID, Silver Embs, APRN - CNP, 100 mg at 08/05/22 1021    ondansetron (ZOFRAN-ODT) disintegrating tablet 4 mg, 4 mg, Oral, Q8H PRN, Silver Embs, APRN - CNP    divalproex (DEPAKOTE) DR tablet 250 mg, 250 mg, Oral, 2 times per day, Silver Embs, APRN - CNP, 250 mg at 08/07/22 0745    ziprasidone (GEODON) injection 20 mg, 20 mg, IntraMUSCular, Once, Patharithae Ovid, DO    midazolam PF (VERSED) injection 2 mg, 2 mg, IntraMUSCular, Once, Nancy Glover,     diphenhydrAMINE (BENADRYL) injection 25 mg, 25 mg, IntraMUSCular, Once, Blanquita King,     acetaminophen (TYLENOL) tablet 650 mg, 650 mg, Oral, Q6H PRN, Jessica Russo MD, 650 mg at 08/02/22 2042    magnesium hydroxide (MILK OF MAGNESIA) 400 MG/5ML suspension 30 mL, 30 mL, Oral, Daily PRN, Jessica Russo MD    aluminum & magnesium hydroxide-simethicone (MAALOX) 200-200-20 MG/5ML suspension 30 mL, 30 mL, Oral, PRN, Jessica Russo MD, 30 mL at 08/04/22 0847    hydrOXYzine pamoate (VISTARIL) capsule 50 mg, 50 mg, Oral, TID PRN, Jessica Russo MD, 50 mg at 08/06/22 2101    haloperidol (HALDOL) tablet 5 mg, 5 mg, Oral, Q6H PRN, 5 mg at 08/02/22 2043 **OR** haloperidol lactate (HALDOL) injection 5 mg, 5 mg, IntraMUSCular, Q6H PRN, Jessica Russo MD    melatonin tablet 3 mg, 3 mg, Oral, Nightly, Jessica Russo MD, 3 mg at 08/06/22 2101      Examination:  /84   Pulse (!) 101   Temp 97.8 °F (36.6 °C) (Oral)   Resp 16   Ht 5' 6\" (1.676 m)   Wt 155 lb (70.3 kg)   LMP 07/11/2022   SpO2 100%   BMI 25.02 kg/m²   Gait - steady  Medication side effects(SE): None reported    Mental Status Examination:    Level of consciousness:  within normal limits   Appearance:  well-appearing  Behavior/Motor:  no abnormalities noted  Attitude toward examiner:  cooperative  Speech:  normal volume and hyperverbal   Mood: decreased range  Affect:  blunted  Thought processes: Linear and future focus goal-directed  Thought content: Denies suicidal or homicidal ideation intent or plan. Cognition:  oriented to person, place, and time   Concentration distractible  Memory intact  Insight improving  Judgement improving  Fund of Knowledge adequate       ASSESSMENT:   Patient symptoms are:  [] Well controlled  [x] Improving  [] Worsening  [] No change      Diagnosis:   Principal Problem:    Severe manic bipolar 1 disorder with psychotic behavior ([facility] [address]  Active Problems:    Marijuana abuse  Resolved Problems:    * No resolved hospital problems. *      LABS:    No results for input(s): WBC, HGB, PLT in the last 72 hours. No results for input(s): NA, K, CL, CO2, BUN, CREATININE, GLUCOSE in the last 72 hours. No results for input(s): BILITOT, ALKPHOS, AST, ALT in the last 72 hours. Lab Results   Component Value Date/Time    LABAMPH NOT DETECTED 08/01/2022 04:43 PM    BARBSCNU NOT DETECTED 08/01/2022 04:43 PM    LABBENZ NOT DETECTED 08/01/2022 04:43 PM    LABMETH NOT DETECTED 08/01/2022 04:43 PM    OPIATESCREENURINE NOT DETECTED 08/01/2022 04:43 PM    PHENCYCLIDINESCREENURINE NOT DETECTED 08/01/2022 04:43 PM    ETOH <10 08/01/2022 04:43 PM     Lab Results   Component Value Date/Time    TSH 1.790 07/17/2018 10:15 AM     No results found for: LITHIUM  Lab Results   Component Value Date    VALPROATE 81 08/05/2022           Treatment Plan:  The patient's diagnosis, treatment plan, medication management were formulated after patient was seen directly by the attending physician and myself and all relevant documentation was reviewed. Risk, benefit, side effects, possible outcomes of the medication and alternatives discussed with the patient and the patient demonstrated understanding. The patient was also educated that the outcome of treatment will depend on the medication compliance as directed by the prescribers along with regular follow-up, compliance with the labs and other work-up, as clinically indicated. The patient was referred to outpatient/inpatient substance abuse rehabilitation programming. She was educated multiple times during the hospitalization that if she chooses to continue to use drugs or alcohol, she may act out impulsively, resulting in serious harm to self or others even though unintentional.  She was also educated that mental health treatment cannot be optimized with ongoing use of drugs or alcohol she demonstrated understanding and has the capacity to understand that.   Patient denies any issues with substance abuse, stating that she has a medical marijuana card     Depakote 250 mg twice daily  Abilify 15 mg daily  Plan for Aristada 662 mg IM every 30 days first dose due 8/8/2022      Collateral information: Followed by social work  CD evaluation  Encourage patient to attend group and other milieu activities. Discharge planning discussed with the patient and treatment team.    PSYCHOTHERAPY/COUNSELING:  [x] Therapeutic interview  [x] Supportive  [] CBT  [] Ongoing  [] Other    [x] Patient continues to need, on a daily basis, active treatment furnished directly by or requiring the supervision of inpatient psychiatric personnel      Anticipated Length of stay: 5 to 7 days based on stability       NOTE: This report was transcribed using voice recognition software. Every effort was made to ensure accuracy; however, inadvertent computerized transcription errors may be present.     Electronically signed by Charmayne Sheer, APRN - CNP on 8/7/2022 at 10:36 AM

## 2022-08-07 NOTE — GROUP NOTE
Group Therapy Note    Date: 8/7/2022    Group Start Time: 1430  Group End Time: 1510  Group Topic: Cognitive Skills    [facility]    KATY Rosales, BERHANE        Group Therapy Note    Attendees: 3       Patient's Goal: To participate in group discussion on self management. Notes: pt was an active participant in group discussion. Status After Intervention:  Improved    Participation Level:  Active Listener and Interactive    Participation Quality: Appropriate, Attentive, Sharing, and Supportive      Speech:  normal      Thought Process/Content: Logical      Affective Functioning: Congruent      Mood: anxious      Level of consciousness:  Alert and Oriented x4      Response to Learning: Able to verbalize current knowledge/experience      Endings: None Reported    Modes of Intervention: Education, Support, Socialization, Exploration, Clarifying, and Problem-solving      Discipline Responsible: /Counselor      Signature:  KATY Ortez, BERHANE

## 2022-08-08 PROCEDURE — 6370000000 HC RX 637 (ALT 250 FOR IP): Performed by: PSYCHIATRY & NEUROLOGY

## 2022-08-08 PROCEDURE — 1240000000 HC EMOTIONAL WELLNESS R&B

## 2022-08-08 PROCEDURE — 99231 SBSQ HOSP IP/OBS SF/LOW 25: CPT | Performed by: NURSE PRACTITIONER

## 2022-08-08 PROCEDURE — 6370000000 HC RX 637 (ALT 250 FOR IP): Performed by: NURSE PRACTITIONER

## 2022-08-08 RX ADMIN — BENZTROPINE MESYLATE 1 MG: 1 TABLET ORAL at 20:30

## 2022-08-08 RX ADMIN — DIVALPROEX SODIUM 250 MG: 250 TABLET, DELAYED RELEASE ORAL at 08:24

## 2022-08-08 RX ADMIN — BENZTROPINE MESYLATE 1 MG: 1 TABLET ORAL at 08:23

## 2022-08-08 RX ADMIN — DIVALPROEX SODIUM 250 MG: 250 TABLET, DELAYED RELEASE ORAL at 20:30

## 2022-08-08 RX ADMIN — NICOTINE POLACRILEX 2 MG: 2 GUM, CHEWING BUCCAL at 09:14

## 2022-08-08 RX ADMIN — HYDROXYZINE PAMOATE 50 MG: 50 CAPSULE ORAL at 15:29

## 2022-08-08 RX ADMIN — ARIPIPRAZOLE 15 MG: 15 TABLET ORAL at 08:23

## 2022-08-08 RX ADMIN — NICOTINE POLACRILEX 2 MG: 2 GUM, CHEWING BUCCAL at 12:44

## 2022-08-08 RX ADMIN — MELATONIN 3 MG ORAL TABLET 3 MG: 3 TABLET ORAL at 20:30

## 2022-08-08 RX ADMIN — DOCUSATE SODIUM 100 MG: 100 CAPSULE, LIQUID FILLED ORAL at 08:24

## 2022-08-08 ASSESSMENT — PAIN SCALES - GENERAL
PAINLEVEL_OUTOF10: 0
PAINLEVEL_OUTOF10: 0

## 2022-08-08 NOTE — PROGRESS NOTES
Patient A+Ox 4, denies SI, HI, and internal stimulation. Patient denies both anxiety and depression. She presents with a brightened affect, and is sociable with peers. Patient is calm, pleasant, and cooperative with staff. She demonstrates good eye contact when communicating and complies with the medication regimen. She states to feel \"good,\" and states that the meds are working. No behaviors, patient observed sitting at table with peers playing card game. Patient will continue to work toward discharge goals which includes medication compliance and group participation. Staff will continue to offer and provide support and interventions per request and as required.

## 2022-08-08 NOTE — GROUP NOTE
Group Therapy Note    Date: 8/8/2022    Group Start Time: 1055  Group End Time: 1130  Group Topic: Cognitive Skills    [facility]    KATY Sandoval LSW        Group Therapy Note    Attendees: 2       Patient's Goal:  Pt will be able to discuss grief facts during group. Notes:  Pt made connections and participated in group. Status After Intervention:  Improved    Participation Level:  Active Listener and Interactive    Participation Quality: Appropriate, Attentive, Sharing, and Supportive      Speech:  normal      Thought Process/Content: Logical  Linear      Affective Functioning: Congruent      Mood: depressed      Level of consciousness:  Alert, Oriented x4, and Attentive      Response to Learning: Able to verbalize current knowledge/experience      Endings: None Reported    Modes of Intervention: Education, Support, Socialization, and Exploration      Discipline Responsible: /Counselor      Signature:  KATY Sandoval LSW

## 2022-08-08 NOTE — PROGRESS NOTES
[id number] [facility] Interdisciplinary Treatment Plan Note     Review Date & Time: 08/08/2022  0900    Patient was in treatment team.    Estimated Length of Stay Update:  7-10   Estimated Discharge Date Update: 08/09/2022    EDUCATION:   Learner Progress Toward Treatment Goals: Reviewed results and recommendations of this team    Method: Small group    Outcome: Verbalized understanding    PATIENT GOALS: None at this time. PLAN/TREATMENT RECOMMENDATIONS UPDATE: Encourage patient to attend and participate in groups. Take medication as prescribed. GOALS UPDATE:  Time frame for Short-Term Goals: Prior to discharge.       Maile Mcmahon RN

## 2022-08-08 NOTE — PLAN OF CARE
Problem: Anxiety  Goal: Will report anxiety at manageable levels  Description: INTERVENTIONS:  1. Administer medication as ordered  2. Teach and rehearse alternative coping skills  3. Provide emotional support with 1:1 interaction with staff  8/7/2022 2022 by Best Emmanuel RN  Outcome: Progressing  8/7/2022 1426 by Bhanu Torres RN  Outcome: Progressing     Problem: Coping  Goal: Pt/Family able to verbalize concerns and demonstrate effective coping strategies  Description: INTERVENTIONS:  1. Assist patient/family to identify coping skills, available support systems and cultural and spiritual values  2. Provide emotional support, including active listening and acknowledgement of concerns of patient and caregivers  3. Reduce environmental stimuli, as able  4. Instruct patient/family in relaxation techniques, as appropriate  5. Assess for spiritual pain/suffering and initiate Spiritual Care, Psychosocial Clinical Specialist consults as needed  Outcome: Progressing     Problem: Decision Making  Goal: Pt/Family able to effectively weigh alternatives and participate in decision making related to treatment and care  Description: INTERVENTIONS:  1. Determine when there are differences between patient's view, family's view, and healthcare provider's view of condition  2. Facilitate patient and family articulation of goals for care  3. Help patient and family identify pros/cons of alternative solutions  4. Provide information as requested by patient/family  5. Respect patient/family right to receive or not to receive information  6. Serve as a liaison between patient and family and health care team  7.  Initiate Consults from Ethics, Palliative Care or initiate [address] as is appropriate  8/7/2022 2022 by Best Emmanuel RN  Outcome: Progressing  8/7/2022 1426 by Bhanu Torres RN  Outcome: Progressing

## 2022-08-08 NOTE — PROGRESS NOTES
BEHAVIORAL HEALTH FOLLOW-UP NOTE     8/8/2022     Patient was seen and examined in person, Chart reviewed   Patient's case discussed with staff/team    Chief Complaint: \"I have a couple more questions. \"  Interim History:   Patient observed in the dayroom. She is bright pleasant social.  She is asking appropriate questions regarding her medications. She is able to talk to me about her future plans with rigidness of detail. She is interacting with staff. States that she feels like she is getting better. She is bright, social, less manic, more linear. Responding well to treatment. Is fully participating in her treatment. Denies SI/HI intent or plan. Plan for MERCHANT on Monday this has been discussed with the patient she remains agreeable. She tells me that previously she had been administering her own IM invega. Patient encouraged not to administer her IM MERCHANT, that she would need to see a nurse for administration for this injection. Appetite:  [x] Normal/Unchanged  [] Increased  [] Decreased      Sleep:       [] Normal/Unchanged  [] Fair       [x] Poor              Energy:    [] Normal/Unchanged  [x] Increased  [] Decreased        SI [] Present  [x] Absent    HI  []Present  [x] Absent     Aggression:  [] yes  [x] no    Patient is [x] able  [] unable to CONTRACT FOR SAFETY     PAST MEDICAL/PSYCHIATRIC HISTORY:   Past Medical History:   Diagnosis Date    ADHD (attention deficit hyperactivity disorder)     Bipolar 1 disorder (HCC)     Pneumonia     PTSD (post-traumatic stress disorder)        FAMILY/SOCIAL HISTORY:  History reviewed. No pertinent family history.   Social History     Socioeconomic History    Marital status: Single     Spouse name: Not on file    Number of children: 0    Years of education: 14    Highest education level: Not on file   Occupational History     Employer:    Tobacco Use    Smoking status: Former     Packs/day: 0.10     Types: Cigarettes    Smokeless tobacco: Never   Vaping Use Vaping Use: Every day   Substance and Sexual Activity    Alcohol use: No     Comment: rare    Drug use: Yes     Types: Marijuana (Weed)     Comment: couple times per month    Sexual activity: Yes     Partners: Male   Other Topics Concern    Not on file   Social History Narrative    Not on file     Social Determinants of Health     Financial Resource Strain: Not on file   Food Insecurity: Not on file   Transportation Needs: Not on file   Physical Activity: Not on file   Stress: Not on file   Social Connections: Not on file   Intimate Partner Violence: Not on file   Housing Stability: Not on file           ROS:  [x] All negative/unchanged except if checked.  Explain positive(checked items) below:  [] Constitutional  [] Eyes  [] Ear/Nose/Mouth/Throat  [] Respiratory  [] CV  [] GI  []   [] Musculoskeletal  [] Skin/Breast  [] Neurological  [] Endocrine  [] Heme/Lymph  [] Allergic/Immunologic    Explanation:     MEDICATIONS:    Current Facility-Administered Medications:     ARIPiprazole (ABILIFY) tablet 15 mg, 15 mg, Oral, Daily, Elijah Gauze, APRN - CNP, 15 mg at 08/08/22 7898    benztropine (COGENTIN) tablet 1 mg, 1 mg, Oral, BID, Elijah Gauze, APRN - CNP, 1 mg at 08/08/22 7650    nicotine polacrilex (NICORETTE) gum 2 mg, 2 mg, Oral, Q2H PRN, Leijah Gauze, APRN - CNP, 2 mg at 08/08/22 1244    docusate sodium (COLACE) capsule 100 mg, 100 mg, Oral, BID, Elijah Gauze, APRN - CNP, 100 mg at 08/08/22 0824    ondansetron (ZOFRAN-ODT) disintegrating tablet 4 mg, 4 mg, Oral, Q8H PRN, Elijah Gauze, APRN - CNP    divalproex (DEPAKOTE) DR tablet 250 mg, 250 mg, Oral, 2 times per day, Elijah Gauze, APRN - CNP, 250 mg at 08/08/22 9426    ziprasidone (GEODON) injection 20 mg, 20 mg, IntraMUSCular, Once, Blanquita Dock, DO    midazolam PF (VERSED) injection 2 mg, 2 mg, IntraMUSCular, Once, Blanquita Dock, DO    diphenhydrAMINE (BENADRYL) injection 25 mg, 25 mg, IntraMUSCular, Once, Blanquita Dock, DO    acetaminophen (TYLENOL) tablet 650 mg, 650 mg, Oral, Q6H PRN, Stephen Eastman MD, 650 mg at 08/02/22 2042    magnesium hydroxide (MILK OF MAGNESIA) 400 MG/5ML suspension 30 mL, 30 mL, Oral, Daily PRN, Stephen Eastman MD    aluminum & magnesium hydroxide-simethicone (MAALOX) 200-200-20 MG/5ML suspension 30 mL, 30 mL, Oral, PRN, Stephen Eastman MD, 30 mL at 08/04/22 0847    hydrOXYzine pamoate (VISTARIL) capsule 50 mg, 50 mg, Oral, TID PRN, Stephen Eastman MD, 50 mg at 08/06/22 2101    haloperidol (HALDOL) tablet 5 mg, 5 mg, Oral, Q6H PRN, 5 mg at 08/02/22 2043 **OR** haloperidol lactate (HALDOL) injection 5 mg, 5 mg, IntraMUSCular, Q6H PRN, Stephen Eastman MD    melatonin tablet 3 mg, 3 mg, Oral, Nightly, Stephen Eastman MD, 3 mg at 08/07/22 2122      Examination:  /73   Pulse 98   Temp 97.9 °F (36.6 °C) (Oral)   Resp 16   Ht 5' 6\" (1.676 m)   Wt 155 lb (70.3 kg)   LMP 07/11/2022   SpO2 99%   BMI 25.02 kg/m²   Gait - steady  Medication side effects(SE): None reported    Mental Status Examination:    Level of consciousness:  within normal limits   Appearance:  well-appearing  Behavior/Motor:  no abnormalities noted  Attitude toward examiner:  cooperative  Speech:  normal volume and hyperverbal   Mood: decreased range  Affect:  blunted  Thought processes: Linear and future focus goal-directed  Thought content: Denies suicidal or homicidal ideation intent or plan. Cognition:  oriented to person, place, and time   Concentration distractible  Memory intact  Insight improving  Judgement improving  Fund of Knowledge adequate       ASSESSMENT:   Patient symptoms are:  [x] Well controlled  [x] Improving  [] Worsening  [] No change      Diagnosis:   Principal Problem:    Severe manic bipolar 1 disorder with psychotic behavior ([address]  Active Problems:    Marijuana abuse  Resolved Problems:    * No resolved hospital problems. *      LABS:    No results for input(s): WBC, HGB, PLT in the last 72 hours.     No results for input(s): NA, K, CL, CO2, BUN, CREATININE, GLUCOSE in the last 72 hours. No results for input(s): BILITOT, ALKPHOS, AST, ALT in the last 72 hours. Lab Results   Component Value Date/Time    LABAMPH NOT DETECTED 08/01/2022 04:43 PM    BARBSCNU NOT DETECTED 08/01/2022 04:43 PM    LABBENZ NOT DETECTED 08/01/2022 04:43 PM    LABMETH NOT DETECTED 08/01/2022 04:43 PM    OPIATESCREENURINE NOT DETECTED 08/01/2022 04:43 PM    PHENCYCLIDINESCREENURINE NOT DETECTED 08/01/2022 04:43 PM    ETOH <10 08/01/2022 04:43 PM     Lab Results   Component Value Date/Time    TSH 1.790 07/17/2018 10:15 AM     No results found for: LITHIUM  Lab Results   Component Value Date    VALPROATE 81 08/05/2022           Treatment Plan:  The patient's diagnosis, treatment plan, medication management were formulated after patient was seen directly by the attending physician and myself and all relevant documentation was reviewed. Risk, benefit, side effects, possible outcomes of the medication and alternatives discussed with the patient and the patient demonstrated understanding. The patient was also educated that the outcome of treatment will depend on the medication compliance as directed by the prescribers along with regular follow-up, compliance with the labs and other work-up, as clinically indicated. The patient was referred to outpatient/inpatient substance abuse rehabilitation programming. She was educated multiple times during the hospitalization that if she chooses to continue to use drugs or alcohol, she may act out impulsively, resulting in serious harm to self or others even though unintentional.  She was also educated that mental health treatment cannot be optimized with ongoing use of drugs or alcohol she demonstrated understanding and has the capacity to understand that.   Patient denies any issues with substance abuse, stating that she has a medical marijuana card     Depakote 250 mg twice daily  Abilify 15 mg daily  Plan for Aristada 662 mg IM every 30 days first dose due 8/8/2022      Collateral information: Followed by social work  CD evaluation  Encourage patient to attend group and other milieu activities. Discharge planning discussed with the patient and treatment team.    PSYCHOTHERAPY/COUNSELING:  [x] Therapeutic interview  [x] Supportive  [] CBT  [] Ongoing  [] Other    [x] Patient continues to need, on a daily basis, active treatment furnished directly by or requiring the supervision of inpatient psychiatric personnel      Anticipated Length of stay: 5 to 7 days based on stability       NOTE: This report was transcribed using voice recognition software. Every effort was made to ensure accuracy; however, inadvertent computerized transcription errors may be present.     Electronically signed by Chanetta Heimlich, APRN - CNP on 8/8/2022 at 12:55 PM

## 2022-08-08 NOTE — GROUP NOTE
Group Therapy Note    Date: 8/8/2022    Group Start Time: 2081  Group End Time: 0310  Group Topic: Education Group - Inpatient    [facility]  [id number] [facility], [facility]                                                                        Group Therapy Note    Number of Participants: 4    Type of Group: Psychoeducation    Wellness Binder Information  Module Name:  success over stress    Patient's Goal:  Patient will be able to id physical symptoms to  stress and how one can manage daily stressors to lower stress. Notes:  Patient pleasant and engaged in group, willing to share when prompted. Status After Intervention:  Improved    Participation Level:  Active Listener    Participation Quality: Appropriate, Attentive, and Sharing      Speech:  normal      Thought Process/Content: Logical      Affective Functioning: Congruent      Mood: euthymic      Level of consciousness:  Alert, Oriented x4, and Attentive      Response to Learning: Able to verbalize/acknowledge new learning, Able to retain information, and Progressing to goal      Endings: None Reported    Modes of Intervention: Education, Support, Socialization, and Problem-solving      Discipline Responsible: Psychoeducational Specialist      Signature:  Luis Preston

## 2022-08-08 NOTE — PLAN OF CARE
patient/family's coping skills and  non-compliant behavior (including use of illegal substances)  2. Notify security of behavior or suspected illegal substances which indicate the need for search of the family and/or belongings  3. Encourage verbalization of thoughts and concerns in a socially appropriate manner  4. Utilize positive, consistent limit setting strategies supporting safety of patient, staff and others  5. Encourage participation in the decision making process about the behavioral management agreement  6. If a visitor's behavior poses a threat to safety call refer to organization policy. 7. Initiate consult with , Psychosocial CNS, Spiritual Care as appropriate  8/8/2022 0855 by Yasmin Chang RN  Outcome: Progressing     Problem: Depression/Self Harm  Goal: Effect of psychiatric condition will be minimized and patient will be protected from self harm  Description: INTERVENTIONS:  1. Assess impact of patient's symptoms on level of functioning, self care needs and offer support as indicated  2. Assess patient/family knowledge of depression, impact on illness and need for teaching  3. Provide emotional support, presence and reassurance  4. Assess for possible suicidal thoughts or ideation. If patient expresses suicidal thoughts or statements do not leave alone, initiate Suicide Precautions, move to a room close to the nursing station and obtain sitter  5. Initiate consults as appropriate with Mental Health Professional, Spiritual Care, Psychosocial CNS, and consider a recommendation to the [facility] for a Psychiatric Consultation  8/8/2022 0855 by Yasmin Chang RN  Outcome: Progressing    Patient denies suicidal ideations, homicidal ideations, and hallucinations. Patient endorses manageable feelings of anxiety. She is bright and social with peers. Future focused. Patient is medication compliant and is in control of her behavior.

## 2022-08-09 VITALS
TEMPERATURE: 98.1 F | DIASTOLIC BLOOD PRESSURE: 74 MMHG | OXYGEN SATURATION: 100 % | HEIGHT: 66 IN | WEIGHT: 155 LBS | BODY MASS INDEX: 24.91 KG/M2 | RESPIRATION RATE: 17 BRPM | HEART RATE: 105 BPM | SYSTOLIC BLOOD PRESSURE: 114 MMHG

## 2022-08-09 PROCEDURE — 99239 HOSP IP/OBS DSCHRG MGMT >30: CPT | Performed by: NURSE PRACTITIONER

## 2022-08-09 PROCEDURE — 6370000000 HC RX 637 (ALT 250 FOR IP): Performed by: NURSE PRACTITIONER

## 2022-08-09 RX ORDER — BENZTROPINE MESYLATE 1 MG/1
1 TABLET ORAL 2 TIMES DAILY
Qty: 60 TABLET | Refills: 0 | Status: SHIPPED | OUTPATIENT
Start: 2022-08-09 | End: 2022-09-08

## 2022-08-09 RX ORDER — DIVALPROEX SODIUM 250 MG/1
250 TABLET, DELAYED RELEASE ORAL EVERY 12 HOURS SCHEDULED
Qty: 60 TABLET | Refills: 0 | Status: SHIPPED | OUTPATIENT
Start: 2022-08-09 | End: 2022-09-08

## 2022-08-09 RX ORDER — ARIPIPRAZOLE 15 MG/1
15 TABLET ORAL DAILY
Qty: 30 TABLET | Refills: 0 | Status: SHIPPED | OUTPATIENT
Start: 2022-08-09 | End: 2022-09-08

## 2022-08-09 RX ORDER — LANOLIN ALCOHOL/MO/W.PET/CERES
3 CREAM (GRAM) TOPICAL NIGHTLY
Refills: 3 | COMMUNITY
Start: 2022-08-09

## 2022-08-09 RX ADMIN — NICOTINE POLACRILEX 2 MG: 2 GUM, CHEWING BUCCAL at 10:57

## 2022-08-09 RX ADMIN — ARIPIPRAZOLE 15 MG: 15 TABLET ORAL at 08:17

## 2022-08-09 RX ADMIN — DOCUSATE SODIUM 100 MG: 100 CAPSULE, LIQUID FILLED ORAL at 08:17

## 2022-08-09 RX ADMIN — DIVALPROEX SODIUM 250 MG: 250 TABLET, DELAYED RELEASE ORAL at 08:18

## 2022-08-09 RX ADMIN — BENZTROPINE MESYLATE 1 MG: 1 TABLET ORAL at 08:18

## 2022-08-09 ASSESSMENT — PAIN SCALES - GENERAL: PAINLEVEL_OUTOF10: 0

## 2022-08-09 NOTE — PROGRESS NOTES
Patient denies SI/HI/Hallucinations, anxiety or depression. Patient bright and pleasant during conversation, observed out on the unit socializing with peers and watching television. Patient reports that her medications are working well. Patient eager to return home today with her family. Patient taking prescribed medications, eating provided meals, and attending groups.

## 2022-08-09 NOTE — DISCHARGE SUMMARY
DISCHARGE SUMMARY      Patient ID:  Adonay Herrera  [id number]  74 y.o.  1995    Admit date: 8/1/2022    Discharge date and time: 8/9/2022    Admitting Physician: Caterina Alexander MD     Discharge Physician: Dr Sharonda Nunes MD    Discharge Diagnoses:   Patient Active Problem List   Diagnosis    Severe manic bipolar 1 disorder with psychotic behavior [facility])    Marijuana abuse       Admission Condition: poor    Discharged Condition: stable    Admission Circumstance:   Patient presented to the emergency department via [address] after her nurse practitioner called 911 after a telehealth visit. She was medically cleared in the emergency department she was provided Versed Geodon in the ED. he was found to be COVID-19 positive, UDS in ED positive for marijuana         PAST MEDICAL/PSYCHIATRIC HISTORY:   Past Medical History:   Diagnosis Date    ADHD (attention deficit hyperactivity disorder)     Bipolar 1 disorder (HCC)     Pneumonia     PTSD (post-traumatic stress disorder)        FAMILY/SOCIAL HISTORY:  History reviewed. No pertinent family history.   Social History     Socioeconomic History    Marital status: Single     Spouse name: Not on file    Number of children: 0    Years of education: 14    Highest education level: Not on file   Occupational History     Employer:    Tobacco Use    Smoking status: Former     Packs/day: 0.10     Types: Cigarettes    Smokeless tobacco: Never   Vaping Use    Vaping Use: Every day   Substance and Sexual Activity    Alcohol use: No     Comment: rare    Drug use: Yes     Types: Marijuana (Weed)     Comment: couple times per month    Sexual activity: Yes     Partners: Male   Other Topics Concern    Not on file   Social History Narrative    Not on file     Social Determinants of Health     Financial Resource Strain: Not on file   Food Insecurity: Not on file   Transportation Needs: Not on file   Physical Activity: Not on file   Stress: Not on file   Social Connections: Not on file   Intimate Partner Violence: Not on file   Housing Stability: Not on file       MEDICATIONS:    Current Facility-Administered Medications:     [START ON 9/8/2022] ARIPiprazole lauroxil (ARISTADA) injection 662 mg, 662 mg, IntraMUSCular, Q30 Days, Charmayne Sheer, APRN - CNP    ARIPiprazole (ABILIFY) tablet 15 mg, 15 mg, Oral, Daily, Charmayne Sheer, APRN - CNP, 15 mg at 08/08/22 6911    benztropine (COGENTIN) tablet 1 mg, 1 mg, Oral, BID, Charmayne Sheer, APRN - CNP, 1 mg at 08/08/22 2030    nicotine polacrilex (NICORETTE) gum 2 mg, 2 mg, Oral, Q2H PRN, Charmayne Sheer, APRN - CNP, 2 mg at 08/08/22 1244    docusate sodium (COLACE) capsule 100 mg, 100 mg, Oral, BID, Charmayne Sheer, APRN - CNP, 100 mg at 08/08/22 0824    ondansetron (ZOFRAN-ODT) disintegrating tablet 4 mg, 4 mg, Oral, Q8H PRN, Charmayne Sheer, APRN - CNP    divalproex (DEPAKOTE) DR tablet 250 mg, 250 mg, Oral, 2 times per day, Charmayne Sheer, APRN - CNP, 250 mg at 08/08/22 2030    ziprasidone (GEODON) injection 20 mg, 20 mg, IntraMUSCular, Once, Kaycee Modi DO    midazolam PF (VERSED) injection 2 mg, 2 mg, IntraMUSCular, Once, Kaycee Modi DO    diphenhydrAMINE (BENADRYL) injection 25 mg, 25 mg, IntraMUSCular, Once, Kaycee Modi DO    acetaminophen (TYLENOL) tablet 650 mg, 650 mg, Oral, Q6H PRN, Nuria Salazar MD, 650 mg at 08/02/22 2042    magnesium hydroxide (MILK OF MAGNESIA) 400 MG/5ML suspension 30 mL, 30 mL, Oral, Daily PRN, Nuria Salazar MD    aluminum & magnesium hydroxide-simethicone (MAALOX) 200-200-20 MG/5ML suspension 30 mL, 30 mL, Oral, PRN, Nuria Salazar MD, 30 mL at 08/04/22 0847    hydrOXYzine pamoate (VISTARIL) capsule 50 mg, 50 mg, Oral, TID PRN, Nuria Salazar MD, 50 mg at 08/08/22 1529    haloperidol (HALDOL) tablet 5 mg, 5 mg, Oral, Q6H PRN, 5 mg at 08/02/22 2043 **OR** haloperidol lactate (HALDOL) injection 5 mg, 5 mg, IntraMUSCular, Q6H PRN, Nuria Salazar MD    melatonin tablet 3 mg, 3 mg, Oral, Nightly, [facility], 3 mg at 08/08/22 2030    Examination:  /74   Pulse (!) 105   Temp 98.1 °F (36.7 °C) (Temporal)   Resp 17   Ht 5' 6\" (1.676 m)   Wt 155 lb (70.3 kg)   LMP 07/11/2022   SpO2 100%   BMI 25.02 kg/m²   Gait - steady    HOSPITAL COURSE[de-identified]  Following admission to the hospital, patient had a complete physical exam and blood work up, which she was medically cleared and admitted to [facility] for psychiatric evaluation and stabilization. The patient was monitored closely with suicide and appropriate precautions. She was started on Depakote 250 mg twice daily for mood stabilization, and Abilify which was optimized to 15 mg p.o. daily, she was provided the Aristada 662 mg IM monthly injection with next dose due on September 8, 2022. She was encouraged to participate in group and other milieu activity and started to feel better with this combination of treatment. There has been significant progress in the improvement of symptoms since admission. The patient has been an active participant in his treatment, and discharge planning. The patient was able to spontaneously describe with richness of detail their future plans and goals. The patient was no longer suicidal, homicidal, psychotic or manic. She received the required treatment with medication, participated in group milieu, remained engaged in unit activities and learn appropriate coping skills. She was seen to be watching television playing games and socializing with peers and using the phone. There were no mention or gestures of self-harm or harm to others. Her mental status returned to baseline. The treatment team believes patient has obtained maximum benefit out of this hospitalization and does not meet criteria for inpatient hospitalization anymore. However she will continue to benefit from outpatient follow-up and treatment to maintain stability.   Collateral information has been obtained and reconciled and there are no concerns about her safety. She has no access to guns or weapons. She appreciates the help that she received here. This patient no longer meets criteria for inpatient hospitalization. She was discharged home to her family in psychiatrically stable condition. Appetite:  [x] Normal  [] Increased  [] Decreased    Sleep:       [x] Normal  [] Fair       [] Poor            Energy:    [x] Normal  [] Increased  [] Decreased     SI [] Present  [x] Absent  HI  []Present  [x] Absent   Aggression:  [] yes  [] no  Patient is [x] able  [] unable to CONTRACT FOR SAFETY   Medication side effects(SE):  [x] None(Psych. Meds.) [] Other      Mental Status Examination on discharge:    Level of consciousness:  within normal limits   Appearance:  well-appearing  Behavior/Motor:  no abnormalities noted  Attitude toward examiner:  attentive and good eye contact  Speech:  spontaneous, normal rate and normal volume   Mood: euthymic  Affect:  mood congruent  Thought processes:  linear and goal directed   Thought content: The patient is devoid of suicidal or homicidal ideation intent or plan. Devoid of auditory or visual hallucinations or other perceptual disturbances, there are no overt or covert signs of psychosis or paranoia. There are no neurovegetative signs of depression.   Cognition:  oriented to person, place, and time   Concentration intact  Memory intact  Insight good   Judgement fair   Fund of Knowledge adequate      ASSESSMENT:  Patient symptoms are:  [x] Well controlled  [x] Improving  [] Worsening  [] No change    Reason for more than one antipsychotic:  [x] N/A  [] 3 Failed Monotherapy attempts (Drugs tried:)  [] Crossover to a new antipsychotic  [] Taper to Monotherapy from Polypharmacy  [] Augmentation of clozapine therapy due to treatment resistance to single therapy    Diagnosis:  Principal Problem:    Severe manic bipolar 1 disorder with psychotic behavior ([facility] [address]  Active Problems:    Marijuana abuse  Resolved Problems:    * No resolved hospital problems. *      LABS:    No results for input(s): WBC, HGB, PLT in the last 72 hours. No results for input(s): NA, K, CL, CO2, BUN, CREATININE, GLUCOSE in the last 72 hours. No results for input(s): BILITOT, ALKPHOS, AST, ALT in the last 72 hours. Lab Results   Component Value Date/Time    LABAMPH NOT DETECTED 08/01/2022 04:43 PM    BARBSCNU NOT DETECTED 08/01/2022 04:43 PM    LABBENZ NOT DETECTED 08/01/2022 04:43 PM    LABMETH NOT DETECTED 08/01/2022 04:43 PM    OPIATESCREENURINE NOT DETECTED 08/01/2022 04:43 PM    PHENCYCLIDINESCREENURINE NOT DETECTED 08/01/2022 04:43 PM    ETOH <10 08/01/2022 04:43 PM     Lab Results   Component Value Date/Time    TSH 1.790 07/17/2018 10:15 AM     No results found for: LITHIUM  Lab Results   Component Value Date    VALPROATE 81 08/05/2022       RISK ASSESSMENT AT DISCHARGE: Low risk for suicide and homicide. Treatment Plan:  The patient's diagnosis, treatment plan, medication management were formulated after patient was seen directly by the attending physician and myself and all relevant documentation was reviewed. Risk, benefit, side effects, possible outcomes of the medication and alternatives discussed with the patient and the patient demonstrated understanding. The patient was also educated that the outcome of treatment will depend on the medication compliance as directed by the prescribers along with regular follow-up, compliance with the labs and other work-up, as clinically indicated. The patient was referred to outpatient/inpatient substance abuse rehabilitation programming.   She was educated multiple times during the hospitalization that if she chooses to continue to use drugs or alcohol, she may act out impulsively, resulting in serious harm to self or others even though unintentional.  She was also educated that mental health treatment cannot be optimized with ongoing use of drugs or alcohol she demonstrated understanding and has the capacity to understand that. Patient states that she only occasionally smokes marijuana and does not believe this is a problem for her. Risks, benefits, side effects, drug-to-drug interactions and alternatives to treatment were discussed. Encourage patient to attend outpatient follow up appointment and therapy, outpatient follow-up appointments are scheduled prior to discharge. Patient was advised to call the outpatient provider, visit the nearest ED or call 911 if symptoms are not manageable. Patient's family member was contacted prior to the discharge, patient has been discharged home in psychiatrically stable condition. Medication List        START taking these medications      ARIPiprazole lauroxil 662 MG/2.4ML Prsy injection  Commonly known as: ARISTADA  Inject 2.4 mLs into the muscle every 30 days  Start taking on: September 8, 2022     benztropine 1 MG tablet  Commonly known as: COGENTIN  Take 1 tablet by mouth in the morning and 1 tablet before bedtime. divalproex 250 MG DR tablet  Commonly known as: DEPAKOTE  Take 1 tablet by mouth in the morning and 1 tablet before bedtime. melatonin 3 MG Tabs tablet  Take 1 tablet by mouth nightly            CHANGE how you take these medications      ARIPiprazole 15 MG tablet  Commonly known as: ABILIFY  Take 1 tablet by mouth in the morning.   What changed:   medication strength  how much to take  additional instructions     nicotine polacrilex 2 MG gum  Commonly known as: NICORETTE  Take 1 each by mouth every 2 hours as needed for Smoking cessation  What changed: when to take this            STOP taking these medications      buPROPion 300 MG extended release tablet  Commonly known as: WELLBUTRIN XL     sertraline 25 MG tablet  Commonly known as: ZOLOFT     Vyvanse 60 MG Caps  Generic drug: Lisdexamfetamine Dimesylate               Where to Get Your Medications        These medications were sent to [address]  -  [phone] Jeanette Moyer [phone]  [address] [facility] [address]      Phone: [phone]   ARIPiprazole 15 MG tablet  benztropine 1 MG tablet  divalproex 250 MG DR tablet       You can get these medications from any pharmacy    Bring a paper prescription for each of these medications  ARIPiprazole lauroxil 662 MG/2.4ML Prsy injection  You don't need a prescription for these medications  melatonin 3 MG Tabs tablet       Information about where to get these medications is not yet available    Ask your nurse or doctor about these medications  nicotine polacrilex 2 MG gum     NOTE: This report was transcribed using voice recognition software. Every effort was made to ensure accuracy; however, inadvertent computerized transcription errors may be present.       TIME SPEND - 35 MINUTES TO COMPLETE THE EVALUATION, DISCHARGE SUMMARY, MEDICATION RECONCILIATION AND FOLLOW UP CARE     Signed:  MUNDO Santana CNP  8/9/2022  8:16 AM

## 2022-08-09 NOTE — PLAN OF CARE
Problem: Anxiety  Goal: Will report anxiety at manageable levels  Description: INTERVENTIONS:  1. Administer medication as ordered  2. Teach and rehearse alternative coping skills  3. Provide emotional support with 1:1 interaction with staff  8/9/2022 0819 by Danica Tierney RN  Outcome: Progressing  8/8/2022 2050 by Lurena Schilder, RN  Outcome: Progressing  8/8/2022 2044 by Lurena Schilder, RN  Outcome: Progressing     Problem: Coping  Goal: Pt/Family able to verbalize concerns and demonstrate effective coping strategies  Description: INTERVENTIONS:  1. Assist patient/family to identify coping skills, available support systems and cultural and spiritual values  2. Provide emotional support, including active listening and acknowledgement of concerns of patient and caregivers  3. Reduce environmental stimuli, as able  4. Instruct patient/family in relaxation techniques, as appropriate  5. Assess for spiritual pain/suffering and initiate Spiritual Care, Psychosocial Clinical Specialist consults as needed  8/9/2022 2782 by Danica Tierney RN  Outcome: Progressing  8/8/2022 2050 by Lurena Schilder, RN  Outcome: Progressing  8/8/2022 2044 by Lurena Schilder, RN  Outcome: Progressing     Problem: Behavior  Goal: Pt/Family maintain appropriate behavior and adhere to behavioral management agreement, if implemented  Description: INTERVENTIONS:  1. Assess patient/family's coping skills and  non-compliant behavior (including use of illegal substances)  2. Notify security of behavior or suspected illegal substances which indicate the need for search of the family and/or belongings  3. Encourage verbalization of thoughts and concerns in a socially appropriate manner  4. Utilize positive, consistent limit setting strategies supporting safety of patient, staff and others  5. Encourage participation in the decision making process about the behavioral management agreement  6.  If a visitor's behavior poses a threat to safety call refer to organization policy. 7. Initiate consult with , Psychosocial CNS, Spiritual Care as appropriate  Outcome: Progressing     Problem: Involuntary Admit  Goal: Will cooperate with staff recommendations and doctor's orders and will demonstrate appropriate behavior  Description: INTERVENTIONS:  1. Treat underlying conditions and offer medication as ordered  2. Educate regarding involuntary admission procedures and rules  3.  Contain excessive/inappropriate behavior per unit and hospital policies  Outcome: Progressing     Problem: Pain  Goal: Verbalizes/displays adequate comfort level or baseline comfort level  Outcome: Progressing

## 2022-08-09 NOTE — GROUP NOTE
Group Therapy Note    Date: 8/9/2022    Group Start Time: 1100  Group End Time: 1130  Group Topic: Cognitive Skills    [facility]    Genny Claude, MSW, BERHANE        Group Therapy Note    Attendees: 2       Patient's Goal:  Pt will be able to verbalize understanding regarding the importance of building new habits. Notes:  Pt made connections and participated in group. Status After Intervention:  Improved    Participation Level:  Active Listener and Interactive    Participation Quality: Appropriate, Attentive, Sharing, and Supportive      Speech:  normal      Thought Process/Content: Logical  Linear      Affective Functioning: Congruent      Mood: depressed      Level of consciousness:  Alert, Oriented x4, and Attentive      Response to Learning: Able to verbalize current knowledge/experience      Endings: None Reported    Modes of Intervention: Education, Support, Socialization, and Exploration      Discipline Responsible: /Counselor      Signature:  Genny Claude, MSW, LSW

## 2022-08-09 NOTE — PLAN OF CARE
Patient active in the milieu and social with peers and staff member. Patient further educated on the current MERCHANT that was given earlier in day. Patient was calm and cooperative and displayed a bright affect. Patient denied SI, HI, AVH, Anxious and depressive symptoms, and pain at time of assessment. Patient was able to verbalize needs and exhibited no notable behaviors throughout the evening hours. Will continue to monitor patient per facility protocol as needed.

## 2022-08-09 NOTE — CARE COORDINATION
SISSY met with this pt to discuss discharge. Pt observed getting a drink in the dinning room. Pt states that she is ready to be discharged today, and is looking forward to being outside and with her dogs. Pt appears bright, pleasant, and cooperative while speaking with this . Pt's eye-contact is good. Pt appears linear and logical. Pt is currently denying SI/ HI/ hallucinations/ delusions. Pt states that her fiance, Ashely White, will provide transportation today due to him being off of work. Pt will return home where she resides with her fiance. Discharge plan solidified with Ashely Christopher. Pt will continue to follow up with [facility].      In order to ensure appropriate transition and discharge planning is in place, the following documents have been transmitted to [facility], as the new outpatient provider:    The d/c diagnosis was transmitted to the next care provider  The reason for hospitalization was transmitted to the next care provider  The d/c medications (dosage and indication) were transmitted to the next care provider   The continuing care plan was transmitted to the next care provider

## 2022-08-09 NOTE — PLAN OF CARE
Problem: Anxiety  Goal: Will report anxiety at manageable levels  Description: INTERVENTIONS:  1. Administer medication as ordered  2. Teach and rehearse alternative coping skills  3. Provide emotional support with 1:1 interaction with staff  8/8/2022 2050 by Jaqui Hughes RN  Outcome: Progressing  8/8/2022 2044 by Jaqui Hughes RN  Outcome: Progressing  8/8/2022 0855 by Rebekah Corley RN  Outcome: Progressing     Problem: Coping  Goal: Pt/Family able to verbalize concerns and demonstrate effective coping strategies  Description: INTERVENTIONS:  1. Assist patient/family to identify coping skills, available support systems and cultural and spiritual values  2. Provide emotional support, including active listening and acknowledgement of concerns of patient and caregivers  3. Reduce environmental stimuli, as able  4. Instruct patient/family in relaxation techniques, as appropriate  5. Assess for spiritual pain/suffering and initiate Spiritual Care, Psychosocial Clinical Specialist consults as needed  8/8/2022 2050 by Jaqui Hughes RN  Outcome: Progressing  8/8/2022 2044 by Jaqui Hughes RN  Outcome: Progressing  8/8/2022 0855 by Rebekah Corley RN  Outcome: Progressing     Problem: Decision Making  Goal: Pt/Family able to effectively weigh alternatives and participate in decision making related to treatment and care  Description: INTERVENTIONS:  1. Determine when there are differences between patient's view, family's view, and healthcare provider's view of condition  2. Facilitate patient and family articulation of goals for care  3. Help patient and family identify pros/cons of alternative solutions  4. Provide information as requested by patient/family  5. Respect patient/family right to receive or not to receive information  6. Serve as a liaison between patient and family and health care team  7.  Initiate Consults from Ethics, Palliative Care or initiate [facility] as is appropriate  8/8/2022 2050 by Adolfo Martínez, RN  Outcome: Progressing  8/8/2022 2044 by Adolfo Martínez RN  Outcome: Progressing  8/8/2022 0855 by Yasmin Chang RN  Outcome: Progressing

## 2022-08-09 NOTE — PROGRESS NOTES
[facility]  Discharge Note    Pt discharged with followings belongings:   Dental Appliances: None  Vision - Corrective Lenses: None  Hearing Aid: None  Jewelry: None  Body Piercings Removed: No  Clothing: Footwear, Socks, Pants, Undergarments  Other Valuables: At home   Valuables sent home withpatient or returned to patient. Patient education on aftercare instructions: Yes  at 11:17 AM .Patient verbalize understanding of AVS:  Yes. Status EXAM upon discharge:  Mental Status and Behavioral Exam  Normal: No  Level of Assistance: Independent/Self  Facial Expression: Brightened  Affect: Congruent  Level of Consciousness: Alert  Frequency of Checks: 4 times per hour, close  Mood:Normal: No  Mood: Anxious  Motor Activity:Normal: Yes  Motor Activity: Decreased  Eye Contact: Good  Observed Behavior: Cooperative, Friendly  Sexual Misconduct History: Current - no  Preception: [place] to person, [place] to time, [place] to place, [place] to situation  Attention:Normal: No  Attention: Distractible  Thought Processes: Other (comment) (Impaired, but improving)  Thought Content:Normal: Yes  Thought Content: Preoccupations  Depression Symptoms: No problems reported or observed. Anxiety Symptoms: Generalized  Elaine Symptoms: No problems reported or observed.   Hallucinations: None  Delusions: No  Memory:Normal: Yes  Memory: Poor recent  Insight and Judgment: Yes (Improving)  Insight and Judgment: Poor insight        Metabolic Screening:    Lab Results   Component Value Date    LABA1C 5.1 07/18/2018       Lab Results   Component Value Date    CHOL 126 07/18/2018     Lab Results   Component Value Date    TRIG 58 07/18/2018     Lab Results   Component Value Date    HDL 37 07/18/2018     No components found for: [facility] AND TREAT[facility] [place]  Lab Results   Component Value Date    LABVLDL 12 07/18/2018       Álvaro Contreras RN

## 2022-08-22 ENCOUNTER — HOSPITAL ENCOUNTER (EMERGENCY)
Age: 27
Discharge: HOME | End: 2022-08-22
Attending: EMERGENCY MEDICINE
Payer: COMMERCIAL

## 2022-08-22 VITALS
TEMPERATURE: 97.9 F | RESPIRATION RATE: 14 BRPM | DIASTOLIC BLOOD PRESSURE: 76 MMHG | OXYGEN SATURATION: 100 % | SYSTOLIC BLOOD PRESSURE: 118 MMHG | HEART RATE: 72 BPM

## 2022-08-22 DIAGNOSIS — R31.9 HEMATURIA, UNDIAGNOSED CAUSE: Primary | ICD-10-CM

## 2022-08-22 LAB
BACTERIA: ABNORMAL /HPF
BILIRUBIN URINE: NEGATIVE
BLOOD, URINE: ABNORMAL
CLARITY: CLEAR
COLOR: YELLOW
EPITHELIAL CELLS, UA: ABNORMAL /HPF
GLUCOSE URINE: NEGATIVE MG/DL
HCG(URINE) PREGNANCY TEST: NEGATIVE
KETONES, URINE: NEGATIVE MG/DL
LEUKOCYTE ESTERASE, URINE: NEGATIVE
NITRITE, URINE: NEGATIVE
PH UA: 7 (ref 5–9)
PROTEIN UA: NEGATIVE MG/DL
RBC UA: ABNORMAL /HPF (ref 0–2)
SPECIFIC GRAVITY UA: 1.01 (ref 1–1.03)
UROBILINOGEN, URINE: 0.2 E.U./DL
WBC UA: ABNORMAL /HPF (ref 0–5)

## 2022-08-22 PROCEDURE — 99283 EMERGENCY DEPT VISIT LOW MDM: CPT

## 2022-08-22 PROCEDURE — 81025 URINE PREGNANCY TEST: CPT

## 2022-08-22 PROCEDURE — 81001 URINALYSIS AUTO W/SCOPE: CPT

## 2022-08-22 NOTE — ED PROVIDER NOTES
Department of Emergency Medicine   ED  Provider Note  Admit Date/RoomTime: 8/22/2022  8:03 AM  ED Room: 12/12          History of Present Illness:  8/22/22, Time: 8:29 AM EDT  Chief Complaint   Patient presents with    Hematuria     Patient c/o hematuria post intercourse yesterday. Patient has had pressure during intercourse since IUD was changed in February. Ana Cristina Lopez is a 32 y.o. female presenting to the ED for hematuria, beginning 2 to 3 days. The complaint has been persistent, moderate in severity, and worsened by nothing. Patient presents for hematuria. She states after intercourse last night she has not some mild left side abdominal pain and she thinks she urinated some blood. She had a Paragaud IUD placed in February, was checked after placement, states she has had irregular periods since then but has been spotting for the last 3 weeks which she was not doing before. Denies fevers chills nausea vomiting or other abdominal pain. States she is moving her bowels regularly. Review of Systems:   Pertinent positives and negatives are stated within HPI, all other systems reviewed and are negative.        --------------------------------------------- PAST HISTORY ---------------------------------------------  Past Medical History:  has a past medical history of ADHD (attention deficit hyperactivity disorder), Bipolar 1 disorder ([facility] [address] Pneumonia, and PTSD (post-traumatic stress disorder). Past Surgical History:  has a past surgical history that includes Tympanostomy tube placement and [place] tooth extraction. Social History:  reports that she has quit smoking. Her smoking use included cigarettes. She smoked an average of .1 packs per day. She has never used smokeless tobacco. She reports current drug use. Drug: Marijuana Debbie Junior). She reports that she does not drink alcohol. Family History: family history is not on file. . Unless otherwise noted, family history is non contributory    The patients home medications have been reviewed. Allergies: Dust mite extract and Pollen extract        ---------------------------------------------------PHYSICAL EXAM--------------------------------------    Constitutional/General: Alert and oriented x3  Head: Normocephalic and atraumatic  Eyes: PERRL, EOMI, sclera non icteric  Mouth: Oropharynx clear, handling secretions, no trismus, no asymmetry of the posterior oropharynx or uvular edema  Neck: Supple, full ROM, no stridor, no meningeal signs  Respiratory: Lungs clear to auscultation bilaterally,Not in respiratory distress  Cardiovascular:  Regular rate. Regular rhythm. 2+ distal pulses. Equal extremity pulses. Chest: No chest wall tenderness  GI:  Abdomen Soft, Non tender, Non distended. No rebound, guarding, or rigidity. No CVA tenderness   : Patient declined  Musculoskeletal: Moves all extremities x 4. Warm and well perfused, no clubbing, cyanosis, or edema. Capillary refill <3 seconds  Integument: skin warm and dry. No rashes. Neurologic: GCS 15, no focal deficits, symmetric strength 5/5 in the upper and lower extremities bilaterally  Psychiatric: Normal Affect           -------------------------------------------------- RESULTS -------------------------------------------------  I have personally reviewed all laboratory and imaging results for this patient. Results are listed below.      LABS: (Lab results interpreted by me)  Results for orders placed or performed during the hospital encounter of 08/22/22   Urinalysis   Result Value Ref Range    Color, UA Yellow Straw/Yellow    Clarity, UA Clear Clear    Glucose, Ur Negative Negative mg/dL    Bilirubin Urine Negative Negative    Ketones, Urine Negative Negative mg/dL    Specific Gravity, UA 1.010 1.005 - 1.030    Blood, Urine MODERATE (A) Negative    pH, UA 7.0 5.0 - 9.0    Protein, UA Negative Negative mg/dL    Urobilinogen, Urine 0.2 <2.0 E.U./dL    Nitrite, Urine Negative Negative    Leukocyte Esterase, Urine Negative Negative   Pregnancy, Urine   Result Value Ref Range    HCG(Urine) Pregnancy Test NEGATIVE NEGATIVE   Microscopic Urinalysis   Result Value Ref Range    WBC, UA 0-1 0 - 5 /HPF    RBC, UA NONE 0 - 2 /HPF    Epithelial Cells, UA FEW /HPF    Bacteria, UA FEW (A) None Seen /HPF   ,       RADIOLOGY:  Interpreted by Radiologist unless otherwise specified  No orders to display                    ------------------------- NURSING NOTES AND VITALS REVIEWED ---------------------------   The nursing notes within the ED encounter and vital signs as below have been reviewed by myself  /79   Pulse 89   Temp 97.9 °F (36.6 °C)   Resp 16   LMP 07/31/2022 (Approximate)   SpO2 100%     Oxygen Saturation Interpretation: Normal       The patients available past medical records and past encounters were reviewed. ------------------------------ ED COURSE/MEDICAL DECISION MAKING----------------------  Medications - No data to display                 Medical Decision Making:     I, Dr. Alexandra Hernandez am the primary provider of record    Patient with hematuria, urinalysis does not show evidence of infection. Initially agreeable to recommended pelvic exam however she declined states she will follow-up with her OB and she has spoken with them and they can move her appointment up. Discussed need for follow-up she states understanding agreement         This patient's ED course included: a personal history and physicial examination, re-evaluation prior to disposition, and complex medical decision making and emergency management    This patient has remained hemodynamically stable during their ED course. Counseling: The emergency provider has spoken with the patient and discussed todays results, in addition to providing specific details for the plan of care and counseling regarding the diagnosis and prognosis.   Questions are answered at this time and they are agreeable with

## 2022-10-21 ENCOUNTER — APPOINTMENT (OUTPATIENT)
Dept: GENERAL RADIOLOGY | Age: 27
End: 2022-10-21
Payer: MEDICAID

## 2022-10-21 ENCOUNTER — HOSPITAL ENCOUNTER (EMERGENCY)
Age: 27
Discharge: HOME | End: 2022-10-21
Attending: EMERGENCY MEDICINE
Payer: MEDICAID

## 2022-10-21 VITALS
SYSTOLIC BLOOD PRESSURE: 118 MMHG | HEART RATE: 93 BPM | RESPIRATION RATE: 16 BRPM | DIASTOLIC BLOOD PRESSURE: 74 MMHG | TEMPERATURE: 97.8 F | OXYGEN SATURATION: 99 %

## 2022-10-21 DIAGNOSIS — S93.401A SPRAIN OF RIGHT ANKLE, UNSPECIFIED LIGAMENT, INITIAL ENCOUNTER: Primary | ICD-10-CM

## 2022-10-21 PROCEDURE — 99283 EMERGENCY DEPT VISIT LOW MDM: CPT

## 2022-10-21 PROCEDURE — 73610 X-RAY EXAM OF ANKLE: CPT

## 2022-10-21 RX ORDER — IBUPROFEN 600 MG/1
600 TABLET ORAL ONCE
Status: DISCONTINUED | OUTPATIENT
Start: 2022-10-21 | End: 2022-10-21 | Stop reason: HOSPADM

## 2022-10-21 ASSESSMENT — ENCOUNTER SYMPTOMS
COUGH: 0
PHOTOPHOBIA: 0
CHEST TIGHTNESS: 0
SORE THROAT: 0
DIARRHEA: 0
COLOR CHANGE: 0
SHORTNESS OF BREATH: 0
BACK PAIN: 0
ABDOMINAL DISTENTION: 0
CONSTIPATION: 0
RHINORRHEA: 0
EYE PAIN: 0
VOMITING: 0
BLOOD IN STOOL: 0
NAUSEA: 0
ABDOMINAL PAIN: 0

## 2022-10-21 ASSESSMENT — PAIN - FUNCTIONAL ASSESSMENT: PAIN_FUNCTIONAL_ASSESSMENT: 0-10

## 2022-10-21 ASSESSMENT — PAIN DESCRIPTION - LOCATION: LOCATION: ANKLE

## 2022-10-21 ASSESSMENT — PAIN SCALES - GENERAL: PAINLEVEL_OUTOF10: 6

## 2022-10-21 ASSESSMENT — PAIN DESCRIPTION - DESCRIPTORS: DESCRIPTORS: ACHING;DISCOMFORT;DULL

## 2022-10-21 ASSESSMENT — PAIN DESCRIPTION - ORIENTATION: ORIENTATION: RIGHT

## 2022-10-21 NOTE — ED PROVIDER NOTES
Name: Rodolfo Bernal    MRN: [id number]     Date / Time Roomed:  10/21/2022  7:18 AM  ED Bed Assignment:  03/03    ------------------ History of Present Illness --------------------  10/21/22, Time: 7:29 AM EDT   Chief Complaint   Patient presents with    Ankle Pain      HPI    Rodolfo Bernal is a 32 y.o. female, with hx of ADHD, who presents to the ED today for right ankle pain since yesterday. Patient states she was transporting her fish from 1 fish tank to the other and was trying to hurry and tripped, rolling her right ankle. She did feel a crunching sensation when this occurred. She has had pain in the right lateral side of the foot since this occurred. Pain is constant, moderate, sharp and achy, worse with ambulation, she did take some Tylenol Motrin last night which did seem to help a bit. She does have Ace wrap at home which she used last night as well. Denies any knee or hip pain. The pt denies other ROS at this time. PCP: No primary care provider on file. Jia Bahena -------------------- PMH --------------------  Past Medical History:  has a past medical history of ADHD (attention deficit hyperactivity disorder), Bipolar 1 disorder ([facility] [address] Pneumonia, and PTSD (post-traumatic stress disorder). Past Surgical History:  has a past surgical history that includes Tympanostomy tube placement and [place] tooth extraction. Social History:  reports that she has quit smoking. Her smoking use included cigarettes. She smoked an average of .1 packs per day. She has never used smokeless tobacco. She reports current drug use. Drug: Marijuana Jer Sofia). She reports that she does not drink alcohol. Family History: family history is not on file. Allergies: Dust mite extract and Pollen extract    The patients past medical history has been reviewed. -------------------- Current Meds --------------------  Meds: No current facility-administered medications for this encounter.     Current Outpatient Medications: divalproex (DEPAKOTE) 250 MG DR tablet, Take 1 tablet by mouth in the morning and 1 tablet before bedtime. , Disp: 60 tablet, Rfl: 0    benztropine (COGENTIN) 1 MG tablet, Take 1 tablet by mouth in the morning and 1 tablet before bedtime. , Disp: 60 tablet, Rfl: 0    ARIPiprazole (ABILIFY) 15 MG tablet, Take 1 tablet by mouth in the morning. (Patient not taking: Reported on 8/22/2022), Disp: 30 tablet, Rfl: 0    melatonin 3 MG TABS tablet, Take 1 tablet by mouth nightly, Disp: , Rfl: 3    nicotine polacrilex (NICORETTE) 2 MG gum, Take 1 each by mouth every 2 hours as needed for Smoking cessation, Disp: 360 each, Rfl: 0    ARIPiprazole lauroxil (ARISTADA) 662 MG/2.4ML PRSY injection, Inject 2.4 mLs into the muscle every 30 days, Disp: 2.4 mL, Rfl: 0     The patients home medications have been reviewed. -------------------- ROS --------------------  Review of Systems   Constitutional:  Negative for chills, fatigue and fever. HENT:  Negative for congestion, rhinorrhea and sore throat. Eyes:  Negative for photophobia, pain and visual disturbance. Respiratory:  Negative for cough, chest tightness and shortness of breath. Cardiovascular:  Negative for chest pain, palpitations and leg swelling. Gastrointestinal:  Negative for abdominal distention, abdominal pain, blood in stool, constipation, diarrhea, nausea and vomiting. Genitourinary:  Negative for difficulty urinating, dysuria, hematuria, vaginal bleeding and vaginal discharge. Musculoskeletal:  Positive for arthralgias (Right ankle). Negative for back pain, neck pain and neck stiffness. Skin:  Negative for color change, rash and wound. Neurological:  Negative for dizziness, syncope, light-headedness and headaches. Psychiatric/Behavioral:  Negative for agitation, behavioral problems and confusion.       -------------------- PE --------------------  Physical Exam  Constitutional:       General: She is not in acute distress.      Appearance: Normal appearance. She is normal weight. She is not ill-appearing, toxic-appearing or diaphoretic. HENT:      Head: Normocephalic and atraumatic. Right Ear: External ear normal.      Left Ear: External ear normal.      Nose: Nose normal. No rhinorrhea. Mouth/Throat:      Pharynx: Oropharynx is clear. Eyes:      General: No scleral icterus. Right eye: No discharge. Left eye: No discharge. Extraocular Movements: Extraocular movements intact. Conjunctiva/sclera: Conjunctivae normal.      Pupils: Pupils are equal, round, and reactive to light. Cardiovascular:      Rate and Rhythm: Normal rate and regular rhythm. Pulses: Normal pulses. Pulmonary:      Effort: Pulmonary effort is normal. No respiratory distress. Breath sounds: Normal breath sounds. No stridor. Abdominal:      General: Abdomen is flat. There is no distension. Palpations: Abdomen is soft. Tenderness: There is no abdominal tenderness. There is no guarding. Musculoskeletal:         General: Swelling and tenderness (Right lateral ankle along inferior portion of the lateral malleolous) present. Normal range of motion. Cervical back: Normal range of motion. Right lower leg: No edema. Left lower leg: No edema. Comments: Right foot is neurovascularly intact, pedal pulses intact, motor intact. No knee pain or deformity on evaluation. Right lateral malleoli mildly swollen, mild bruising. Skin:     General: Skin is warm and dry. Capillary Refill: Capillary refill takes less than 2 seconds. Coloration: Skin is not jaundiced. Findings: No erythema or rash. Neurological:      General: No focal deficit present. Mental Status: She is alert and oriented to person, place, and time.    Psychiatric:         Mood and Affect: Mood normal.         Behavior: Behavior normal.        -------------------- Procedures --------------------  Procedures     MDM  Number of Diagnoses or Management Options  Sprain of right ankle, unspecified ligament, initial encounter  Diagnosis management comments: Patient is a 70-year-old female who presents today for right ankle pain after tripping yesterday rolling her ankle. Patient alert, oriented, no distress. Vital stable. On exam, right lateral ankle mildly swollen, tender inferior to the malleoli, mild ecchymoses. No midfoot pain. No pain along the distal portions of the tibia and fibula. No knee pain on range of motion, swelling or tenderness. Right foot is neurovascularly intact, motor intact. Concern for ankle sprain, tendon injury, versus possible fracture. Ibuprofen given. XR negative for fractures. Likely ankle sprain   Ace-wrap applied and crutches given. Pt feeling a bit improved after treatment. At this time, no further workup was indicated. Spoke with pt about results, recommended symptomatic care at home, RICE therapy, f/u with pcp and return precautions. Pt remained stable throughout visit. Pt was d/c'd home.        -------------------- ED COURSE & MEDS GIVEN --------------------  Vitals:    10/21/22 0728   BP: 118/74   Pulse: 93   Resp: 16   Temp: 97.8 °F (36.6 °C)   SpO2: 99%        /74   Pulse 93   Temp 97.8 °F (36.6 °C) (Oral)   Resp 16   LMP 09/19/2022   SpO2 99%             Medications - No data to display      -------------------- RESULTS --------------------  Labs:  No results found for this visit on 10/21/22. Radiology:  XR ANKLE RIGHT (MIN 3 VIEWS)   Final Result   No acute abnormality of the ankle.             -------------------- NURSING NOTES & VITALS --------------------    The nursing notes within the ED encounter and vital signs were reviewed. No data found. Oxygen Saturation Interpretation: Normal    -------------------- PROGRESS NOTES --------------------  7:34 AM EDT  At this time, no further workup was felt necessary.  I have spoken with the patient and discussed todays results, in addition to providing specific details for the plan of care and counseling regarding the diagnosis and prognosis. Their questions are answered at this time. I discussed at length with them reasons for immediate return here for re evaluation. They will followup with their PCP by calling their office tomorrow and were instructed to return to the ED for any new or worsening symptoms. They are amenable to this plan.    -------------------- ADDITIONAL PROVIDER NOTES --------------------  At this time the patient is without objective evidence of an acute process requiring hospitalization or inpatient management. They have remained hemodynamically stable throughout their entire ED visit and are stable for discharge with outpatient follow-up. The plan has been discussed in detail and they are aware of the specific conditions for emergent return, as well as the importance of follow-up. Discharge Medication List as of 10/21/2022  8:00 AM          Diagnosis:  1. Sprain of right ankle, unspecified ligament, initial encounter        Disposition:  Patient's disposition: Discharge to home  Patient's condition is stable. Marylyn Ganser, DO       *NOTE: This report was transcribed using voice recognition software. Every effort was made to ensure accuracy; however, inadvertent computerized transcription errors may be present.        Marylyn Ganser, DO  Resident  10/21/22 0549

## 2022-10-21 NOTE — ED NOTES
Ice bag given for right ankle pain and swelling  Pt declines pain medication at this time     Jacek Bronson RN  10/21/22 5104

## 2022-10-21 NOTE — DISCHARGE INSTRUCTIONS
Please follow-up with your primary care doctor. Please return the ED for any new or worsening symptoms. If pain continues in 2 weeks, you may need additional x-ray of right ankle. Motrin or Tylenol for any discomfort. Please follow directions on box.

## 2023-07-15 ENCOUNTER — HOSPITAL ENCOUNTER (EMERGENCY)
Age: 28
Discharge: ANOTHER HOSPITAL | End: 2023-07-15
Attending: EMERGENCY MEDICINE
Payer: MEDICAID

## 2023-07-15 ENCOUNTER — HOSPITAL ENCOUNTER (INPATIENT)
Age: 28
LOS: 11 days | Discharge: HOME | DRG: 753 | End: 2023-07-26
Attending: PSYCHIATRY & NEUROLOGY | Admitting: PSYCHIATRY & NEUROLOGY
Payer: MEDICAID

## 2023-07-15 VITALS
HEART RATE: 75 BPM | HEIGHT: 66 IN | DIASTOLIC BLOOD PRESSURE: 75 MMHG | SYSTOLIC BLOOD PRESSURE: 118 MMHG | BODY MASS INDEX: 28.09 KG/M2 | RESPIRATION RATE: 16 BRPM | WEIGHT: 174.8 LBS | OXYGEN SATURATION: 99 % | TEMPERATURE: 97.8 F

## 2023-07-15 DIAGNOSIS — F30.10 MANIC BEHAVIOR (HCC): Primary | ICD-10-CM

## 2023-07-15 DIAGNOSIS — F12.10 CANNABIS ABUSE: ICD-10-CM

## 2023-07-15 DIAGNOSIS — F15.90 AMPHETAMINE USE: ICD-10-CM

## 2023-07-15 PROBLEM — F31.9 BIPOLAR DISORDER (HCC): Status: ACTIVE | Noted: 2023-07-15

## 2023-07-15 LAB
ALBUMIN SERPL-MCNC: 4.7 G/DL (ref 3.5–5.2)
ALP SERPL-CCNC: 79 U/L (ref 35–104)
ALT SERPL-CCNC: 10 U/L (ref 0–32)
AMPHET UR QL SCN: POSITIVE
ANION GAP SERPL CALCULATED.3IONS-SCNC: 12 MMOL/L (ref 7–16)
APAP SERPL-MCNC: <5 UG/ML (ref 10–30)
AST SERPL-CCNC: 15 U/L (ref 0–31)
BACTERIA URNS QL MICRO: ABNORMAL
BARBITURATES UR QL SCN: NEGATIVE
BASOPHILS # BLD: 0.05 K/UL (ref 0–0.2)
BASOPHILS NFR BLD: 1 % (ref 0–2)
BENZODIAZ UR QL: NEGATIVE
BILIRUB SERPL-MCNC: 0.8 MG/DL (ref 0–1.2)
BILIRUB UR QL STRIP: NEGATIVE
BUN SERPL-MCNC: 8 MG/DL (ref 6–20)
BUPRENORPHINE UR QL: NEGATIVE
CALCIUM SERPL-MCNC: 9.9 MG/DL (ref 8.6–10.2)
CANNABINOIDS UR QL SCN: POSITIVE
CHLORIDE SERPL-SCNC: 102 MMOL/L (ref 98–107)
CLARITY UR: ABNORMAL
CO2 SERPL-SCNC: 23 MMOL/L (ref 22–29)
COCAINE UR QL SCN: NEGATIVE
COLOR UR: YELLOW
CREAT SERPL-MCNC: 0.8 MG/DL (ref 0.5–1)
EKG ATRIAL RATE: 113 BPM
EKG P AXIS: 70 DEGREES
EKG P-R INTERVAL: 148 MS
EKG Q-T INTERVAL: 356 MS
EKG QRS DURATION: 88 MS
EKG QTC CALCULATION (BAZETT): 488 MS
EKG R AXIS: 82 DEGREES
EKG T AXIS: 48 DEGREES
EKG VENTRICULAR RATE: 113 BPM
EOSINOPHIL # BLD: 0.02 K/UL (ref 0.05–0.5)
EOSINOPHILS RELATIVE PERCENT: 0 % (ref 0–6)
ERYTHROCYTE [DISTWIDTH] IN BLOOD BY AUTOMATED COUNT: 11.9 % (ref 11.5–15)
ETHANOLAMINE SERPL-MCNC: <10 MG/DL
FENTANYL UR QL: NEGATIVE
GFR SERPL CREATININE-BSD FRML MDRD: >60 ML/MIN/1.73M2
GLUCOSE SERPL-MCNC: 102 MG/DL (ref 74–99)
GLUCOSE UR STRIP-MCNC: NEGATIVE MG/DL
HCG, URINE, POC: NEGATIVE
HCT VFR BLD AUTO: 41.4 % (ref 34–48)
HGB BLD-MCNC: 14 G/DL (ref 11.5–15.5)
HGB UR QL STRIP.AUTO: ABNORMAL
IMM GRANULOCYTES # BLD AUTO: <0.03 K/UL (ref 0–0.58)
IMM GRANULOCYTES NFR BLD: 0 % (ref 0–5)
KETONES UR STRIP-MCNC: 15 MG/DL
LEUKOCYTE ESTERASE UR QL STRIP: ABNORMAL
LYMPHOCYTES # BLD: 23 % (ref 20–42)
LYMPHOCYTES NFR BLD: 2.38 K/UL (ref 1.5–4)
Lab: NORMAL
MCH RBC QN AUTO: 28.5 PG (ref 26–35)
MCHC RBC AUTO-ENTMCNC: 33.8 G/DL (ref 32–34.5)
MCV RBC AUTO: 84.3 FL (ref 80–99.9)
METHADONE UR QL: NEGATIVE
MONOCYTES NFR BLD: 0.71 K/UL (ref 0.1–0.95)
MONOCYTES NFR BLD: 7 % (ref 2–12)
NEGATIVE QC PASS/FAIL: NORMAL
NEUTROPHILS NFR BLD: 70 % (ref 43–80)
NEUTS SEG NFR BLD: 7.29 K/UL (ref 1.8–7.3)
NITRITE UR QL STRIP: NEGATIVE
OPIATES UR QL SCN: NEGATIVE
OXYCODONE UR QL SCN: NEGATIVE
PCP UR QL SCN: NEGATIVE
PH UR STRIP: 6 [PH] (ref 5–9)
PLATELET # BLD AUTO: 414 K/UL (ref 130–450)
PMV BLD AUTO: 8.8 FL (ref 7–12)
POSITIVE QC PASS/FAIL: NORMAL
POTASSIUM SERPL-SCNC: 3.4 MMOL/L (ref 3.5–5)
PROT SERPL-MCNC: 7.8 G/DL (ref 6.4–8.3)
PROT UR STRIP-MCNC: ABNORMAL MG/DL
RBC # BLD AUTO: 4.91 M/UL (ref 3.5–5.5)
RBC #/AREA URNS HPF: ABNORMAL /HPF
SALICYLATES SERPL-MCNC: <0.3 MG/DL (ref 0–30)
SODIUM SERPL-SCNC: 137 MMOL/L (ref 132–146)
SP GR UR STRIP: 1.01 (ref 1–1.03)
TEST INFORMATION: ABNORMAL
TOXIC TRICYCLIC SC,BLOOD: NEGATIVE
TROPONIN I SERPL HS-MCNC: <6 NG/L (ref 0–9)
UROBILINOGEN UR STRIP-ACNC: 0.2 EU/DL (ref 0–1)
WBC #/AREA URNS HPF: ABNORMAL /HPF
WBC OTHER # BLD: 10.5 K/UL (ref 4.5–11.5)

## 2023-07-15 PROCEDURE — 99285 EMERGENCY DEPT VISIT HI MDM: CPT

## 2023-07-15 PROCEDURE — 80179 DRUG ASSAY SALICYLATE: CPT

## 2023-07-15 PROCEDURE — 93010 ELECTROCARDIOGRAM REPORT: CPT | Performed by: INTERNAL MEDICINE

## 2023-07-15 PROCEDURE — 81001 URINALYSIS AUTO W/SCOPE: CPT

## 2023-07-15 PROCEDURE — 93005 ELECTROCARDIOGRAM TRACING: CPT

## 2023-07-15 PROCEDURE — 6360000002 HC RX W HCPCS: Performed by: EMERGENCY MEDICINE

## 2023-07-15 PROCEDURE — 85027 COMPLETE CBC AUTOMATED: CPT

## 2023-07-15 PROCEDURE — 1240000000 HC EMOTIONAL WELLNESS R&B

## 2023-07-15 PROCEDURE — 80307 DRUG TEST PRSMV CHEM ANLYZR: CPT

## 2023-07-15 PROCEDURE — G0480 DRUG TEST DEF 1-7 CLASSES: HCPCS

## 2023-07-15 PROCEDURE — 2500000003 HC RX 250 WO HCPCS

## 2023-07-15 PROCEDURE — 84484 ASSAY OF TROPONIN QUANT: CPT

## 2023-07-15 PROCEDURE — 96372 THER/PROPH/DIAG INJ SC/IM: CPT

## 2023-07-15 PROCEDURE — 80053 COMPREHEN METABOLIC PANEL: CPT

## 2023-07-15 PROCEDURE — 80143 DRUG ASSAY ACETAMINOPHEN: CPT

## 2023-07-15 PROCEDURE — 2580000003 HC RX 258

## 2023-07-15 PROCEDURE — 6370000000 HC RX 637 (ALT 250 FOR IP): Performed by: PSYCHIATRY & NEUROLOGY

## 2023-07-15 RX ORDER — ACETAMINOPHEN 325 MG/1
650 TABLET ORAL EVERY 6 HOURS PRN
Status: DISCONTINUED | OUTPATIENT
Start: 2023-07-15 | End: 2023-07-26 | Stop reason: HOSPADM

## 2023-07-15 RX ORDER — HALOPERIDOL 5 MG/1
5 TABLET ORAL EVERY 6 HOURS PRN
Status: DISCONTINUED | OUTPATIENT
Start: 2023-07-15 | End: 2023-07-26 | Stop reason: HOSPADM

## 2023-07-15 RX ORDER — MIDAZOLAM HYDROCHLORIDE 2 MG/2ML
2 INJECTION, SOLUTION INTRAMUSCULAR; INTRAVENOUS ONCE
Status: COMPLETED | OUTPATIENT
Start: 2023-07-15 | End: 2023-07-15

## 2023-07-15 RX ORDER — POLYETHYLENE GLYCOL 3350 17 G
2 POWDER IN PACKET (EA) ORAL
Status: DISCONTINUED | OUTPATIENT
Start: 2023-07-15 | End: 2023-07-26 | Stop reason: HOSPADM

## 2023-07-15 RX ORDER — MAGNESIUM HYDROXIDE/ALUMINUM HYDROXICE/SIMETHICONE 120; 1200; 1200 MG/30ML; MG/30ML; MG/30ML
30 SUSPENSION ORAL PRN
Status: DISCONTINUED | OUTPATIENT
Start: 2023-07-15 | End: 2023-07-26 | Stop reason: HOSPADM

## 2023-07-15 RX ORDER — 0.9 % SODIUM CHLORIDE 0.9 %
1000 INTRAVENOUS SOLUTION INTRAVENOUS ONCE
Status: COMPLETED | OUTPATIENT
Start: 2023-07-15 | End: 2023-07-15

## 2023-07-15 RX ORDER — MIDAZOLAM HYDROCHLORIDE 2 MG/2ML
2 INJECTION, SOLUTION INTRAMUSCULAR; INTRAVENOUS ONCE
Status: DISCONTINUED | OUTPATIENT
Start: 2023-07-15 | End: 2023-07-15

## 2023-07-15 RX ORDER — LANOLIN ALCOHOL/MO/W.PET/CERES
3 CREAM (GRAM) TOPICAL NIGHTLY PRN
Status: DISCONTINUED | OUTPATIENT
Start: 2023-07-16 | End: 2023-07-22

## 2023-07-15 RX ORDER — HYDROXYZINE PAMOATE 50 MG/1
50 CAPSULE ORAL 3 TIMES DAILY PRN
Status: DISCONTINUED | OUTPATIENT
Start: 2023-07-15 | End: 2023-07-26 | Stop reason: HOSPADM

## 2023-07-15 RX ORDER — KETAMINE HYDROCHLORIDE 100 MG/ML
4 INJECTION INTRAMUSCULAR; INTRAVENOUS ONCE
Status: COMPLETED | OUTPATIENT
Start: 2023-07-15 | End: 2023-07-15

## 2023-07-15 RX ORDER — HALOPERIDOL 5 MG/ML
5 INJECTION INTRAMUSCULAR EVERY 6 HOURS PRN
Status: DISCONTINUED | OUTPATIENT
Start: 2023-07-15 | End: 2023-07-26 | Stop reason: HOSPADM

## 2023-07-15 RX ADMIN — KETAMINE HYDROCHLORIDE 320 MG: 100 INJECTION INTRAMUSCULAR; INTRAVENOUS at 09:07

## 2023-07-15 RX ADMIN — ACETAMINOPHEN 650 MG: 325 TABLET ORAL at 23:33

## 2023-07-15 RX ADMIN — MIDAZOLAM HYDROCHLORIDE 2 MG: 1 INJECTION, SOLUTION INTRAMUSCULAR; INTRAVENOUS at 08:56

## 2023-07-15 RX ADMIN — SODIUM CHLORIDE 1000 ML: 9 INJECTION, SOLUTION INTRAVENOUS at 10:21

## 2023-07-15 ASSESSMENT — PAIN - FUNCTIONAL ASSESSMENT
PAIN_FUNCTIONAL_ASSESSMENT: NONE - DENIES PAIN
PAIN_FUNCTIONAL_ASSESSMENT: NONE - DENIES PAIN
PAIN_FUNCTIONAL_ASSESSMENT: WONG-BAKER FACES
PAIN_FUNCTIONAL_ASSESSMENT: NONE - DENIES PAIN

## 2023-07-15 ASSESSMENT — PAIN DESCRIPTION - DESCRIPTORS: DESCRIPTORS: ACHING

## 2023-07-15 ASSESSMENT — LIFESTYLE VARIABLES
HOW MANY STANDARD DRINKS CONTAINING ALCOHOL DO YOU HAVE ON A TYPICAL DAY: 1 OR 2
HOW OFTEN DO YOU HAVE A DRINK CONTAINING ALCOHOL: NEVER
HOW OFTEN DO YOU HAVE A DRINK CONTAINING ALCOHOL: MONTHLY OR LESS
HOW MANY STANDARD DRINKS CONTAINING ALCOHOL DO YOU HAVE ON A TYPICAL DAY: 1 OR 2

## 2023-07-15 ASSESSMENT — PAIN DESCRIPTION - LOCATION
LOCATION: KNEE
LOCATION: KNEE

## 2023-07-15 ASSESSMENT — PAIN DESCRIPTION - ORIENTATION
ORIENTATION: LEFT
ORIENTATION: LEFT

## 2023-07-15 ASSESSMENT — PAIN SCALES - GENERAL
PAINLEVEL_OUTOF10: 7
PAINLEVEL_OUTOF10: 0
PAINLEVEL_OUTOF10: 5
PAINLEVEL_OUTOF10: 7

## 2023-07-15 NOTE — ED NOTES
Call to [facility] -[phone], spoke with assigned RN Nohelia Gonsales, requested Dr Taye Brand sign the pink slip with her full credentials as a DO.       [address] [facility],[address], [facility] [place], [place]  07/15/23 6312
PATIENT VISITING WITH FAMILY NO SIGN OF DISTRESS NOTED       Adam Catrer RN  07/15/23 0243
Patient medically cleared for evaluation by social work     Vj Durham,   07/15/23 7866
Patient pink slipped on 7/15/2023 @ 0755.      Yoli Tavera RN  07/15/23 1896
Patient resting in bed after ketamine shot given. Monitor, pulse ox and BP cuff on. Vitals stable, respirations non labored skin warm dry intact. IV inserted, urine collected. Fluids infusing. Sitter at bedside.  Will continue to monitor      Carolina Pastor RN  07/15/23 9368
Patient sitting on cot no sign of distress noted     Nii Restrepo RN  07/15/23 4424
Patient sitting on cot no sign of distress noted     Tegan Rice RN  07/15/23 4266
Patient sitting on cot talking to c/o no sign of destress noted     Apollo Mata RN  07/15/23 2000 Mare Leyva RN  07/15/23 5305
Patient visiting with family no sign of distress noted       Alec Fernández RN  07/15/23 2383
[facility]. Confirmed receipt with Simona Dickson.       Keisha Sanders RN  07/15/23 4376
Referred to [facility] nurse Baeza for review with on-call, possible admission Gokul Gongora.       [address] [facility],[address], [facility], [place]  07/15/23 1919
SW tele assessment complete, pt will be reviewed by psych on-call for admission [facility]. SW will notify ER of status.      [address] [facility],[address], [facility], [place]  07/15/23 0743
Spoke to Lino quarles NP. Patient accepted to [facility] under Dr. Dilshad Degroot, diagnosis bipolar disorder.  [address] notified.        Levi Sung RN  07/15/23 2000
Risk  [x] Low   [] Moderate   [] High     [x] Discussed current suicide risk, protective and risk factors with RN and ED Physician. Consulted with ED Physician.  Disposition/level of care recommended at this time:  [] Home:   [] Outpatient Provider:   [] Crisis Unit:   [x] Inpatient Psychiatric Unit: Due to current manic episode  [] Other:                    KATY Hsieh, BERHANE  07/15/23 41475 Philip Gaspar, MSMARTIN, [address] [facility], [place], [facility]  07/15/23 1916

## 2023-07-15 NOTE — ED PROVIDER NOTES
encounters were reviewed, see [facility] for details. ------------------------------ ED COURSE/MEDICAL DECISION MAKING----------------------  Medications   0.9 % sodium chloride bolus (0 mLs IntraVENous Stopped 7/15/23 1115)   midazolam PF (VERSED) injection 2 mg (2 mg IntraMUSCular Given 7/15/23 0856)   ketamine (KETALAR) injection 320 mg (320 mg IntraMUSCular Given 7/15/23 0907)            Medical Decision Making:     ED Course as of 07/15/23 1421   Sat Jul 15, 2023   1350 EKG: This EKG is signed and interpreted by the EP. Time: 9:19  Rate: 113  Rhythm: Sinus  Interpretation: sinus tachycardia  Comparison: was normal   [CF]      ED Course User Index  [CF] Quyen Gilmore, DO       Medical Decision Making  Hardeep Dorantes is a 32 y.o. female presenting to the ED for psych evaluation. Pt  at bedside reports pt is not sleeping, noting she only got 2 hours of sleep for the entire week, she is getting worked up and he is unable to calm her down. He states this happens every summer around this time and they end up needing to come to ED. He notes he has called the police previously and they have not been able to help him. Pt has gone to counselors in the past and was previously on medications for schizophrenic bipolar however she has not been taking any of them.  notes pt has been having auditory hallucinations however pt denying any AH/VH, SI/HI at this time. Given her presentation, concern for Ddx as listed below, therefore, workup was obtained. CBC completely unremarkable. Troponin less than 6 thus unlikely ACS. CMP unremarkable. Urinalysis revealing trace bacteria with only 0-5 white blood cells, trace leukocyte esterase and negative nitrates. Urine drug screen positive for cannabinoids and amphetamines. Serum drug screen still in process at this time. Patient did try to elope from emergency department after pink slip had been filed for patient.   She was found in the parking lot just outside of the

## 2023-07-16 PROBLEM — F31.9 BIPOLAR DISORDER (HCC): Status: RESOLVED | Noted: 2023-07-15 | Resolved: 2023-07-16

## 2023-07-16 PROCEDURE — 6370000000 HC RX 637 (ALT 250 FOR IP): Performed by: PSYCHIATRY & NEUROLOGY

## 2023-07-16 PROCEDURE — 1240000000 HC EMOTIONAL WELLNESS R&B

## 2023-07-16 RX ORDER — DIVALPROEX SODIUM 250 MG/1
250 TABLET, DELAYED RELEASE ORAL EVERY 12 HOURS SCHEDULED
Status: DISCONTINUED | OUTPATIENT
Start: 2023-07-16 | End: 2023-07-17

## 2023-07-16 RX ORDER — OXCARBAZEPINE 300 MG/1
300 TABLET, FILM COATED ORAL 2 TIMES DAILY
Status: DISCONTINUED | OUTPATIENT
Start: 2023-07-16 | End: 2023-07-20

## 2023-07-16 RX ORDER — LISDEXAMFETAMINE DIMESYLATE 60 MG/1
60 CAPSULE ORAL DAILY
Status: ON HOLD | COMMUNITY
End: 2023-07-26 | Stop reason: HOSPADM

## 2023-07-16 RX ORDER — OLANZAPINE 10 MG/1
10 TABLET ORAL NIGHTLY
Status: DISCONTINUED | OUTPATIENT
Start: 2023-07-16 | End: 2023-07-18

## 2023-07-16 RX ADMIN — OXCARBAZEPINE 300 MG: 300 TABLET, FILM COATED ORAL at 21:13

## 2023-07-16 RX ADMIN — ACETAMINOPHEN 650 MG: 325 TABLET ORAL at 10:51

## 2023-07-16 RX ADMIN — OXCARBAZEPINE 300 MG: 300 TABLET, FILM COATED ORAL at 12:20

## 2023-07-16 RX ADMIN — NICOTINE POLACRILEX 2 MG: 2 LOZENGE ORAL at 17:28

## 2023-07-16 RX ADMIN — HALOPERIDOL 5 MG: 5 TABLET ORAL at 03:50

## 2023-07-16 RX ADMIN — HYDROXYZINE PAMOATE 50 MG: 50 CAPSULE ORAL at 10:53

## 2023-07-16 RX ADMIN — OLANZAPINE 10 MG: 10 TABLET, FILM COATED ORAL at 21:13

## 2023-07-16 RX ADMIN — MELATONIN 3 MG ORAL TABLET 3 MG: 3 TABLET ORAL at 21:13

## 2023-07-16 RX ADMIN — HYDROXYZINE PAMOATE 50 MG: 50 CAPSULE ORAL at 02:37

## 2023-07-16 ASSESSMENT — PAIN SCALES - GENERAL
PAINLEVEL_OUTOF10: 0
PAINLEVEL_OUTOF10: 8

## 2023-07-16 ASSESSMENT — SLEEP AND FATIGUE QUESTIONNAIRES
DO YOU HAVE DIFFICULTY SLEEPING: YES
SLEEP PATTERN: RESTLESSNESS
AVERAGE NUMBER OF SLEEP HOURS: 5
DO YOU USE A SLEEP AID: NO
DO YOU HAVE DIFFICULTY SLEEPING: YES
DO YOU USE A SLEEP AID: NO
SLEEP PATTERN: RESTLESSNESS
AVERAGE NUMBER OF SLEEP HOURS: 8

## 2023-07-16 ASSESSMENT — LIFESTYLE VARIABLES
HOW MANY STANDARD DRINKS CONTAINING ALCOHOL DO YOU HAVE ON A TYPICAL DAY: 1 OR 2
HOW OFTEN DO YOU HAVE A DRINK CONTAINING ALCOHOL: MONTHLY OR LESS

## 2023-07-16 ASSESSMENT — PAIN DESCRIPTION - LOCATION: LOCATION: HEAD

## 2023-07-16 ASSESSMENT — PAIN DESCRIPTION - DESCRIPTORS: DESCRIPTORS: ACHING

## 2023-07-16 NOTE — BH NOTE
Patients parents showed up to the info desk. They brought in medical power of  papers. I spoke with the patient, she does not want them to know she is here. She states \"I will only talk to my fiance\". I informed her parents I cannot confirm or deny if the patient is here.

## 2023-07-16 NOTE — BH NOTE
Pt has a \"Declaration of Mental Health Treatment\" in her media. I discussed this with the patient, she states she wants it \"shredded\". Pt states she does not want either person listed to have any information.  Since this patient is alert and oriented at this time, patient's information will be kept confidential.

## 2023-07-16 NOTE — GROUP NOTE
Group Therapy Note    Date: 7/16/2023    Group Start Time: 1100  Group End Time: 1140  Group Topic: Cognitive Skills    [facility]    KATY Rosales, BERHANE        Group Therapy Note    Attendees: 7       Patient's Goal: to participate in group discussion on self-care. Notes:  pt was an active participant in group discussion. Status After Intervention:  Unchanged    Participation Level:  Active Listener and Interactive    Participation Quality: Attentive and Sharing      Speech:  normal      Thought Process/Content: Logical      Affective Functioning: Congruent      Mood: anxious and depressed      Level of consciousness:  Alert and Oriented x4      Response to Learning: Able to verbalize current knowledge/experience      Endings: None Reported    Modes of Intervention: Education, Support, Socialization, Exploration, Clarifying, and Problem-solving      Discipline Responsible: /Counselor      Signature:  KATY Scott, BERHANE

## 2023-07-16 NOTE — GROUP NOTE
Group Therapy Note    Date: 7/16/2023  Start Time: 6803  End Time:  1050  Number of Participants: 9    Type of Group: Psychoeducation    Name: Strengths Exploration    Patient's Goal: Identify at least 1 personal strength and at least 1 strength of someone inspirational.    Notes: CTRS educated patients on how strengths affect mental health. Patients identified personal strengths and strengths of inspirational others. Encouraged patients to share experiences of when they found strength. Status After Intervention:  Improved    Participation Level:  Active Listener and Interactive    Participation Quality: Appropriate, Attentive, and Sharing    Speech:  normal    Thought Process/Content: Logical  Linear    Affective Functioning: Congruent    Mood:  Appropriate    Level of consciousness:  Alert and Attentive    Response to Learning: Able to verbalize current knowledge/experience, Able to verbalize/acknowledge new learning, Able to retain information, and Capable of insight    Endings: None Reported    Modes of Intervention: Education    Discipline Responsible: Psychoeducational Specialist      Signature:  Eric Geronimo

## 2023-07-17 LAB
CHOLEST SERPL-MCNC: 103 MG/DL
HBA1C MFR BLD: 4.9 % (ref 4–5.6)
HDLC SERPL-MCNC: 36 MG/DL
LDLC SERPL CALC-MCNC: 52 MG/DL
TRIGL SERPL-MCNC: 77 MG/DL
VLDLC SERPL CALC-MCNC: 15 MG/DL

## 2023-07-17 PROCEDURE — 6370000000 HC RX 637 (ALT 250 FOR IP): Performed by: NURSE PRACTITIONER

## 2023-07-17 PROCEDURE — 6370000000 HC RX 637 (ALT 250 FOR IP): Performed by: PSYCHIATRY & NEUROLOGY

## 2023-07-17 PROCEDURE — 36415 COLL VENOUS BLD VENIPUNCTURE: CPT

## 2023-07-17 PROCEDURE — 99232 SBSQ HOSP IP/OBS MODERATE 35: CPT | Performed by: NURSE PRACTITIONER

## 2023-07-17 PROCEDURE — 83036 HEMOGLOBIN GLYCOSYLATED A1C: CPT

## 2023-07-17 PROCEDURE — 1240000000 HC EMOTIONAL WELLNESS R&B

## 2023-07-17 PROCEDURE — 80061 LIPID PANEL: CPT

## 2023-07-17 RX ORDER — IBUPROFEN 200 MG
TABLET ORAL 2 TIMES DAILY
Status: DISCONTINUED | OUTPATIENT
Start: 2023-07-17 | End: 2023-07-26 | Stop reason: HOSPADM

## 2023-07-17 RX ORDER — IBUPROFEN 400 MG/1
400 TABLET ORAL EVERY 6 HOURS PRN
Status: DISCONTINUED | OUTPATIENT
Start: 2023-07-17 | End: 2023-07-26 | Stop reason: HOSPADM

## 2023-07-17 RX ADMIN — NICOTINE POLACRILEX 2 MG: 2 LOZENGE ORAL at 16:13

## 2023-07-17 RX ADMIN — BACITRACIN ZINC, NEOMYCIN SULFATE, POLYMYXIN B SULFATE: 3.5; 5000; 4 OINTMENT TOPICAL at 09:44

## 2023-07-17 RX ADMIN — NICOTINE POLACRILEX 2 MG: 2 LOZENGE ORAL at 20:42

## 2023-07-17 RX ADMIN — OXCARBAZEPINE 300 MG: 300 TABLET, FILM COATED ORAL at 19:33

## 2023-07-17 RX ADMIN — OXCARBAZEPINE 300 MG: 300 TABLET, FILM COATED ORAL at 08:21

## 2023-07-17 RX ADMIN — NICOTINE POLACRILEX 2 MG: 2 LOZENGE ORAL at 18:54

## 2023-07-17 RX ADMIN — OLANZAPINE 10 MG: 10 TABLET, FILM COATED ORAL at 19:33

## 2023-07-17 RX ADMIN — NICOTINE POLACRILEX 2 MG: 2 LOZENGE ORAL at 11:09

## 2023-07-17 RX ADMIN — BACITRACIN ZINC, NEOMYCIN SULFATE, POLYMYXIN B SULFATE: 3.5; 5000; 4 OINTMENT TOPICAL at 19:33

## 2023-07-17 RX ADMIN — MELATONIN 3 MG ORAL TABLET 3 MG: 3 TABLET ORAL at 20:42

## 2023-07-17 RX ADMIN — NICOTINE POLACRILEX 2 MG: 2 LOZENGE ORAL at 13:26

## 2023-07-17 RX ADMIN — NICOTINE POLACRILEX 2 MG: 2 LOZENGE ORAL at 08:21

## 2023-07-17 ASSESSMENT — PAIN SCALES - GENERAL
PAINLEVEL_OUTOF10: 0
PAINLEVEL_OUTOF10: 0

## 2023-07-17 NOTE — GROUP NOTE
Group Therapy Note    Date: 7/17/2023    Group Start Time: 9939  Group End Time: 5622  Group Topic: Psychoeducation    [facility]    [place] Reyna Jesus                                                                        Group Therapy Note    Date: 7/17/2023    Wellness Binder Information  Module Name:  dimensions of wellness   Patient's Goal:  patient will be able to id what parts of his/her life are going well and what can be improved. Notes:  pleasant and sharing in group, able to participate appropriately, willing to take handout. Status After Intervention:  Improved    Participation Level:  Active Listener and Interactive    Participation Quality: Appropriate, Attentive, and Sharing      Speech:  normal      Thought Process/Content: Logical      Affective Functioning: Congruent      Mood: euthymic      Level of consciousness:  Alert, Oriented x4, and Attentive      Response to Learning: Able to verbalize/acknowledge new learning, Able to retain information, and Progressing to goal      Endings: None Reported    Modes of Intervention: Education, Support, Socialization, and Problem-solving      Discipline Responsible: Psychoeducational Specialist      Signature:  Rose Mary Warren

## 2023-07-18 PROCEDURE — 6370000000 HC RX 637 (ALT 250 FOR IP): Performed by: NURSE PRACTITIONER

## 2023-07-18 PROCEDURE — 1240000000 HC EMOTIONAL WELLNESS R&B

## 2023-07-18 PROCEDURE — 99232 SBSQ HOSP IP/OBS MODERATE 35: CPT | Performed by: NURSE PRACTITIONER

## 2023-07-18 PROCEDURE — 6370000000 HC RX 637 (ALT 250 FOR IP): Performed by: PSYCHIATRY & NEUROLOGY

## 2023-07-18 RX ORDER — OLANZAPINE 10 MG/1
10 TABLET ORAL NIGHTLY
Status: DISCONTINUED | OUTPATIENT
Start: 2023-07-18 | End: 2023-07-19

## 2023-07-18 RX ORDER — PALIPERIDONE 3 MG/1
3 TABLET, EXTENDED RELEASE ORAL 2 TIMES DAILY
Status: DISCONTINUED | OUTPATIENT
Start: 2023-07-18 | End: 2023-07-18

## 2023-07-18 RX ADMIN — NICOTINE POLACRILEX 2 MG: 2 LOZENGE ORAL at 09:08

## 2023-07-18 RX ADMIN — OXCARBAZEPINE 300 MG: 300 TABLET, FILM COATED ORAL at 21:03

## 2023-07-18 RX ADMIN — OLANZAPINE 10 MG: 10 TABLET, FILM COATED ORAL at 21:02

## 2023-07-18 RX ADMIN — NICOTINE POLACRILEX 2 MG: 2 LOZENGE ORAL at 12:37

## 2023-07-18 RX ADMIN — MELATONIN 3 MG ORAL TABLET 3 MG: 3 TABLET ORAL at 21:55

## 2023-07-18 RX ADMIN — NICOTINE POLACRILEX 2 MG: 2 LOZENGE ORAL at 17:52

## 2023-07-18 RX ADMIN — OXCARBAZEPINE 300 MG: 300 TABLET, FILM COATED ORAL at 08:49

## 2023-07-18 RX ADMIN — ACETAMINOPHEN 650 MG: 325 TABLET ORAL at 01:15

## 2023-07-18 RX ADMIN — BACITRACIN ZINC, NEOMYCIN SULFATE, POLYMYXIN B SULFATE: 3.5; 5000; 4 OINTMENT TOPICAL at 21:03

## 2023-07-18 RX ADMIN — NICOTINE POLACRILEX 2 MG: 2 LOZENGE ORAL at 19:57

## 2023-07-18 RX ADMIN — BACITRACIN ZINC, NEOMYCIN SULFATE, POLYMYXIN B SULFATE: 3.5; 5000; 4 OINTMENT TOPICAL at 08:48

## 2023-07-18 RX ADMIN — NICOTINE POLACRILEX 2 MG: 2 LOZENGE ORAL at 14:42

## 2023-07-18 ASSESSMENT — PAIN DESCRIPTION - DESCRIPTORS: DESCRIPTORS: ACHING

## 2023-07-18 ASSESSMENT — PAIN SCALES - GENERAL
PAINLEVEL_OUTOF10: 7
PAINLEVEL_OUTOF10: 0

## 2023-07-18 ASSESSMENT — PAIN DESCRIPTION - LOCATION: LOCATION: HEAD

## 2023-07-18 NOTE — BH NOTE
Pt denies suicidal / homicidal ideations. Pt denies hallucinations. Pt has some pressured speech. Pt has reported some constipation as she states that she only had a very small bowel movement. Pt denies abdominal discomforts. Pt was given milk of magnesia. Pt also expressed concern of her items in the assigned room being moved about but nothing has been seen missing. Staff advised pt that consideration will be given to pt possibly moving to a private room. No other distress.

## 2023-07-18 NOTE — GROUP NOTE
Group Therapy Note    Date: 7/18/2023    Group Start Time: 1125  Group End Time: 5222  Group Topic: Psychotherapy    [facility]    KATY Rosales LSW        Group Therapy Note    Attendees: 6       Patient's Goal:  To increase socialization and improve interpersonal relationships. Notes:  Pt was an active participant in group discussion. Status After Intervention:  Improved    Participation Level:  Active Listener and Interactive    Participation Quality: Appropriate, Attentive, Sharing, and Supportive      Speech:  normal      Thought Process/Content: Logical      Affective Functioning: Congruent      Mood: anxious      Level of consciousness:  Alert and Oriented x4      Response to Learning: Able to verbalize current knowledge/experience      Endings: None Reported    Modes of Intervention: Education, Support, Socialization, Exploration, Clarifying, and Problem-solving      Discipline Responsible: /Counselor      Signature:  KATY Goodrich LSW

## 2023-07-18 NOTE — GROUP NOTE
Group Therapy Note    Date: 7/18/2023    Group Start Time: 1010  Group End Time: 9541  Group Topic: Psychoeducation    [facility] [address], [place]                                                                        Group Therapy Note    Date: 7/18/2023  Module Name:  cognitive distortions     Patient's Goal:  patient will be able to id what a cognitive distortion is and how to combat them. Notes:  Pleasant and sharing in group, accepting of handout. Status After Intervention:  Improved    Participation Level:  Active Listener and Interactive    Participation Quality: Appropriate, Attentive, and Sharing      Speech:  normal      Thought Process/Content: Logical      Affective Functioning: Congruent      Mood: euthymic      Level of consciousness:  Alert, Oriented x4, and Attentive      Response to Learning: Able to verbalize/acknowledge new learning, Able to retain information, and Progressing to goal      Endings: None Reported    Modes of Intervention: Education, Support, Socialization, and Problem-solving      Discipline Responsible: Psychoeducational Specialist      Signature:  Genesis Diane

## 2023-07-18 NOTE — BH NOTE
Pt is stable and without immediates distress. Pt denies suicidal / homicidal ideations. Pt denies hallucinations. Pt is over all cooperative. Pt states she continues to feel constipated, advised she will likely take the milk of magnesium tomorrow morning. Pt is without other distress.

## 2023-07-19 PROCEDURE — 6370000000 HC RX 637 (ALT 250 FOR IP): Performed by: PSYCHIATRY & NEUROLOGY

## 2023-07-19 PROCEDURE — 99232 SBSQ HOSP IP/OBS MODERATE 35: CPT | Performed by: NURSE PRACTITIONER

## 2023-07-19 PROCEDURE — 6370000000 HC RX 637 (ALT 250 FOR IP): Performed by: NURSE PRACTITIONER

## 2023-07-19 PROCEDURE — 1240000000 HC EMOTIONAL WELLNESS R&B

## 2023-07-19 RX ADMIN — NICOTINE POLACRILEX 2 MG: 2 LOZENGE ORAL at 19:06

## 2023-07-19 RX ADMIN — NICOTINE POLACRILEX 2 MG: 2 LOZENGE ORAL at 13:17

## 2023-07-19 RX ADMIN — NICOTINE POLACRILEX 2 MG: 2 LOZENGE ORAL at 17:06

## 2023-07-19 RX ADMIN — OXCARBAZEPINE 300 MG: 300 TABLET, FILM COATED ORAL at 21:17

## 2023-07-19 RX ADMIN — OXCARBAZEPINE 300 MG: 300 TABLET, FILM COATED ORAL at 08:47

## 2023-07-19 RX ADMIN — NICOTINE POLACRILEX 2 MG: 2 LOZENGE ORAL at 09:29

## 2023-07-19 RX ADMIN — BACITRACIN ZINC, NEOMYCIN SULFATE, POLYMYXIN B SULFATE: 3.5; 5000; 4 OINTMENT TOPICAL at 21:17

## 2023-07-19 RX ADMIN — BACITRACIN ZINC, NEOMYCIN SULFATE, POLYMYXIN B SULFATE: 3.5; 5000; 4 OINTMENT TOPICAL at 08:47

## 2023-07-19 RX ADMIN — ACETAMINOPHEN 650 MG: 325 TABLET ORAL at 10:33

## 2023-07-19 RX ADMIN — MELATONIN 3 MG ORAL TABLET 3 MG: 3 TABLET ORAL at 21:18

## 2023-07-19 RX ADMIN — OLANZAPINE 15 MG: 5 TABLET, FILM COATED ORAL at 21:17

## 2023-07-19 RX ADMIN — HYDROXYZINE PAMOATE 50 MG: 50 CAPSULE ORAL at 02:10

## 2023-07-19 RX ADMIN — NICOTINE POLACRILEX 2 MG: 2 LOZENGE ORAL at 07:39

## 2023-07-19 ASSESSMENT — PAIN - FUNCTIONAL ASSESSMENT: PAIN_FUNCTIONAL_ASSESSMENT: ACTIVITIES ARE NOT PREVENTED

## 2023-07-19 ASSESSMENT — PAIN DESCRIPTION - ORIENTATION: ORIENTATION: LEFT

## 2023-07-19 ASSESSMENT — PAIN DESCRIPTION - DESCRIPTORS: DESCRIPTORS: ACHING;DISCOMFORT;SORE

## 2023-07-19 ASSESSMENT — PAIN DESCRIPTION - LOCATION: LOCATION: KNEE

## 2023-07-19 ASSESSMENT — PAIN SCALES - GENERAL
PAINLEVEL_OUTOF10: 0
PAINLEVEL_OUTOF10: 3

## 2023-07-19 NOTE — GROUP NOTE
Group Therapy Note    Date: 7/19/2023  Start Time: 4168  End Time:  1055  Number of Participants: 9    Type of Group: Psychoeducation    Wellness Binder Information  Module Name:  Take time for yourself    Patient's Goal:  ID one or two positive ways to improve self-care and work into pt daily routine    Notes:  CTRS discussed the importance of self-care and provided helpful tips to improve self-care. Patient actively engaged in discussion and was accepting of self-care and self-reflection handout. Status After Intervention:  Improved    Participation Level: Active Listener and Interactive, Inattentive at times    Participation Quality: Appropriate, Attentive, and Sharing      Speech:  normal      Thought Process/Content: Logical      Affective Functioning: Congruent      Mood: euthymic      Level of consciousness:  Alert and Attentive.   Patient needed some re-direction through verbal cues but       Response to Learning: Able to verbalize current knowledge/experience and Able to verbalize/acknowledge new learning      Endings: None Reported    Modes of Intervention: Education, Socialization, Exploration, and Clarifying      Discipline Responsible: Psychoeducational Specialist      Signature:  Reyna Thayer     Group Therapy Note    Attendees: 9

## 2023-07-19 NOTE — GROUP NOTE
Group Therapy Note    Date: 7/19/2023  Start Time: 1500  End Time:  1600  Number of Participants: 8    Type of Group: Recreational    Wellness Binder Information  Module Name: Canvas Art    Patient's Goal:  To demonstrate one positive coping mechanism through the use art. Increase socialization amongst peers    Notes:  Patient was provided with a canvas and paint and encouraged to paint canvas to their liking. Markers were utilized to write positive affirmations or mantras on canvas. Patient actively engaged in activity, and demonstrated knowledge of the benefits of art as a coping mechanism. Status After Intervention:  Improved    Participation Level:  Active Listener and Interactive    Participation Quality: Appropriate, Attentive, Sharing, and Supportive      Speech:  normal      Thought Process/Content: Logical      Affective Functioning: Congruent      Mood: euthymic      Level of consciousness:  Alert and Attentive      Response to Learning: Able to verbalize current knowledge/experience, Able to verbalize/acknowledge new learning, and Able to retain information      Endings: None Reported    Modes of Intervention: Education, Socialization, Activity, and Media      Discipline Responsible: Psychoeducational Specialist      Signature:  MARTI Oliveira     Group Therapy Note    Attendees: 8

## 2023-07-19 NOTE — GROUP NOTE
Group Therapy Note    Date: 7/19/2023    Group Start Time: 1100  Group End Time: 1150  Group Topic: Psychotherapy    [facility]    KATY Rosales LSW        Group Therapy Note    Attendees: 9       Patient's Goal:  To increase socialization and improve interpersonal relationships. Notes:  Pt was an active participant in group discussion. Status After Intervention:  Improved    Participation Level:  Active Listener and Interactive    Participation Quality: Appropriate, Attentive, Sharing, and Supportive      Speech:  normal      Thought Process/Content: Logical      Affective Functioning: Congruent      Mood: anxious      Level of consciousness:  Alert and Oriented x4      Response to Learning: Able to verbalize current knowledge/experience      Endings: None Reported    Modes of Intervention: Education, Support, and Socialization      Discipline Responsible: /Counselor      Signature:  KATY Stone LSW

## 2023-07-20 PROCEDURE — 6370000000 HC RX 637 (ALT 250 FOR IP): Performed by: PSYCHIATRY & NEUROLOGY

## 2023-07-20 PROCEDURE — 6370000000 HC RX 637 (ALT 250 FOR IP): Performed by: NURSE PRACTITIONER

## 2023-07-20 PROCEDURE — 1240000000 HC EMOTIONAL WELLNESS R&B

## 2023-07-20 PROCEDURE — 99232 SBSQ HOSP IP/OBS MODERATE 35: CPT | Performed by: NURSE PRACTITIONER

## 2023-07-20 RX ADMIN — OLANZAPINE 15 MG: 5 TABLET, FILM COATED ORAL at 21:34

## 2023-07-20 RX ADMIN — OXCARBAZEPINE 300 MG: 300 TABLET, FILM COATED ORAL at 09:19

## 2023-07-20 RX ADMIN — NICOTINE POLACRILEX 2 MG: 2 LOZENGE ORAL at 01:52

## 2023-07-20 RX ADMIN — HYDROXYZINE PAMOATE 50 MG: 50 CAPSULE ORAL at 01:24

## 2023-07-20 RX ADMIN — NICOTINE POLACRILEX 2 MG: 2 LOZENGE ORAL at 09:27

## 2023-07-20 RX ADMIN — NICOTINE POLACRILEX 2 MG: 2 LOZENGE ORAL at 18:04

## 2023-07-20 RX ADMIN — NICOTINE POLACRILEX 2 MG: 2 LOZENGE ORAL at 19:26

## 2023-07-20 RX ADMIN — BACITRACIN ZINC, NEOMYCIN SULFATE, POLYMYXIN B SULFATE: 3.5; 5000; 4 OINTMENT TOPICAL at 21:34

## 2023-07-20 RX ADMIN — ACETAMINOPHEN 650 MG: 325 TABLET ORAL at 05:28

## 2023-07-20 RX ADMIN — BACITRACIN ZINC, NEOMYCIN SULFATE, POLYMYXIN B SULFATE: 3.5; 5000; 4 OINTMENT TOPICAL at 09:19

## 2023-07-20 RX ADMIN — OXCARBAZEPINE 450 MG: 300 TABLET, FILM COATED ORAL at 21:33

## 2023-07-20 RX ADMIN — MELATONIN 3 MG ORAL TABLET 3 MG: 3 TABLET ORAL at 21:34

## 2023-07-20 RX ADMIN — NICOTINE POLACRILEX 2 MG: 2 LOZENGE ORAL at 12:39

## 2023-07-20 ASSESSMENT — PAIN DESCRIPTION - ORIENTATION: ORIENTATION: LEFT

## 2023-07-20 ASSESSMENT — PAIN SCALES - GENERAL
PAINLEVEL_OUTOF10: 5
PAINLEVEL_OUTOF10: 0

## 2023-07-20 ASSESSMENT — PAIN - FUNCTIONAL ASSESSMENT: PAIN_FUNCTIONAL_ASSESSMENT: ACTIVITIES ARE NOT PREVENTED

## 2023-07-20 ASSESSMENT — PAIN DESCRIPTION - DESCRIPTORS: DESCRIPTORS: DISCOMFORT

## 2023-07-20 NOTE — GROUP NOTE
Group Therapy Note    Date: 7/20/2023    Group Start Time: 1010  Group End Time: 4714  Group Topic: Psychoeducation    [facility]    Donna Dubon, Reyna Lowe                                                                        Group Therapy Note    Date: 7/20/2023    Type of Group: Psychoeducation    Wellness Binder Information  Module Name:  id of stressors     Patient's Goal:  Patient will be able to id physical symptoms to stress and daily/life events one is currently experiencing. Notes:  Pleasant and engaged in group, willing to share when prompted. Status After Intervention:  Improved    Participation Level:  Active Listener and Interactive    Participation Quality: Appropriate, Attentive, and Sharing      Speech:  normal      Thought Process/Content: Logical      Affective Functioning: Congruent      Mood: euthymic      Level of consciousness:  Alert, Oriented x4, and Attentive      Response to Learning: Able to verbalize/acknowledge new learning, Able to retain information, and Progressing to goal      Endings: None Reported    Modes of Intervention: Education, Support, Socialization, Exploration, and Problem-solving      Discipline Responsible: Psychoeducational Specialist      Signature:  Lilian Nichols

## 2023-07-20 NOTE — GROUP NOTE
Group Therapy Note    Date: 7/20/2023    Group Start Time: 1110  Group End Time: 1968  Group Topic: Psychotherapy    [facility]  [address], [facility]        Group Therapy Note    Attendees: 11       Patient's Goal:  To increase socialization and improve interpersonal relationships. Notes:  Pt was an active participant in group discussion. Status After Intervention:  Improved    Participation Level: Active Listener and Interactive    Participation Quality: Appropriate, Attentive, Sharing, and Supportive      Speech:  normal      Thought Process/Content: Logical  Linear      Affective Functioning: Congruent      Mood: anxious and euthymic      Level of consciousness:  Alert, Oriented x4, and Attentive      Response to Learning: Able to verbalize current knowledge/experience, Able to verbalize/acknowledge new learning, Able to retain information, Capable of insight, and Progressing to goal      Endings: None Reported    Modes of Intervention: Support, Socialization, and Exploration      Discipline Responsible: /Counselor      Signature:   KATY Gonzales, [place]

## 2023-07-21 PROCEDURE — 6370000000 HC RX 637 (ALT 250 FOR IP): Performed by: PSYCHIATRY & NEUROLOGY

## 2023-07-21 PROCEDURE — 1240000000 HC EMOTIONAL WELLNESS R&B

## 2023-07-21 PROCEDURE — 6370000000 HC RX 637 (ALT 250 FOR IP): Performed by: NURSE PRACTITIONER

## 2023-07-21 RX ORDER — OLANZAPINE 5 MG/1
5 TABLET ORAL DAILY
Status: DISCONTINUED | OUTPATIENT
Start: 2023-07-21 | End: 2023-07-26 | Stop reason: HOSPADM

## 2023-07-21 RX ADMIN — NICOTINE POLACRILEX 2 MG: 2 LOZENGE ORAL at 08:18

## 2023-07-21 RX ADMIN — OLANZAPINE 15 MG: 5 TABLET, FILM COATED ORAL at 23:08

## 2023-07-21 RX ADMIN — OXCARBAZEPINE 450 MG: 300 TABLET, FILM COATED ORAL at 23:07

## 2023-07-21 RX ADMIN — NICOTINE POLACRILEX 2 MG: 2 LOZENGE ORAL at 14:20

## 2023-07-21 RX ADMIN — ACETAMINOPHEN 650 MG: 325 TABLET ORAL at 01:42

## 2023-07-21 RX ADMIN — HYDROXYZINE PAMOATE 50 MG: 50 CAPSULE ORAL at 10:03

## 2023-07-21 RX ADMIN — OLANZAPINE 5 MG: 5 TABLET, FILM COATED ORAL at 10:03

## 2023-07-21 RX ADMIN — NICOTINE POLACRILEX 2 MG: 2 LOZENGE ORAL at 18:52

## 2023-07-21 RX ADMIN — NICOTINE POLACRILEX 2 MG: 2 LOZENGE ORAL at 12:55

## 2023-07-21 RX ADMIN — NICOTINE POLACRILEX 2 MG: 2 LOZENGE ORAL at 06:31

## 2023-07-21 RX ADMIN — NICOTINE POLACRILEX 2 MG: 2 LOZENGE ORAL at 23:38

## 2023-07-21 RX ADMIN — NICOTINE POLACRILEX 2 MG: 2 LOZENGE ORAL at 10:01

## 2023-07-21 RX ADMIN — HYDROXYZINE PAMOATE 50 MG: 50 CAPSULE ORAL at 01:42

## 2023-07-21 RX ADMIN — OXCARBAZEPINE 450 MG: 300 TABLET, FILM COATED ORAL at 08:28

## 2023-07-21 RX ADMIN — BACITRACIN ZINC, NEOMYCIN SULFATE, POLYMYXIN B SULFATE 1 G: 3.5; 5000; 4 OINTMENT TOPICAL at 08:28

## 2023-07-21 ASSESSMENT — PAIN SCALES - GENERAL
PAINLEVEL_OUTOF10: 7
PAINLEVEL_OUTOF10: 0
PAINLEVEL_OUTOF10: 0

## 2023-07-21 ASSESSMENT — PAIN DESCRIPTION - LOCATION: LOCATION: KNEE

## 2023-07-21 ASSESSMENT — PAIN DESCRIPTION - DESCRIPTORS: DESCRIPTORS: ACHING

## 2023-07-21 NOTE — GROUP NOTE
Group Therapy Note    Date: 7/21/2023    Group Start Time: 4441  Group End Time: 4355  Group Topic: Cognitive Skills    [facility]    KATY Diaz, [place]        Group Therapy Note    Attendees: 10         Patient's Goal:  Identifying unresolved emotions through the postcard activity    Notes:  pt was attentive and verbal during group, she was able to complete the activity    Status After Intervention:  Improved    Participation Level:  Active Listener and Interactive    Participation Quality: Appropriate, Attentive, Sharing, and Supportive      Speech:  normal      Thought Process/Content: Logical      Affective Functioning: Congruent      Mood: euthymic      Level of consciousness:  Alert, Oriented x4, and Attentive      Response to Learning: Able to verbalize current knowledge/experience, Able to verbalize/acknowledge new learning, and Able to retain information      Endings: None Reported    Modes of Intervention: Education, Support, Socialization, Exploration, Clarifying, and Problem-solving      Discipline Responsible: /Counselor      Signature:  KATY Diaz, [place]

## 2023-07-21 NOTE — GROUP NOTE
Group Therapy Note    Date: 7/21/2023    Group Start Time: 1100  Group End Time: 1140  Group Topic: Cognitive Skills    [facility]    KATY Chin, [facility]        Group Therapy Note    Attendees: 15       Patient's Goal:  To discuss ways to improve relationships and communication. Notes:  Pt was an active participant in group discussion. Status After Intervention:  Improved    Participation Level: Active Listener and Interactive    Participation Quality: Appropriate, Attentive, Sharing, Supportive, and Intrusive      Speech:  normal      Thought Process/Content: Logical  Linear      Affective Functioning: Congruent      Mood: elevated      Level of consciousness:  Alert, Oriented x4, and Attentive      Response to Learning: Able to verbalize current knowledge/experience, Able to verbalize/acknowledge new learning, Able to retain information, Capable of insight, and Progressing to goal      Endings: None Reported    Modes of Intervention: Education, Support, Socialization, Exploration, Clarifying, and Problem-solving      Discipline Responsible: /Counselor      Signature:   KATY Chin, [place]

## 2023-07-21 NOTE — BH NOTE
Tearful and sad that she is not being discharged today. Took newly scheduled Zyprexa as ordered at this time. Expressed concern that she becomes tired when taking Zyprexa during the day, and to \"please wake me up so I don't miss any groups. \"

## 2023-07-21 NOTE — GROUP NOTE
Group Therapy Note    Date: 7/21/2023    Group Start Time: 0900  Group End Time: 1000  Group Topic: Cognitive Skills    [facility]    KATY Kim LSW        Group Therapy Note    Attendees: 14       Patient's Goal:  Identification and exploration of relationships and boundaries    Notes:  pt was attentive in group and was able to identify ways to set boundaries     Status After Intervention:  Improved    Participation Level:  Active Listener but intrusive at times    Participation Quality: Attentive, and Sharing      Speech:  loud      Thought Process/Content: Logical      Affective Functioning: Congruent      Mood: labile      Level of consciousness:  Alert, Oriented x4, and Attentive      Response to Learning: Able to verbalize current knowledge/experience, Able to verbalize/acknowledge new learning, and Able to retain information      Endings: None Reported    Modes of Intervention: Education, Support, Socialization, Exploration, Clarifying, and Problem-solving      Discipline Responsible: /Counselor      Signature:  KATY Kim LSW

## 2023-07-22 PROCEDURE — 6370000000 HC RX 637 (ALT 250 FOR IP): Performed by: PSYCHIATRY & NEUROLOGY

## 2023-07-22 PROCEDURE — 1240000000 HC EMOTIONAL WELLNESS R&B

## 2023-07-22 PROCEDURE — 6370000000 HC RX 637 (ALT 250 FOR IP): Performed by: NURSE PRACTITIONER

## 2023-07-22 RX ORDER — LANOLIN ALCOHOL/MO/W.PET/CERES
6 CREAM (GRAM) TOPICAL NIGHTLY PRN
Status: DISCONTINUED | OUTPATIENT
Start: 2023-07-22 | End: 2023-07-26 | Stop reason: HOSPADM

## 2023-07-22 RX ORDER — OXCARBAZEPINE 300 MG/1
600 TABLET, FILM COATED ORAL 2 TIMES DAILY
Status: DISCONTINUED | OUTPATIENT
Start: 2023-07-22 | End: 2023-07-23

## 2023-07-22 RX ADMIN — NICOTINE POLACRILEX 2 MG: 2 LOZENGE ORAL at 20:57

## 2023-07-22 RX ADMIN — ACETAMINOPHEN 650 MG: 325 TABLET ORAL at 00:09

## 2023-07-22 RX ADMIN — NICOTINE POLACRILEX 2 MG: 2 LOZENGE ORAL at 11:51

## 2023-07-22 RX ADMIN — NICOTINE POLACRILEX 2 MG: 2 LOZENGE ORAL at 19:01

## 2023-07-22 RX ADMIN — OLANZAPINE 15 MG: 5 TABLET, FILM COATED ORAL at 20:53

## 2023-07-22 RX ADMIN — BACITRACIN ZINC, NEOMYCIN SULFATE, POLYMYXIN B SULFATE: 3.5; 5000; 4 OINTMENT TOPICAL at 20:53

## 2023-07-22 RX ADMIN — NICOTINE POLACRILEX 2 MG: 2 LOZENGE ORAL at 06:06

## 2023-07-22 RX ADMIN — NICOTINE POLACRILEX 2 MG: 2 LOZENGE ORAL at 15:00

## 2023-07-22 RX ADMIN — NICOTINE POLACRILEX 2 MG: 2 LOZENGE ORAL at 08:44

## 2023-07-22 RX ADMIN — MELATONIN 3 MG ORAL TABLET 6 MG: 3 TABLET ORAL at 20:53

## 2023-07-22 RX ADMIN — OXCARBAZEPINE 600 MG: 300 TABLET, FILM COATED ORAL at 20:54

## 2023-07-22 RX ADMIN — OLANZAPINE 5 MG: 5 TABLET, FILM COATED ORAL at 08:43

## 2023-07-22 RX ADMIN — NICOTINE POLACRILEX 2 MG: 2 LOZENGE ORAL at 16:40

## 2023-07-22 RX ADMIN — OXCARBAZEPINE 450 MG: 300 TABLET, FILM COATED ORAL at 08:43

## 2023-07-22 RX ADMIN — BACITRACIN ZINC, NEOMYCIN SULFATE, POLYMYXIN B SULFATE: 3.5; 5000; 4 OINTMENT TOPICAL at 08:43

## 2023-07-22 ASSESSMENT — PAIN SCALES - GENERAL
PAINLEVEL_OUTOF10: 0
PAINLEVEL_OUTOF10: 4

## 2023-07-22 ASSESSMENT — PAIN DESCRIPTION - LOCATION: LOCATION: KNEE

## 2023-07-22 ASSESSMENT — PAIN DESCRIPTION - DESCRIPTORS: DESCRIPTORS: ACHING

## 2023-07-22 ASSESSMENT — PAIN DESCRIPTION - ORIENTATION: ORIENTATION: LEFT

## 2023-07-22 ASSESSMENT — PAIN - FUNCTIONAL ASSESSMENT: PAIN_FUNCTIONAL_ASSESSMENT: ACTIVITIES ARE NOT PREVENTED

## 2023-07-22 NOTE — GROUP NOTE
Group Therapy Note    Date: 7/22/2023    Group Start Time: 1100  Group End Time: 9741  Group Topic: Cognitive Skills    [facility]  Elm Aurora, MSW, LSW        Group Therapy Note    Attendees: 17       Patient's Goal:  pt will be able to identify values in themselves and things that they like about their selves. Pt will be able to discuss how other people see them. Notes:  Pt participated in group and made connections. Status After Intervention:  Improved    Participation Level:  Active Listener and Interactive    Participation Quality: Appropriate, Attentive, Sharing, and Supportive      Speech:  normal      Thought Process/Content: Logical  Linear      Affective Functioning: Congruent      Mood: anxious      Level of consciousness:  Alert, Oriented x4, and Attentive      Response to Learning: Able to verbalize current knowledge/experience, Able to verbalize/acknowledge new learning, Able to retain information, and Capable of insight      Endings: None Reported    Modes of Intervention: Education, Support, Socialization, Exploration, Clarifying, and Problem-solving      Discipline Responsible: /Counselor      Signature:  KATY Black, [place]

## 2023-07-22 NOTE — GROUP NOTE
Group Therapy Note    Date: 7/22/2023    Group Start Time: 1500  Group End Time: 1600  Group Topic: Psychoeducation    [facility]    Jacquelynn Oppenheim, [place]                                                                        Group Therapy Note    Date: 7/22/2023  Start Time: 1500  End Time:  1600  Number of Participants: 16    Type of Group: Psychoeducation    Wellness Binder Information  Module Name:  Ask it Basket  Patient's Goal: To increase socialization amongst peers. Encourage decision making skills    Notes:  CTRS provided each patient with a piece of paper and asked patient to write down either a current stressor or a question. CTRS then gathered each paper and read them off. Patients were encouraged to provide suggestions to their peers. Patient was active in discussion. Status After Intervention:  Improved    Participation Level:  Active Listener and Interactive    Participation Quality: Appropriate, Attentive, and Sharing      Speech:  normal      Thought Process/Content: Logical      Affective Functioning: Congruent      Mood: euthymic      Level of consciousness:  Alert and Attentive      Response to Learning: Able to verbalize current knowledge/experience and Able to verbalize/acknowledge new learning      Endings: None Reported    Modes of Intervention: Education      Discipline Responsible: Psychoeducational Specialist      Signature:  Jacquelynn Oppenheim, CTRS     Group Therapy Note    Attendees: 16
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
## 2023-07-22 NOTE — BH NOTE
Patient has been out on the unit and social with peers. She did take a nap in the afternoon but she did attend all groups. She was med compliant. We discussed her medication changes including melatonin and Trileptal doses. She verbalizes she is agreeable to those changes. She did get agitated toward a staff member and stated \"if you have something to say to me then just say it\" seemingly misinterpreting the staff member's statements. She reports her anxiety is less this afternoon and rates it 1/10. She denies depression. She denies SI/HI/AVH. She has been less labile this afternoon. Encouraged continued appropriate milieu participation. Will continue to monitor.

## 2023-07-23 PROCEDURE — 6370000000 HC RX 637 (ALT 250 FOR IP): Performed by: PSYCHIATRY & NEUROLOGY

## 2023-07-23 PROCEDURE — 1240000000 HC EMOTIONAL WELLNESS R&B

## 2023-07-23 PROCEDURE — 6370000000 HC RX 637 (ALT 250 FOR IP): Performed by: NURSE PRACTITIONER

## 2023-07-23 RX ORDER — OXCARBAZEPINE 150 MG/1
450 TABLET, FILM COATED ORAL 2 TIMES DAILY
Status: DISCONTINUED | OUTPATIENT
Start: 2023-07-23 | End: 2023-07-26 | Stop reason: HOSPADM

## 2023-07-23 RX ORDER — OXCARBAZEPINE 300 MG/1
600 TABLET, FILM COATED ORAL NIGHTLY
Status: DISCONTINUED | OUTPATIENT
Start: 2023-07-23 | End: 2023-07-23

## 2023-07-23 RX ORDER — OXCARBAZEPINE 150 MG/1
450 TABLET, FILM COATED ORAL DAILY
Status: DISCONTINUED | OUTPATIENT
Start: 2023-07-24 | End: 2023-07-23

## 2023-07-23 RX ORDER — OLANZAPINE 10 MG/1
10 TABLET ORAL NIGHTLY
Status: DISCONTINUED | OUTPATIENT
Start: 2023-07-23 | End: 2023-07-26 | Stop reason: HOSPADM

## 2023-07-23 RX ADMIN — OLANZAPINE 10 MG: 10 TABLET, FILM COATED ORAL at 20:57

## 2023-07-23 RX ADMIN — NICOTINE POLACRILEX 2 MG: 2 LOZENGE ORAL at 17:58

## 2023-07-23 RX ADMIN — MELATONIN 3 MG ORAL TABLET 6 MG: 3 TABLET ORAL at 20:57

## 2023-07-23 RX ADMIN — NICOTINE POLACRILEX 2 MG: 2 LOZENGE ORAL at 08:42

## 2023-07-23 RX ADMIN — OXCARBAZEPINE 600 MG: 300 TABLET, FILM COATED ORAL at 08:42

## 2023-07-23 RX ADMIN — NICOTINE POLACRILEX 2 MG: 2 LOZENGE ORAL at 12:45

## 2023-07-23 RX ADMIN — NICOTINE POLACRILEX 2 MG: 2 LOZENGE ORAL at 06:28

## 2023-07-23 RX ADMIN — NICOTINE POLACRILEX 2 MG: 2 LOZENGE ORAL at 10:13

## 2023-07-23 RX ADMIN — HYDROXYZINE PAMOATE 50 MG: 50 CAPSULE ORAL at 02:37

## 2023-07-23 RX ADMIN — OLANZAPINE 5 MG: 5 TABLET, FILM COATED ORAL at 08:42

## 2023-07-23 RX ADMIN — NICOTINE POLACRILEX 2 MG: 2 LOZENGE ORAL at 20:33

## 2023-07-23 RX ADMIN — BACITRACIN ZINC, NEOMYCIN SULFATE, POLYMYXIN B SULFATE: 3.5; 5000; 4 OINTMENT TOPICAL at 08:42

## 2023-07-23 RX ADMIN — MAGNESIUM HYDROXIDE 30 ML: 400 SUSPENSION ORAL at 05:41

## 2023-07-23 RX ADMIN — OXCARBAZEPINE 450 MG: 300 TABLET, FILM COATED ORAL at 20:57

## 2023-07-23 RX ADMIN — ACETAMINOPHEN 650 MG: 325 TABLET ORAL at 05:51

## 2023-07-23 RX ADMIN — NICOTINE POLACRILEX 2 MG: 2 LOZENGE ORAL at 14:38

## 2023-07-23 ASSESSMENT — PAIN - FUNCTIONAL ASSESSMENT: PAIN_FUNCTIONAL_ASSESSMENT: ACTIVITIES ARE NOT PREVENTED

## 2023-07-23 ASSESSMENT — PAIN DESCRIPTION - LOCATION: LOCATION: KNEE

## 2023-07-23 ASSESSMENT — PAIN DESCRIPTION - DESCRIPTORS: DESCRIPTORS: ACHING;TENDER;TEARING

## 2023-07-23 ASSESSMENT — PAIN SCALES - GENERAL
PAINLEVEL_OUTOF10: 5
PAINLEVEL_OUTOF10: 0

## 2023-07-23 ASSESSMENT — PAIN DESCRIPTION - ORIENTATION: ORIENTATION: LEFT

## 2023-07-23 NOTE — GROUP NOTE
Group Therapy Note    Date: 7/23/2023    Group Start Time: 1100  Group End Time: 1150  Group Topic: Cognitive Skills    [facility]  Elm [address], MSW, LSW        Group Therapy Note    Attendees: 18       Patient's Goal:  Pt will be able to identify current things that they can do for self care and new things that they would like to try. Pt will be able to identify a good time in the day to practice self care. Notes:  Pt participated in group and made connections. Status After Intervention:  Improved    Participation Level:  Active Listener and Interactive    Participation Quality: Appropriate, Attentive, Sharing, and Supportive      Speech:  normal      Thought Process/Content: Logical  Linear      Affective Functioning: Congruent      Mood: anxious      Level of consciousness:  Alert, Oriented x4, and Attentive      Response to Learning: Able to verbalize current knowledge/experience, Able to verbalize/acknowledge new learning, Able to retain information, and Capable of insight      Endings: None Reported    Modes of Intervention: Education, Support, Socialization, Exploration, Clarifying, and Problem-solving      Discipline Responsible: /Counselor

## 2023-07-23 NOTE — BH NOTE
Patient is awake, alert, oriented x4. She has a bright affect and mild circumstantiality. She reports she slept well last night, over 6 hours and was up around 5:30am which she says is normal for her given her work schedule prior to coming in here. 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. She was very focused on working and making sure her Zyprexa wouldn't be increased during the day because it would make her too tired to work. She reports she is planning on taking 1-2 months off after discharge to get established with a psychiatrist ans therapist and then apply for a nurses aide jobs. She also reports she would like to attend nursing school but isn't sure how she would do that. 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 She reports anxiety is 5/10, denies depression. She denies SI/HI/AVH. Encouraged group and appropriate milieu participation. Will continue to monitor.

## 2023-07-23 NOTE — BH NOTE
Patient came was observed by staff as talking but \"not making any sense at all. \" Patient came to the nurses station at [phone] and was talking word salad with no logical connection in words. He pupils were pinpoint and her eyes were gazed downward without blinking. She was oriented to place. She was able to follow commands and walked to her room and washed her face which did not help bring her out of the altered state. She was then encouraged to walk around the unit several times and her vitals were assessed by another nurse and the patient returned to this nurse logical and coherent in her statements. She does have memory of the sleep-wake transition and states \"I got out of bed and was slurring my words. \" She recalls being told to splash her face with water and she remembers being told to walk around the unit \"and then someone stopped me and I snapped out of it. \"     She has been tired throughout the day from the increased Trileptal dose. She reports she has been told by her former roommate here that she was sleepwalking one night but she doesn't remember this. She states last night she woke up and was having a similar issue where she was slurring her words and unable to wake up, and she was given Vistaril and told to go back to bed. She was already awake when this RN came on the unit this shift so this was not noted until this afternoon. The patient is now awake, alert, in group participating with group activity and being appropriately social.     Torie Gregorio NP notified and changed PM med orders. Will continue to monitor.

## 2023-07-23 NOTE — GROUP NOTE
Group Therapy Note    Date: 7/23/2023  Start Time: 1400  End Time:  0679  Number of Participants: 14    Type of Group: Psychoeducation    Wellness Binder Information  Module Name:  Chair Yoga and Meditation    Patient's Goal:  To engage in one new relaxation technique and ID one way to engage in meditation    Notes:  Patient actively engaged in [facility] DreaKilldeer led chair yoga and was provided a handout on poses. Patient actively engaged in relaxation mad libs. Status After Intervention:  Improved    Participation Level:  Active Listener and Interactive    Participation Quality: Appropriate, Attentive, and Sharing      Speech:  normal      Thought Process/Content: Logical      Affective Functioning: Congruent      Mood: euthymic      Level of consciousness:  Alert and Attentive      Response to Learning: Able to verbalize current knowledge/experience and Able to verbalize/acknowledge new learning      Endings: None Reported    Modes of Intervention: Education, Socialization, and Movement      Discipline Responsible: Psychoeducational Specialist      Signature:  MARTI Reed     Group Therapy Note    Attendees: 15

## 2023-07-24 PROCEDURE — 6370000000 HC RX 637 (ALT 250 FOR IP): Performed by: NURSE PRACTITIONER

## 2023-07-24 PROCEDURE — 1240000000 HC EMOTIONAL WELLNESS R&B

## 2023-07-24 PROCEDURE — 6370000000 HC RX 637 (ALT 250 FOR IP): Performed by: PSYCHIATRY & NEUROLOGY

## 2023-07-24 RX ADMIN — OXCARBAZEPINE 450 MG: 300 TABLET, FILM COATED ORAL at 21:03

## 2023-07-24 RX ADMIN — ACETAMINOPHEN 650 MG: 325 TABLET ORAL at 21:31

## 2023-07-24 RX ADMIN — NICOTINE POLACRILEX 2 MG: 2 LOZENGE ORAL at 06:09

## 2023-07-24 RX ADMIN — NICOTINE POLACRILEX 2 MG: 2 LOZENGE ORAL at 14:58

## 2023-07-24 RX ADMIN — MELATONIN 3 MG ORAL TABLET 6 MG: 3 TABLET ORAL at 21:04

## 2023-07-24 RX ADMIN — HYDROXYZINE PAMOATE 50 MG: 50 CAPSULE ORAL at 02:01

## 2023-07-24 RX ADMIN — NICOTINE POLACRILEX 2 MG: 2 LOZENGE ORAL at 09:28

## 2023-07-24 RX ADMIN — BACITRACIN ZINC, NEOMYCIN SULFATE, POLYMYXIN B SULFATE: 3.5; 5000; 4 OINTMENT TOPICAL at 21:05

## 2023-07-24 RX ADMIN — OXCARBAZEPINE 450 MG: 300 TABLET, FILM COATED ORAL at 08:31

## 2023-07-24 RX ADMIN — OLANZAPINE 5 MG: 5 TABLET, FILM COATED ORAL at 08:31

## 2023-07-24 RX ADMIN — ACETAMINOPHEN 650 MG: 325 TABLET ORAL at 00:00

## 2023-07-24 RX ADMIN — OLANZAPINE 10 MG: 10 TABLET, FILM COATED ORAL at 21:04

## 2023-07-24 RX ADMIN — BACITRACIN ZINC, NEOMYCIN SULFATE, POLYMYXIN B SULFATE: 3.5; 5000; 4 OINTMENT TOPICAL at 08:31

## 2023-07-24 ASSESSMENT — PAIN SCALES - GENERAL
PAINLEVEL_OUTOF10: 3
PAINLEVEL_OUTOF10: 8

## 2023-07-24 ASSESSMENT — PAIN DESCRIPTION - LOCATION
LOCATION: KNEE
LOCATION: KNEE

## 2023-07-24 ASSESSMENT — PAIN DESCRIPTION - DESCRIPTORS: DESCRIPTORS: TEARING

## 2023-07-24 ASSESSMENT — PAIN DESCRIPTION - ORIENTATION
ORIENTATION: RIGHT
ORIENTATION: LEFT

## 2023-07-24 ASSESSMENT — PAIN - FUNCTIONAL ASSESSMENT: PAIN_FUNCTIONAL_ASSESSMENT: ACTIVITIES ARE NOT PREVENTED

## 2023-07-24 NOTE — GROUP NOTE
Group Therapy Note    Date: 7/24/2023    Group Start Time: 1110  Group End Time: 1210  Group Topic: Psychotherapy    [facility] ACUTE  14[address] KATY Galeana, LSW        Group Therapy Note    Attendees: 7       Patient's Goal:  To increase socialization and improve interpersonal relationships. Notes:  Pt was an active participant in group discussion. Status After Intervention:  Improved    Participation Level: Active Listener and Interactive    Participation Quality: Appropriate, Attentive, Sharing, and Supportive      Speech:  normal      Thought Process/Content: Linear  Perseverating      Affective Functioning: Exaggerated      Mood: elevated      Level of consciousness:  Alert, Oriented x4, and Attentive      Response to Learning: Able to verbalize current knowledge/experience, Able to verbalize/acknowledge new learning, Able to retain information, Capable of insight, and Progressing to goal      Endings: None Reported    Modes of Intervention: Support, Socialization, and Exploration      Discipline Responsible: /Counselor      Signature:   KATY Page, [address] [facility]

## 2023-07-25 PROCEDURE — 6370000000 HC RX 637 (ALT 250 FOR IP): Performed by: NURSE PRACTITIONER

## 2023-07-25 PROCEDURE — 6370000000 HC RX 637 (ALT 250 FOR IP): Performed by: PSYCHIATRY & NEUROLOGY

## 2023-07-25 PROCEDURE — 1240000000 HC EMOTIONAL WELLNESS R&B

## 2023-07-25 RX ADMIN — HYDROXYZINE PAMOATE 50 MG: 50 CAPSULE ORAL at 00:47

## 2023-07-25 RX ADMIN — NICOTINE POLACRILEX 2 MG: 2 LOZENGE ORAL at 16:11

## 2023-07-25 RX ADMIN — OXCARBAZEPINE 450 MG: 300 TABLET, FILM COATED ORAL at 20:56

## 2023-07-25 RX ADMIN — HYDROXYZINE PAMOATE 50 MG: 50 CAPSULE ORAL at 21:28

## 2023-07-25 RX ADMIN — NICOTINE POLACRILEX 2 MG: 2 LOZENGE ORAL at 11:11

## 2023-07-25 RX ADMIN — ACETAMINOPHEN 650 MG: 325 TABLET ORAL at 04:47

## 2023-07-25 RX ADMIN — NICOTINE POLACRILEX 2 MG: 2 LOZENGE ORAL at 19:17

## 2023-07-25 RX ADMIN — OLANZAPINE 5 MG: 5 TABLET, FILM COATED ORAL at 08:37

## 2023-07-25 RX ADMIN — NICOTINE POLACRILEX 2 MG: 2 LOZENGE ORAL at 13:15

## 2023-07-25 RX ADMIN — BACITRACIN ZINC, NEOMYCIN SULFATE, POLYMYXIN B SULFATE: 3.5; 5000; 4 OINTMENT TOPICAL at 13:16

## 2023-07-25 RX ADMIN — NICOTINE POLACRILEX 2 MG: 2 LOZENGE ORAL at 22:28

## 2023-07-25 RX ADMIN — NICOTINE POLACRILEX 2 MG: 2 LOZENGE ORAL at 05:40

## 2023-07-25 RX ADMIN — MELATONIN 3 MG ORAL TABLET 6 MG: 3 TABLET ORAL at 22:28

## 2023-07-25 RX ADMIN — BACITRACIN ZINC, NEOMYCIN SULFATE, POLYMYXIN B SULFATE: 3.5; 5000; 4 OINTMENT TOPICAL at 20:57

## 2023-07-25 RX ADMIN — OLANZAPINE 10 MG: 10 TABLET, FILM COATED ORAL at 20:56

## 2023-07-25 RX ADMIN — OXCARBAZEPINE 450 MG: 300 TABLET, FILM COATED ORAL at 08:37

## 2023-07-25 ASSESSMENT — PAIN SCALES - GENERAL
PAINLEVEL_OUTOF10: 3
PAINLEVEL_OUTOF10: 0

## 2023-07-25 ASSESSMENT — PAIN DESCRIPTION - LOCATION: LOCATION: KNEE

## 2023-07-25 ASSESSMENT — PAIN - FUNCTIONAL ASSESSMENT: PAIN_FUNCTIONAL_ASSESSMENT: ACTIVITIES ARE NOT PREVENTED

## 2023-07-25 NOTE — GROUP NOTE
Group Therapy Note    Date: 7/25/2023    Group Start Time: 1000  Group End Time: 1050  Group Topic: Psychoeducation    [facility] AC[facility]  Elm Aurora, MSW, LSW        Group Therapy Note    Attendees: 16       Patient's Goal:  Pt will be able to identify the symptoms that they have from anxiety, causes of anxiety, and treatment that could help to reduce their anxiety. Notes:  pt participated in group and made connections. Status After Intervention:  Improved    Participation Level:  Active Listener and Interactive    Participation Quality: Appropriate, Attentive, Sharing, and Supportive      Speech:  normal      Thought Process/Content: Logical  Linear      Affective Functioning: Congruent      Mood: anxious      Level of consciousness:  Alert, Oriented x4, and Attentive      Response to Learning: Able to verbalize current knowledge/experience, Able to verbalize/acknowledge new learning, Able to retain information, and Capable of insight      Endings: None Reported    Modes of Intervention: Education, Support, Socialization, Exploration, Clarifying, and Problem-solving      Discipline Responsible: /Counselor      Signature:  KATY Lindsay, [place] Subjective   31-year-old female presents to the emergency room with bilateral flank pain and dysuria.  Patient states she was seen at the urologist on Thursday diagnosed with urinary tract infection and told to wait until Monday till the urine culture came back to see if she needed p.o. or IV antibiotics.  Patient does state that she has had a history of kidney stones in the past and states the pain feels the same.  She denies fever or hematuria.  Denies any chance of pregnancy.  Aggravating factors include urination.  Denies any alleviating factors.  Denies any other complaints or concerns at this time.      History provided by:  Patient   used: No        Review of Systems   Constitutional: Negative.  Negative for fever.   HENT: Negative.    Respiratory: Negative.    Cardiovascular: Negative.  Negative for chest pain.   Gastrointestinal: Negative.  Negative for abdominal pain.   Endocrine: Negative.    Genitourinary: Positive for flank pain. Negative for dysuria.   Skin: Negative.    Neurological: Negative.    Psychiatric/Behavioral: Negative.    All other systems reviewed and are negative.      Past Medical History:   Diagnosis Date   • Congestive heart failure (CMS/HCC)    • Fibromyalgia    • GERD (gastroesophageal reflux disease)    • History of ear infections    • History of heart disease    • History of lupus    • History of migraine    • Hypertension    • Lown Ganong Parker syndrome    • Murmur    • Nephritis    • Osteoarthritis    • Pulmonary stenosis        No Known Allergies    Past Surgical History:   Procedure Laterality Date   •  SECTION     • DIAGNOSTIC LAPAROSCOPY Right 2018    Procedure: LAPAROSCOPIC EXTENSIVE LYSIS OF AHESIONS. DRAINAGE OF OVARIAN CYST.;  Surgeon: Rachel Caruso DO;  Location: [facility];  Service: Obstetrics/Gynecology   • ORIF SCAPHOID W VASCULARIZED BONE GRAFT     • TUBAL ABDOMINAL LIGATION     • TUBAL COAGULATION LAPAROSCOPIC Bilateral  8/13/2018    Procedure: TUBAL FALLOPE FILSHIE CLIPPING LAPAROSCOPIC;  Surgeon: Rachel Caruso DO;  Location: [facility] OR;  Service: Obstetrics/Gynecology       Family History   Problem Relation Age of Onset   • Heart disease Mother    • Thyroid disease Mother    • Anxiety disorder Mother    • Depression Mother    • Bipolar disorder Mother    • Heart disease Father    • Cancer Father    • Heart disease Maternal Grandmother    • Diabetes Maternal Grandmother    • Diabetes Maternal Grandfather    • Lupus Paternal Grandmother    • Rheum arthritis Paternal Grandmother        Social History     Socioeconomic History   • Marital status:      Spouse name: Not on file   • Number of children: Not on file   • Years of education: Not on file   • Highest education level: Not on file   Tobacco Use   • Smoking status: Former Smoker     Packs/day: 0.00     Types: Electronic Cigarette   • Smokeless tobacco: Never Used   Substance and Sexual Activity   • Alcohol use: Yes     Comment: socially   • Drug use: No   • Sexual activity: Yes     Partners: Male     Birth control/protection: Surgical           Objective   Physical Exam  Vitals and nursing note reviewed.   Constitutional:       General: She is not in acute distress.     Appearance: She is well-developed. She is not diaphoretic.   HENT:      Head: Normocephalic and atraumatic.      Right Ear: External ear normal.      Left Ear: External ear normal.      Nose: Nose normal.   Eyes:      Conjunctiva/sclera: Conjunctivae normal.      Pupils: Pupils are equal, round, and reactive to light.   Neck:      Vascular: No JVD.      Trachea: No tracheal deviation.   Cardiovascular:      Rate and Rhythm: Normal rate and regular rhythm.      Heart sounds: Normal heart sounds. No murmur heard.     Pulmonary:      Effort: Pulmonary effort is normal. No respiratory distress.      Breath sounds: Normal breath sounds. No wheezing.   Abdominal:      General: Bowel sounds are normal.       Palpations: Abdomen is soft.      Tenderness: There is abdominal tenderness. There is right CVA tenderness and left CVA tenderness.   Musculoskeletal:         General: No deformity. Normal range of motion.      Cervical back: Normal range of motion and neck supple.   Skin:     General: Skin is warm and dry.      Coloration: Skin is not pale.      Findings: No erythema or rash.   Neurological:      Mental Status: She is alert and oriented to person, place, and time.      Cranial Nerves: No cranial nerve deficit.   Psychiatric:         Behavior: Behavior normal.         Thought Content: Thought content normal.         Procedures           ED Course  ED Course as of Jun 19 2250   Sat Jun 19, 2021 2216 CT Abdomen Pelvis Stone Protocol [TK]      ED Course User Index  [TK] Ollie Aguilar PA-C                                           MDM  Number of Diagnoses or Management Options  UTI (urinary tract infection), uncomplicated: new and requires workup     Amount and/or Complexity of Data Reviewed  Clinical lab tests: reviewed and ordered  Tests in the radiology section of CPT®: reviewed and ordered    Risk of Complications, Morbidity, and/or Mortality  Presenting problems: moderate  Diagnostic procedures: moderate  Management options: moderate    Patient Progress  Patient progress: stable      Final diagnoses:   UTI (urinary tract infection), uncomplicated       ED Disposition  ED Disposition     ED Disposition Condition Comment    Discharge Stable           Maureen Gerardo, APRN  [address] [facility] [address] [phone]    In 2 days           Medication List      New Prescriptions    cefdinir 300 MG capsule  Commonly known as: OMNICEF  Take 1 capsule by mouth 2 (Two) Times a Day.     tioconazole 6.5 % vaginal ointment  Commonly known as: VAGISTAT  Insert  into the vagina 1 (One) Time for 1 dose.           Where to Get Your Medications      These medications were sent to [id number]  - [place], [place] - [address] AT SEC OF [address] [facility] - [phone]  - [phone] FX  [address], [place] [address]    Phone: [phone]   · cefdinir 300 MG capsule  · tioconazole 6.5 % vaginal ointment          Ollie Aguilar PAMeshaC  06/19/21 1371

## 2023-07-25 NOTE — GROUP NOTE
Group Therapy Note    Date: 7/25/2023    Group Start Time: 1110  Group End Time: 1210  Group Topic: Psychotherapy    [facility] ACUTE BH 1    KATY Rosales, BERHANE        Group Therapy Note    Attendees: 5       Patient's Goal:  To increase socialization and improve interpersonal relationships. Notes:  Pt was an active participant in group discussion. Status After Intervention:  Unchanged    Participation Level:  Active Listener and Interactive    Participation Quality: Attentive and Sharing      Speech:  normal      Thought Process/Content: Logical      Affective Functioning: Congruent      Mood: anxious and depressed      Level of consciousness:  Alert and Oriented x4      Response to Learning: Able to verbalize current knowledge/experience      Endings: None Reported    Modes of Intervention: Education, Support, and Socialization      Discipline Responsible: /Counselor      Signature:  KATY Scott, BERHANE

## 2023-07-25 NOTE — BH NOTE
Pt spoke to this nurse about her medications. Pt states that Vistaril helped in the past when taken routinely. Pt states that when at home, her medical marijuana helps her to sleep because of her PTSD. Pt continues to pace around the common area, though is now more coherent in her speech and thoughts.

## 2023-07-26 VITALS
DIASTOLIC BLOOD PRESSURE: 90 MMHG | BODY MASS INDEX: 27.97 KG/M2 | SYSTOLIC BLOOD PRESSURE: 112 MMHG | RESPIRATION RATE: 16 BRPM | HEIGHT: 66 IN | WEIGHT: 174 LBS | HEART RATE: 111 BPM | OXYGEN SATURATION: 98 % | TEMPERATURE: 97 F

## 2023-07-26 PROCEDURE — 6370000000 HC RX 637 (ALT 250 FOR IP): Performed by: NURSE PRACTITIONER

## 2023-07-26 PROCEDURE — 6370000000 HC RX 637 (ALT 250 FOR IP): Performed by: PSYCHIATRY & NEUROLOGY

## 2023-07-26 RX ORDER — POLYETHYLENE GLYCOL 3350 17 G
2 POWDER IN PACKET (EA) ORAL
Qty: 100 EACH | Refills: 3
Start: 2023-07-26 | End: 2023-08-25

## 2023-07-26 RX ORDER — OLANZAPINE 5 MG/1
5 TABLET ORAL DAILY
Qty: 30 TABLET | Refills: 0 | Status: SHIPPED | OUTPATIENT
Start: 2023-07-27 | End: 2023-08-26

## 2023-07-26 RX ORDER — OXCARBAZEPINE 150 MG/1
450 TABLET, FILM COATED ORAL 2 TIMES DAILY
Qty: 180 TABLET | Refills: 0 | Status: SHIPPED | OUTPATIENT
Start: 2023-07-26 | End: 2023-08-25

## 2023-07-26 RX ORDER — OLANZAPINE 10 MG/1
10 TABLET ORAL NIGHTLY
Qty: 30 TABLET | Refills: 0 | Status: SHIPPED | OUTPATIENT
Start: 2023-07-26 | End: 2023-08-25

## 2023-07-26 RX ORDER — LANOLIN ALCOHOL/MO/W.PET/CERES
6 CREAM (GRAM) TOPICAL NIGHTLY PRN
Qty: 60 TABLET | Refills: 0 | Status: SHIPPED | OUTPATIENT
Start: 2023-07-26 | End: 2023-08-25

## 2023-07-26 RX ADMIN — OLANZAPINE 5 MG: 5 TABLET, FILM COATED ORAL at 08:29

## 2023-07-26 RX ADMIN — OXCARBAZEPINE 450 MG: 300 TABLET, FILM COATED ORAL at 08:32

## 2023-07-26 RX ADMIN — NICOTINE POLACRILEX 2 MG: 2 LOZENGE ORAL at 09:14

## 2023-07-26 RX ADMIN — ACETAMINOPHEN 650 MG: 325 TABLET ORAL at 04:10

## 2023-07-26 RX ADMIN — NICOTINE POLACRILEX 2 MG: 2 LOZENGE ORAL at 15:00

## 2023-07-26 RX ADMIN — HYDROXYZINE PAMOATE 50 MG: 50 CAPSULE ORAL at 04:10

## 2023-07-26 ASSESSMENT — PAIN SCALES - GENERAL
PAINLEVEL_OUTOF10: 3
PAINLEVEL_OUTOF10: 0

## 2023-07-26 ASSESSMENT — PAIN - FUNCTIONAL ASSESSMENT: PAIN_FUNCTIONAL_ASSESSMENT: ACTIVITIES ARE NOT PREVENTED

## 2023-07-26 ASSESSMENT — PAIN DESCRIPTION - LOCATION: LOCATION: KNEE

## 2023-07-26 NOTE — GROUP NOTE
Group Therapy Note    Date: 7/26/2023    Group Start Time: 1110  Group End Time: 4378  Group Topic: Psychotherapy    [facility] AC[facility]  1416 KATY Galeana, [facility]        Group Therapy Note    Attendees: 5       Patient's Goal:  To increase socialization and improve interpersonal relationships. Notes:  Pt was an active participant in group discussion. Status After Intervention:  Improved    Participation Level: Active Listener and Interactive    Participation Quality: Appropriate, Attentive, Sharing, Supportive, and Intrusive      Speech:  normal      Thought Process/Content: Logical  Linear      Affective Functioning: Congruent      Mood: anxious and depressed      Level of consciousness:  Alert, Oriented x4, and Attentive      Response to Learning: Able to verbalize current knowledge/experience, Able to verbalize/acknowledge new learning, Able to retain information, Capable of insight, and Progressing to goal      Endings: None Reported    Modes of Intervention: Support, Socialization, and Exploration      Discipline Responsible: /Counselor      Signature:   KATY Hughes, [place]
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
## 2023-07-26 NOTE — PLAN OF CARE
[address] [facility]  Day 3 Interdisciplinary Treatment Plan NOTE    Review Date & Time:  7/17/23 1000    Patient was in treatment team    Estimated Length of Stay Update:   5 DAYS  Estimated Discharge Date Update:  WED.     EDUCATION:   Learner Progress Toward Treatment Goals: Reviewed results and recommendations of this team    Method: Small group    Outcome: Verbalized understanding and Needs reinforcement    PATIENT GOALS: \"FOCUS ON WHAT  NEED TO DO TO BE WELL\"    PLAN/TREATMENT RECOMMENDATIONS UPDATE: SUICIDE RISK ASSESSMENTS, SUPPORTIVE CARE, GROUPS, COLLATERAL INFORMATION, DISCHARGE PLANNING AND FOLLOW UP    GOALS UPDATE:   Time frame for Short-Term Goals:  5 DAYS      Karon Eagle RN
Excellent particip during this ara's 1 h group on astronomy and horses. Attentive to a/v materials. Scored 100 on quiz. Very supportive to peers. Appeared to fully digest key learning points.
Patient denies suicidal ideation, homicidal ideations and AVH. Patient denies anxiety and depression. Patient appears anxious but otherwise brightened and friendly. Presents calm and cooperative during assessment. Patient is out on the unit and is social with peers. Medications taken without issue. No complaints or concerns verbalized at this time. No unit problems reported. Will continue to observe and support. Problem: Elaine  Goal: Will exhibit normal sleep and speech and no impulsivity  Description: INTERVENTIONS:  1. Administer medication as ordered  2. Set limits on impulsive behavior  3. Make attempts to decrease external stimuli as possible  7/20/2023 2239 by Demetrice Porras RN  Outcome: Progressing  7/20/2023 1644 by Fransico Arechiga RN  Outcome: Progressing     Problem: Behavior  Goal: Pt/Family maintain appropriate behavior and adhere to behavioral management agreement, if implemented  Description: INTERVENTIONS:  1. Assess patient/family's coping skills and  non-compliant behavior (including use of illegal substances)  2. Notify security of behavior or suspected illegal substances which indicate the need for search of the family and/or belongings  3. Encourage verbalization of thoughts and concerns in a socially appropriate manner  4. Utilize positive, consistent limit setting strategies supporting safety of patient, staff and others  5. Encourage participation in the decision making process about the behavioral management agreement  6. If a visitor's behavior poses a threat to safety call refer to organization policy. 7. Initiate consult with , Psychosocial CNS, Spiritual Care as appropriate  7/20/2023 2239 by Demetrice Porras RN  Outcome: Progressing  7/20/2023 1644 by Fransico Arechiga RN  Outcome: Progressing     Problem: Sleep Disturbance  Goal: Will exhibit normal sleeping pattern  Description: INTERVENTIONS:  1. Administer medication as ordered  2.  Decrease environmental stimuli,
Problem: Behavior  Goal: Pt/Family maintain appropriate behavior and adhere to behavioral management agreement, if implemented  Description: INTERVENTIONS:  1. Assess patient/family's coping skills and  non-compliant behavior (including use of illegal substances)  2. Notify security of behavior or suspected illegal substances which indicate the need for search of the family and/or belongings  3. Encourage verbalization of thoughts and concerns in a socially appropriate manner  4. Utilize positive, consistent limit setting strategies supporting safety of patient, staff and others  5. Encourage participation in the decision making process about the behavioral management agreement  6. If a visitor's behavior poses a threat to safety call refer to organization policy. 7. Initiate consult with , Psychosocial CNS, Spiritual Care as appropriate  Outcome: Progressing  Flowsheets (Taken 7/18/2023 1025)  Patient/family maintains appropriate behavior and adheres to behavioral management agreement, if implemented: Utilize positive, consistent limit setting strategies supporting safety of patient, staff and others     Problem: Elaine  Goal: Will exhibit normal sleep and speech and no impulsivity  Description: INTERVENTIONS:  1. Administer medication as ordered  2. Set limits on impulsive behavior  3.  Make attempts to decrease external stimuli as possible  Outcome: Progressing
Problem: Elaine  Goal: Will exhibit normal sleep and speech and no impulsivity  Description: INTERVENTIONS:  1. Administer medication as ordered  2. Set limits on impulsive behavior  3. Make attempts to decrease external stimuli as possible  7/17/2023 1352 by Kerry Montemayor RN  Outcome: Progressing  7/17/2023 1352 by Kerry Montemayor RN  Outcome: Progressing  7/17/2023 0412 by Edna Cummins RN  Outcome: Progressing     Problem: Behavior  Goal: Pt/Family maintain appropriate behavior and adhere to behavioral management agreement, if implemented  Description: INTERVENTIONS:  1. Assess patient/family's coping skills and  non-compliant behavior (including use of illegal substances)  2. Notify security of behavior or suspected illegal substances which indicate the need for search of the family and/or belongings  3. Encourage verbalization of thoughts and concerns in a socially appropriate manner  4. Utilize positive, consistent limit setting strategies supporting safety of patient, staff and others  5. Encourage participation in the decision making process about the behavioral management agreement  6. If a visitor's behavior poses a threat to safety call refer to organization policy. 7. Initiate consult with , Psychosocial CNS, Spiritual Care as appropriate  7/17/2023 1352 by Kerry Montemayor RN  Outcome: Progressing  7/17/2023 1352 by Kerry Montemayor RN  Outcome: Progressing  7/17/2023 0412 by Edna Cummins RN  Outcome: Progressing     Problem: Sleep Disturbance  Goal: Will exhibit normal sleeping pattern  Description: INTERVENTIONS:  1. Administer medication as ordered  2. Decrease environmental stimuli, including noise, as appropriate  3.  Discourage social isolation and naps during the day  7/17/2023 1352 by Kerry Montemayor RN  Outcome: Progressing  7/17/2023 1352 by Kerry Montemayor RN  Outcome: Progressing  7/17/2023 0412 by Edna Cummins RN  Outcome: Progressing     Problem: Involuntary Admit  Goal: Will cooperate
Problem: Elaine  Goal: Will exhibit normal sleep and speech and no impulsivity  Description: INTERVENTIONS:  1. Administer medication as ordered  2. Set limits on impulsive behavior  3. Make attempts to decrease external stimuli as possible  7/23/2023 2117 by Luann Cowart RN  Outcome: Progressing  7/23/2023 0852 by Fausto Ceja RN  Outcome: Progressing     Problem: Behavior  Goal: Pt/Family maintain appropriate behavior and adhere to behavioral management agreement, if implemented  Description: INTERVENTIONS:  1. Assess patient/family's coping skills and  non-compliant behavior (including use of illegal substances)  2. Notify security of behavior or suspected illegal substances which indicate the need for search of the family and/or belongings  3. Encourage verbalization of thoughts and concerns in a socially appropriate manner  4. Utilize positive, consistent limit setting strategies supporting safety of patient, staff and others  5. Encourage participation in the decision making process about the behavioral management agreement  6. If a visitor's behavior poses a threat to safety call refer to organization policy. 7. Initiate consult with , Psychosocial CNS, Spiritual Care as appropriate  7/23/2023 2117 by Luann Cowart RN  Outcome: Progressing  7/23/2023 0852 by Fausto Ceja RN  Outcome: Progressing  Flowsheets (Taken 7/23/2023 4897)  Patient/family maintains appropriate behavior and adheres to behavioral management agreement, if implemented: Encourage verbalization of thoughts and concerns in a socially appropriate manner     Problem: Sleep Disturbance  Goal: Will exhibit normal sleeping pattern  Description: INTERVENTIONS:  1. Administer medication as ordered  2. Decrease environmental stimuli, including noise, as appropriate  3.  Discourage social isolation and naps during the day  7/23/2023 2117 by Luann Cowart RN  Outcome: Progressing  7/23/2023 0852 by Fausto Ceja
Problem: Elaine  Goal: Will exhibit normal sleep and speech and no impulsivity  Description: INTERVENTIONS:  1. Administer medication as ordered  2. Set limits on impulsive behavior  3. Make attempts to decrease external stimuli as possible  7/26/2023 0941 by Nara Castano RN  Outcome: Progressing  7/25/2023 2229 by Frida Bertrand RN  Outcome: Progressing     Problem: Behavior  Goal: Pt/Family maintain appropriate behavior and adhere to behavioral management agreement, if implemented  Description: INTERVENTIONS:  1. Assess patient/family's coping skills and  non-compliant behavior (including use of illegal substances)  2. Notify security of behavior or suspected illegal substances which indicate the need for search of the family and/or belongings  3. Encourage verbalization of thoughts and concerns in a socially appropriate manner  4. Utilize positive, consistent limit setting strategies supporting safety of patient, staff and others  5. Encourage participation in the decision making process about the behavioral management agreement  6. If a visitor's behavior poses a threat to safety call refer to organization policy. 7. Initiate consult with , Psychosocial CNS, Spiritual Care as appropriate  7/26/2023 0941 by Nara Castano RN  Outcome: Progressing  7/25/2023 2229 by Frida Bertrand RN  Outcome: Progressing     Problem: Sleep Disturbance  Goal: Will exhibit normal sleeping pattern  Description: INTERVENTIONS:  1. Administer medication as ordered  2. Decrease environmental stimuli, including noise, as appropriate  3.  Discourage social isolation and naps during the day  7/26/2023 0941 by Nara Castano RN  Outcome: Progressing  7/25/2023 2229 by Frida Bertrand RN  Outcome: Progressing
Problem: Elaine  Goal: Will exhibit normal sleep and speech and no impulsivity  Description: INTERVENTIONS:  1. Administer medication as ordered  2. Set limits on impulsive behavior  3. Make attempts to decrease external stimuli as possible  7/26/2023 0941 by Regina Kapadia RN  Outcome: Progressing  7/25/2023 2229 by Maria Isabel Barrios RN  Outcome: Progressing     Problem: Behavior  Goal: Pt/Family maintain appropriate behavior and adhere to behavioral management agreement, if implemented  Description: INTERVENTIONS:  1. Assess patient/family's coping skills and  non-compliant behavior (including use of illegal substances)  2. Notify security of behavior or suspected illegal substances which indicate the need for search of the family and/or belongings  3. Encourage verbalization of thoughts and concerns in a socially appropriate manner  4. Utilize positive, consistent limit setting strategies supporting safety of patient, staff and others  5. Encourage participation in the decision making process about the behavioral management agreement  6. If a visitor's behavior poses a threat to safety call refer to organization policy. 7. Initiate consult with , Psychosocial CNS, Spiritual Care as appropriate  7/26/2023 0941 by Regina Kapadia RN  Outcome: Progressing  7/25/2023 2229 by Maria Isabel Barrios RN  Outcome: Progressing     Problem: Sleep Disturbance  Goal: Will exhibit normal sleeping pattern  Description: INTERVENTIONS:  1. Administer medication as ordered  2. Decrease environmental stimuli, including noise, as appropriate  3.  Discourage social isolation and naps during the day  7/26/2023 0941 by Regina Kapadia RN  Outcome: Progressing  7/25/2023 2229 by Maria Isabel Barrios RN  Outcome: Progressing
Problem: Elaine  Goal: Will exhibit normal sleep and speech and no impulsivity  Description: INTERVENTIONS:  1. Administer medication as ordered  2. Set limits on impulsive behavior  3. Make attempts to decrease external stimuli as possible  Outcome: Progressing     Problem: Behavior  Goal: Pt/Family maintain appropriate behavior and adhere to behavioral management agreement, if implemented  Description: INTERVENTIONS:  1. Assess patient/family's coping skills and  non-compliant behavior (including use of illegal substances)  2. Notify security of behavior or suspected illegal substances which indicate the need for search of the family and/or belongings  3. Encourage verbalization of thoughts and concerns in a socially appropriate manner  4. Utilize positive, consistent limit setting strategies supporting safety of patient, staff and others  5. Encourage participation in the decision making process about the behavioral management agreement  6. If a visitor's behavior poses a threat to safety call refer to organization policy. 7. Initiate consult with , Psychosocial CNS, Spiritual Care as appropriate  Outcome: Progressing     Problem: Sleep Disturbance  Goal: Will exhibit normal sleeping pattern  Description: INTERVENTIONS:  1. Administer medication as ordered  2. Decrease environmental stimuli, including noise, as appropriate  3.  Discourage social isolation and naps during the day  Outcome: Progressing
Problem: Elaine  Goal: Will exhibit normal sleep and speech and no impulsivity  Description: INTERVENTIONS:  1. Administer medication as ordered  2. Set limits on impulsive behavior  3. Make attempts to decrease external stimuli as possible  Outcome: Progressing     Problem: Behavior  Goal: Pt/Family maintain appropriate behavior and adhere to behavioral management agreement, if implemented  Description: INTERVENTIONS:  1. Assess patient/family's coping skills and  non-compliant behavior (including use of illegal substances)  2. Notify security of behavior or suspected illegal substances which indicate the need for search of the family and/or belongings  3. Encourage verbalization of thoughts and concerns in a socially appropriate manner  4. Utilize positive, consistent limit setting strategies supporting safety of patient, staff and others  5. Encourage participation in the decision making process about the behavioral management agreement  6. If a visitor's behavior poses a threat to safety call refer to organization policy. 7. Initiate consult with , Psychosocial CNS, Spiritual Care as appropriate  Outcome: Progressing     Problem: Sleep Disturbance  Goal: Will exhibit normal sleeping pattern  Description: INTERVENTIONS:  1. Administer medication as ordered  2. Decrease environmental stimuli, including noise, as appropriate  3. Discourage social isolation and naps during the day  Outcome: Progressing     Problem: Involuntary Admit  Goal: Will cooperate with staff recommendations and doctor's orders and will demonstrate appropriate behavior  Description: INTERVENTIONS:  1. Treat underlying conditions and offer medication as ordered  2. Educate regarding involuntary admission procedures and rules  3.  Contain excessive/inappropriate behavior per unit and hospital policies  2/31/6381 [id number] by Edvin Alonzo RN  Outcome: Progressing
Problem: Elaine  Goal: Will exhibit normal sleep and speech and no impulsivity  Description: INTERVENTIONS:  1. Administer medication as ordered  2. Set limits on impulsive behavior  3. Make attempts to decrease external stimuli as possible  Outcome: Progressing     Problem: Behavior  Goal: Pt/Family maintain appropriate behavior and adhere to behavioral management agreement, if implemented  Description: INTERVENTIONS:  1. Assess patient/family's coping skills and  non-compliant behavior (including use of illegal substances)  2. Notify security of behavior or suspected illegal substances which indicate the need for search of the family and/or belongings  3. Encourage verbalization of thoughts and concerns in a socially appropriate manner  4. Utilize positive, consistent limit setting strategies supporting safety of patient, staff and others  5. Encourage participation in the decision making process about the behavioral management agreement  6. If a visitor's behavior poses a threat to safety call refer to organization policy. 7. Initiate consult with , Psychosocial CNS, Spiritual Care as appropriate  Outcome: Progressing     Problem: Sleep Disturbance  Goal: Will exhibit normal sleeping pattern  Description: INTERVENTIONS:  1. Administer medication as ordered  2. Decrease environmental stimuli, including noise, as appropriate  3. Discourage social isolation and naps during the day  Outcome: Progressing     Pt denies SI, HI and AVH. Pt presents anxious. Impulsive. Labile. Intrusive. Attends groups. Medications compliant. Focused on staff lying to her and correct hours of sleep. Will continue to monitor.
Problem: Leaine  Goal: Will exhibit normal sleep and speech and no impulsivity  Description: INTERVENTIONS:  1. Administer medication as ordered  2. Set limits on impulsive behavior  3. Make attempts to decrease external stimuli as possible  7/17/2023 2023 by Reilly Calzada RN  Outcome: Progressing  7/17/2023 1352 by Rochelle Robison RN  Outcome: Progressing  7/17/2023 1352 by Rochelle Robison RN  Outcome: Progressing     Problem: Behavior  Goal: Pt/Family maintain appropriate behavior and adhere to behavioral management agreement, if implemented  Description: INTERVENTIONS:  1. Assess patient/family's coping skills and  non-compliant behavior (including use of illegal substances)  2. Notify security of behavior or suspected illegal substances which indicate the need for search of the family and/or belongings  3. Encourage verbalization of thoughts and concerns in a socially appropriate manner  4. Utilize positive, consistent limit setting strategies supporting safety of patient, staff and others  5. Encourage participation in the decision making process about the behavioral management agreement  6. If a visitor's behavior poses a threat to safety call refer to organization policy. 7. Initiate consult with , Psychosocial CNS, Spiritual Care as appropriate  7/17/2023 2023 by Reilly Calzada RN  Outcome: Progressing  7/17/2023 1352 by Rochelle Robison RN  Outcome: Progressing  7/17/2023 1352 by Rochelle Robison RN  Outcome: Progressing     Problem: Sleep Disturbance  Goal: Will exhibit normal sleeping pattern  Description: INTERVENTIONS:  1. Administer medication as ordered  2. Decrease environmental stimuli, including noise, as appropriate  3. Discourage social isolation and naps during the day  7/17/2023 2023 by Reilly Calzada RN  Outcome: Progressing  7/17/2023 1352 by Rochelle Robison RN  Outcome: Progressing  7/17/2023 1352 by Rochelle Robison RN  Outcome: Progressing     Pt denies SI, HI and AVH.  Pt stated her
Pt at
Pt denies suicidal ideation, homicidal ideation, and AV hallucinations. Pt has loud, rapid, pressured, speech, with flight of ideas. Her mood is labile, she is currently tearful and crying because \"I miss my fiance\". Pt reports feeling very tired. She is refusing to take depakote, Lucie Kwasi NP aware. Pt did agree to take vistaril. Encouraged patient to rest.      Problem: Elaine  Goal: Will exhibit normal sleep and speech and no impulsivity  Description: INTERVENTIONS:  1. Administer medication as ordered  2. Set limits on impulsive behavior  3. Make attempts to decrease external stimuli as possible  Outcome: Progressing     Problem: Behavior  Goal: Pt/Family maintain appropriate behavior and adhere to behavioral management agreement, if implemented  Description: INTERVENTIONS:  1. Assess patient/family's coping skills and  non-compliant behavior (including use of illegal substances)  2. Notify security of behavior or suspected illegal substances which indicate the need for search of the family and/or belongings  3. Encourage verbalization of thoughts and concerns in a socially appropriate manner  4. Utilize positive, consistent limit setting strategies supporting safety of patient, staff and others  5. Encourage participation in the decision making process about the behavioral management agreement  6. If a visitor's behavior poses a threat to safety call refer to organization policy. 7. Initiate consult with , Psychosocial CNS, Spiritual Care as appropriate  Outcome: Progressing     Problem: Sleep Disturbance  Goal: Will exhibit normal sleeping pattern  Description: INTERVENTIONS:  1. Administer medication as ordered  2. Decrease environmental stimuli, including noise, as appropriate  3.  Discourage social isolation and naps during the day  Outcome: Progressing     Problem: Involuntary Admit  Goal: Will cooperate with staff recommendations and doctor's orders and will demonstrate appropriate
Pt denies suicidal ideation, homicidal ideation, and AV hallucinations. She reports sleeping well. Pt is brightened, exaggerated, elevated, and has pressured speech. She is social on the unit, friendly, and cooperative. She is impulsive at times, and has poor judgement/poor insight. She is compliant with the trileptal and zyprexa, and she is attending groups. Problem: Elaine  Goal: Will exhibit normal sleep and speech and no impulsivity  Description: INTERVENTIONS:  1. Administer medication as ordered  2. Set limits on impulsive behavior  3. Make attempts to decrease external stimuli as possible  Outcome: Progressing     Problem: Behavior  Goal: Pt/Family maintain appropriate behavior and adhere to behavioral management agreement, if implemented  Description: INTERVENTIONS:  1. Assess patient/family's coping skills and  non-compliant behavior (including use of illegal substances)  2. Notify security of behavior or suspected illegal substances which indicate the need for search of the family and/or belongings  3. Encourage verbalization of thoughts and concerns in a socially appropriate manner  4. Utilize positive, consistent limit setting strategies supporting safety of patient, staff and others  5. Encourage participation in the decision making process about the behavioral management agreement  6. If a visitor's behavior poses a threat to safety call refer to organization policy. 7. Initiate consult with , Psychosocial CNS, Spiritual Care as appropriate  Outcome: Progressing     Problem: Sleep Disturbance  Goal: Will exhibit normal sleeping pattern  Description: INTERVENTIONS:  1. Administer medication as ordered  2. Decrease environmental stimuli, including noise, as appropriate  3.  Discourage social isolation and naps during the day  Outcome: Progressing     Problem: Involuntary Admit  Goal: Will cooperate with staff recommendations and doctor's orders and will demonstrate appropriate
Pt resting in bed apparently asleep with easy even respirations at HS q 15 min electronic rounding.
Pt up at 2AM with C/O \"anxiety and received prn po Vistaril 50 mg and did not return to bed until 2 hrs later. Had broken sleep, and then was back up at 4:30 AM.  Pt paced in halls,interacted with a peer and had to be reminded to talk softly. Pt affect drowsy and had mumbling speech and flight of ideas. Remains on close observations.
Pt up frequently at NS, intrusive with staff and peers, argumentative, difficult to redirect.
Admit  Goal: Will cooperate with staff recommendations and doctor's orders and will demonstrate appropriate behavior  Description: INTERVENTIONS:  1. Treat underlying conditions and offer medication as ordered  2. Educate regarding involuntary admission procedures and rules  3. Contain excessive/inappropriate behavior per unit and hospital policies  7/13/6741 2123 by Farooq Snider RN  Outcome: Progressing  7/24/2023 0515 by Maty Adler RN  Outcome: Progressing     Problem: Involuntary Admit  Goal: Will cooperate with staff recommendations and doctor's orders and will demonstrate appropriate behavior  Description: INTERVENTIONS:  1. Treat underlying conditions and offer medication as ordered  2. Educate regarding involuntary admission procedures and rules  3.  Contain excessive/inappropriate behavior per unit and hospital policies  1/15/0785 3335 by Farooq Snider RN  Outcome: Progressing  7/24/2023 0515 by Maty Adler RN  Outcome: Progressing     Problem: Pain  Goal: Verbalizes/displays adequate comfort level or baseline comfort level  7/24/2023 0840 by Farooq Snider RN  Outcome: Progressing  7/24/2023 0515 by Maty Adler RN  Outcome: Progressing     Problem: Risk for Elopement  Goal: Patient will not exit the unit/facility without proper excort  7/24/2023 0840 by Farooq Snider RN  Outcome: Progressing  7/24/2023 0515 by Maty Adler RN  Outcome: Progressing     Problem: Safety - Adult  Goal: Free from fall injury  7/24/2023 0840 by Farooq Snider RN  Outcome: Progressing  7/24/2023 0515 by Maty Adler RN  Outcome: Progressing
Disturbance  Goal: Will exhibit normal sleeping pattern  Description: INTERVENTIONS:  1. Administer medication as ordered  2. Decrease environmental stimuli, including noise, as appropriate  3.  Discourage social isolation and naps during the day  7/25/2023 2229 by Joshua Mclain RN  Outcome: Progressing  7/25/2023 0910 by Emily Peguero RN  Outcome: Progressing

## 2023-07-26 NOTE — GROUP NOTE
Group Therapy Note    Date: 7/26/2023    Group Start Time: 2928  Group End Time: 1165  Group Topic: Psychoeducation    [facility] 2    Eric Smith                                                                        Group Therapy Note    Date: 7/26/2023  Start Time: 7035  End Time:  1050  Number of Participants: 16    Type of Group: Psychoeducation    Wellness Binder Information  Module Name:  Grounding and 47595 Method    Patient's Goal:  Increase awareness of different types of grounding techniques. Demonstrate knowledge of 96923 method. Notes:  CTRS discussed different grounding techniques and demonstrated the 27633 method. Patient engaged in discussion and was receptive of handout. Status After Intervention:  Improved    Participation Level:  Active Listener and Interactive    Participation Quality: Sharing      Speech:  normal      Thought Process/Content: Logical      Affective Functioning: Congruent      Mood: euthymic      Level of consciousness:  Attentive and Inattentive at different times      Response to Learning: Able to verbalize current knowledge/experience and Able to verbalize/acknowledge new learning      Endings: None Reported    Modes of Intervention: Education, Support, and Exploration      Discipline Responsible: Psychoeducational Specialist      Signature:  Eric Smith

## 2023-07-26 NOTE — BH NOTE
At this time pt expresses concerns re zyprexa. Pt states that the medication is making her drowsy. This nurse educated pt on medication including purpose and adjustment period. This nurse advises pt to address concerns c provider. Pt expresses understanding of education and intent to continue taking medication as prescribed.

## 2023-07-28 NOTE — DISCHARGE SUMMARY
harm to herself or others even though it may be unintentional.  She demonstrated understanding of this and has the capacity understand this    Patient is counseled her mental health treatment will be difficult to optimize with ongoing use of drugs or alcohol she demonstrated understanding of this and has the capacity to understand this     Patient was counseled that we do not recommend patient continuing her prescribed Vyvanse she demonstrate understanding of this and has the capacity understand this      Patient is counseled she must remain compliant with all medications outpatient follow-up appointments    Patient is discharged to the crisis unit in stable condition        TIME SPEND - 35 MINUTES TO COMPLETE THE EVALUATION, DISCHARGE SUMMARY, MEDICATION RECONCILIATION AND FOLLOW UP CARE     Signed:  MUNDO Olson CNP  9/89/1091  5:24 PM

## 2023-08-17 ENCOUNTER — HOSPITAL ENCOUNTER (EMERGENCY)
Age: 28
Discharge: HOME | End: 2023-08-17
Attending: EMERGENCY MEDICINE
Payer: MEDICAID

## 2023-08-17 ENCOUNTER — APPOINTMENT (OUTPATIENT)
Dept: GENERAL RADIOLOGY | Age: 28
End: 2023-08-17
Payer: MEDICAID

## 2023-08-17 VITALS
TEMPERATURE: 97.5 F | SYSTOLIC BLOOD PRESSURE: 128 MMHG | WEIGHT: 183.4 LBS | OXYGEN SATURATION: 100 % | RESPIRATION RATE: 20 BRPM | DIASTOLIC BLOOD PRESSURE: 86 MMHG | BODY MASS INDEX: 29.6 KG/M2 | HEART RATE: 92 BPM

## 2023-08-17 DIAGNOSIS — R05.1 ACUTE COUGH: Primary | ICD-10-CM

## 2023-08-17 DIAGNOSIS — R06.2 WHEEZING: ICD-10-CM

## 2023-08-17 PROCEDURE — 6370000000 HC RX 637 (ALT 250 FOR IP): Performed by: STUDENT IN AN ORGANIZED HEALTH CARE EDUCATION/TRAINING PROGRAM

## 2023-08-17 PROCEDURE — 99283 EMERGENCY DEPT VISIT LOW MDM: CPT

## 2023-08-17 PROCEDURE — 71045 X-RAY EXAM CHEST 1 VIEW: CPT

## 2023-08-17 RX ORDER — METHYLPREDNISOLONE 4 MG/1
TABLET ORAL
Qty: 1 KIT | Refills: 0 | Status: SHIPPED | OUTPATIENT
Start: 2023-08-17

## 2023-08-17 RX ORDER — ZOLPIDEM TARTRATE 5 MG/1
5 TABLET ORAL NIGHTLY PRN
COMMUNITY

## 2023-08-17 RX ORDER — ALBUTEROL SULFATE 90 UG/1
2 AEROSOL, METERED RESPIRATORY (INHALATION) 4 TIMES DAILY PRN
Qty: 18 G | Refills: 0 | Status: SHIPPED | OUTPATIENT
Start: 2023-08-17

## 2023-08-17 RX ORDER — GUAIFENESIN/DEXTROMETHORPHAN 100-10MG/5
5 SYRUP ORAL EVERY 8 HOURS PRN
Qty: 120 ML | Refills: 0 | Status: SHIPPED | OUTPATIENT
Start: 2023-08-17

## 2023-08-17 RX ORDER — IPRATROPIUM BROMIDE AND ALBUTEROL SULFATE 2.5; .5 MG/3ML; MG/3ML
1 SOLUTION RESPIRATORY (INHALATION) ONCE
Status: COMPLETED | OUTPATIENT
Start: 2023-08-17 | End: 2023-08-17

## 2023-08-17 RX ORDER — BENZONATATE 100 MG/1
100 CAPSULE ORAL EVERY 8 HOURS PRN
Qty: 15 CAPSULE | Refills: 0 | Status: SHIPPED | OUTPATIENT
Start: 2023-08-17

## 2023-08-17 RX ORDER — ALBUTEROL SULFATE 90 UG/1
2 AEROSOL, METERED RESPIRATORY (INHALATION) 4 TIMES DAILY PRN
Qty: 18 G | Refills: 0 | Status: SHIPPED | OUTPATIENT
Start: 2023-08-17 | End: 2023-08-17 | Stop reason: SDUPTHER

## 2023-08-17 RX ORDER — GUAIFENESIN/DEXTROMETHORPHAN 100-10MG/5
5 SYRUP ORAL EVERY 8 HOURS PRN
Qty: 120 ML | Refills: 0 | Status: SHIPPED | OUTPATIENT
Start: 2023-08-17 | End: 2023-08-17 | Stop reason: SDUPTHER

## 2023-08-17 RX ORDER — METHYLPREDNISOLONE 4 MG/1
TABLET ORAL
Qty: 1 KIT | Refills: 0 | Status: SHIPPED | OUTPATIENT
Start: 2023-08-17 | End: 2023-08-17 | Stop reason: SDUPTHER

## 2023-08-17 RX ORDER — DEXAMETHASONE SODIUM PHOSPHATE 4 MG/ML
8 INJECTION, SOLUTION INTRA-ARTICULAR; INTRALESIONAL; INTRAMUSCULAR; INTRAVENOUS; SOFT TISSUE ONCE
Status: DISCONTINUED | OUTPATIENT
Start: 2023-08-17 | End: 2023-08-17

## 2023-08-17 RX ORDER — BENZONATATE 100 MG/1
100 CAPSULE ORAL EVERY 8 HOURS PRN
Qty: 15 CAPSULE | Refills: 0 | Status: SHIPPED | OUTPATIENT
Start: 2023-08-17 | End: 2023-08-17 | Stop reason: SDUPTHER

## 2023-08-17 RX ADMIN — IPRATROPIUM BROMIDE AND ALBUTEROL SULFATE 1 DOSE: .5; 3 SOLUTION RESPIRATORY (INHALATION) at 06:39

## 2023-08-17 ASSESSMENT — ENCOUNTER SYMPTOMS
NAUSEA: 0
VOMITING: 0
COUGH: 1
SHORTNESS OF BREATH: 0
WHEEZING: 1
SORE THROAT: 0

## 2023-08-17 ASSESSMENT — LIFESTYLE VARIABLES: HOW OFTEN DO YOU HAVE A DRINK CONTAINING ALCOHOL: MONTHLY OR LESS

## 2023-08-17 NOTE — DISCHARGE INSTRUCTIONS
Please return to the ER for any new or worsening symptoms  If prescribed, please be sure to  your prescriptions from the pharmacy  Please follow-up with Primary care provider as instructed

## 2023-11-10 ENCOUNTER — HOSPITAL ENCOUNTER (EMERGENCY)
Age: 28
Discharge: HOME | End: 2023-11-10
Attending: EMERGENCY MEDICINE
Payer: MEDICAID

## 2023-11-10 VITALS
SYSTOLIC BLOOD PRESSURE: 138 MMHG | TEMPERATURE: 98.1 F | RESPIRATION RATE: 18 BRPM | HEART RATE: 102 BPM | DIASTOLIC BLOOD PRESSURE: 99 MMHG | OXYGEN SATURATION: 98 %

## 2023-11-10 DIAGNOSIS — L08.9 INFECTED ABRASION: Primary | ICD-10-CM

## 2023-11-10 DIAGNOSIS — T14.8XXA INFECTED ABRASION: Primary | ICD-10-CM

## 2023-11-10 PROCEDURE — 6360000002 HC RX W HCPCS: Performed by: EMERGENCY MEDICINE

## 2023-11-10 PROCEDURE — 99284 EMERGENCY DEPT VISIT MOD MDM: CPT

## 2023-11-10 PROCEDURE — 90471 IMMUNIZATION ADMIN: CPT | Performed by: EMERGENCY MEDICINE

## 2023-11-10 PROCEDURE — 90714 TD VACC NO PRESV 7 YRS+ IM: CPT | Performed by: EMERGENCY MEDICINE

## 2023-11-10 RX ORDER — CEPHALEXIN 500 MG/1
500 CAPSULE ORAL 4 TIMES DAILY
Qty: 28 CAPSULE | Refills: 0 | Status: SHIPPED | OUTPATIENT
Start: 2023-11-10 | End: 2023-11-17

## 2023-11-10 RX ORDER — TETANUS AND DIPHTHERIA TOXOIDS ADSORBED 2; 2 [LF]/.5ML; [LF]/.5ML
0.5 INJECTION INTRAMUSCULAR ONCE
Status: COMPLETED | OUTPATIENT
Start: 2023-11-10 | End: 2023-11-10

## 2023-11-10 RX ADMIN — TETANUS AND DIPHTHERIA TOXOIDS ADSORBED 0.5 ML: 2; 2 INJECTION INTRAMUSCULAR at 06:22

## 2023-11-10 ASSESSMENT — LIFESTYLE VARIABLES: HOW OFTEN DO YOU HAVE A DRINK CONTAINING ALCOHOL: MONTHLY OR LESS

## 2024-01-10 ENCOUNTER — OFFICE VISIT (OUTPATIENT)
Dept: PRIMARY CARE CLINIC | Age: 29
End: 2024-01-10

## 2024-01-10 VITALS
HEIGHT: 66 IN | BODY MASS INDEX: 25.39 KG/M2 | DIASTOLIC BLOOD PRESSURE: 72 MMHG | RESPIRATION RATE: 16 BRPM | WEIGHT: 158 LBS | SYSTOLIC BLOOD PRESSURE: 120 MMHG | TEMPERATURE: 97.6 F | HEART RATE: 78 BPM | OXYGEN SATURATION: 99 %

## 2024-01-10 DIAGNOSIS — F41.9 ANXIETY AND DEPRESSION: ICD-10-CM

## 2024-01-10 DIAGNOSIS — F12.10 MARIJUANA ABUSE: Primary | ICD-10-CM

## 2024-01-10 DIAGNOSIS — F32.A ANXIETY AND DEPRESSION: ICD-10-CM

## 2024-01-10 SDOH — ECONOMIC STABILITY: INCOME INSECURITY: HOW HARD IS IT FOR YOU TO PAY FOR THE VERY BASICS LIKE FOOD, HOUSING, MEDICAL CARE, AND HEATING?: NOT HARD AT ALL

## 2024-01-10 SDOH — ECONOMIC STABILITY: FOOD INSECURITY: WITHIN THE PAST 12 MONTHS, THE FOOD YOU BOUGHT JUST DIDN'T LAST AND YOU DIDN'T HAVE MONEY TO GET MORE.: NEVER TRUE

## 2024-01-10 SDOH — ECONOMIC STABILITY: FOOD INSECURITY: WITHIN THE PAST 12 MONTHS, YOU WORRIED THAT YOUR FOOD WOULD RUN OUT BEFORE YOU GOT MONEY TO BUY MORE.: NEVER TRUE

## 2024-01-10 SDOH — ECONOMIC STABILITY: HOUSING INSECURITY
IN THE LAST 12 MONTHS, WAS THERE A TIME WHEN YOU DID NOT HAVE A STEADY PLACE TO SLEEP OR SLEPT IN A SHELTER (INCLUDING NOW)?: NO

## 2024-01-10 ASSESSMENT — PATIENT HEALTH QUESTIONNAIRE - PHQ9
10. IF YOU CHECKED OFF ANY PROBLEMS, HOW DIFFICULT HAVE THESE PROBLEMS MADE IT FOR YOU TO DO YOUR WORK, TAKE CARE OF THINGS AT HOME, OR GET ALONG WITH OTHER PEOPLE: 0
6. FEELING BAD ABOUT YOURSELF - OR THAT YOU ARE A FAILURE OR HAVE LET YOURSELF OR YOUR FAMILY DOWN: 0
3. TROUBLE FALLING OR STAYING ASLEEP: 1
SUM OF ALL RESPONSES TO PHQ QUESTIONS 1-9: 6
1. LITTLE INTEREST OR PLEASURE IN DOING THINGS: 1
SUM OF ALL RESPONSES TO PHQ9 QUESTIONS 1 & 2: 2
8. MOVING OR SPEAKING SO SLOWLY THAT OTHER PEOPLE COULD HAVE NOTICED. OR THE OPPOSITE, BEING SO FIGETY OR RESTLESS THAT YOU HAVE BEEN MOVING AROUND A LOT MORE THAN USUAL: 0
5. POOR APPETITE OR OVEREATING: 1
SUM OF ALL RESPONSES TO PHQ QUESTIONS 1-9: 6
4. FEELING TIRED OR HAVING LITTLE ENERGY: 1
7. TROUBLE CONCENTRATING ON THINGS, SUCH AS READING THE NEWSPAPER OR WATCHING TELEVISION: 1
SUM OF ALL RESPONSES TO PHQ QUESTIONS 1-9: 6
2. FEELING DOWN, DEPRESSED OR HOPELESS: 1
9. THOUGHTS THAT YOU WOULD BE BETTER OFF DEAD, OR OF HURTING YOURSELF: 0
SUM OF ALL RESPONSES TO PHQ QUESTIONS 1-9: 6

## 2024-01-10 ASSESSMENT — ENCOUNTER SYMPTOMS
ABDOMINAL PAIN: 0
SHORTNESS OF BREATH: 0
VOMITING: 0
NAUSEA: 0
DIARRHEA: 0
COUGH: 0

## 2024-01-10 NOTE — PROGRESS NOTES
gallop.   Pulmonary:      Effort: Pulmonary effort is normal.      Breath sounds: Normal breath sounds.   Abdominal:      Palpations: There is no hepatomegaly or splenomegaly.      Tenderness: There is no abdominal tenderness.   Skin:     Coloration: Skin is not cyanotic.      Findings: No bruising or rash.      Nails: There is no clubbing.   Neurological:      Deep Tendon Reflexes: Reflexes normal.       Extremities: Pedal pulses No Edema     ASSESSMENT/PLAN  Kimberly was seen today for new patient.    Diagnoses and all orders for this visit:    Marijuana abuse,advised to quit     Anxiety and depression, will start Zoloft 50m mg qd and monitor     Other orders  -     sertraline (ZOLOFT) 50 MG tablet; Take 1 tablet by mouth daily        Outpatient Encounter Medications as of 1/10/2024   Medication Sig Dispense Refill    sertraline (ZOLOFT) 50 MG tablet Take 1 tablet by mouth daily 30 tablet 0    [DISCONTINUED] lisdexamfetamine (VYVANSE) 30 MG capsule Take 1 capsule by mouth every morning. Max Daily Amount: 30 mg (Patient not taking: Reported on 1/10/2024)      [DISCONTINUED] zolpidem (AMBIEN) 5 MG tablet Take 1 tablet by mouth nightly as needed for Sleep. Max Daily Amount: 5 mg (Patient not taking: Reported on 1/10/2024)      [DISCONTINUED] methylPREDNISolone (MEDROL, CARLOS,) 4 MG tablet Take by mouth as instructed on kit. (Patient not taking: Reported on 11/10/2023) 1 kit 0    [DISCONTINUED] guaiFENesin-dextromethorphan (ROBITUSSIN DM) 100-10 MG/5ML syrup Take 5 mLs by mouth every 8 hours as needed for Cough (Patient not taking: Reported on 1/10/2024) 120 mL 0    [DISCONTINUED] benzonatate (TESSALON) 100 MG capsule Take 1 capsule by mouth every 8 hours as needed for Cough (Patient not taking: Reported on 11/10/2023) 15 capsule 0    [DISCONTINUED] albuterol sulfate HFA (VENTOLIN HFA) 108 (90 Base) MCG/ACT inhaler Inhale 2 puffs into the lungs 4 times daily as needed for Wheezing (Patient not taking: Reported on

## 2024-01-11 NOTE — TELEPHONE ENCOUNTER
----- Message from [facility] sent at 1/11/2024  8:46 AM EST -----  Subject: Medication Problem     Medication: sertraline (ZOLOFT) 50 MG tablet  Dosage: 1x daily  Ordering Provider:     Question/Problem: The Pt stated she saw her PCP yesterday 1/10 and they   prescribed her this medication and she had told her PCP she was on the   before but only for about 2 weeks but she stated she had remembered wrong,   she was actually on this medication for almost 2 months before and she had   a bad experience on this. It made her more sad. She stated she has been   crying a lot and feeling even more depressed since taking again. She does   not want to take this medication anymore and she is needing direction from   her PCP on what to do. Thank you.      Ph[facility]: [facility]  [place], [place] - [address]   [phone] - F [phone]    ---------------------------------------------------------------------------  --------------  CALL BACK INFO  [phone]; OK to leave message on voicemail  ---------------------------------------------------------------------------  --------------    SCRIPT ANSWERS  Relationship to Patient: Self

## 2024-01-12 RX ORDER — DULOXETIN HYDROCHLORIDE 60 MG/1
60 CAPSULE, DELAYED RELEASE ORAL DAILY
Qty: 30 CAPSULE | Refills: 3 | Status: SHIPPED | OUTPATIENT
Start: 2024-01-12

## 2024-01-18 ENCOUNTER — HOSPITAL ENCOUNTER (EMERGENCY)
Age: 29
Discharge: HOME | End: 2024-01-18
Attending: EMERGENCY MEDICINE
Payer: MEDICAID

## 2024-01-18 VITALS
RESPIRATION RATE: 16 BRPM | HEART RATE: 99 BPM | OXYGEN SATURATION: 100 % | WEIGHT: 158 LBS | SYSTOLIC BLOOD PRESSURE: 107 MMHG | BODY MASS INDEX: 25.39 KG/M2 | TEMPERATURE: 97.8 F | HEIGHT: 66 IN | DIASTOLIC BLOOD PRESSURE: 89 MMHG

## 2024-01-18 DIAGNOSIS — S81.801A WOUND OF RIGHT LOWER EXTREMITY, INITIAL ENCOUNTER: Primary | ICD-10-CM

## 2024-01-18 PROCEDURE — 6370000000 HC RX 637 (ALT 250 FOR IP): Performed by: EMERGENCY MEDICINE

## 2024-01-18 PROCEDURE — 99283 EMERGENCY DEPT VISIT LOW MDM: CPT

## 2024-01-18 RX ORDER — DOXYCYCLINE HYCLATE 100 MG
100 TABLET ORAL 2 TIMES DAILY
Qty: 20 TABLET | Refills: 0 | Status: SHIPPED | OUTPATIENT
Start: 2024-01-18 | End: 2024-01-28

## 2024-01-18 RX ORDER — BACITRACIN ZINC AND POLYMYXIN B SULFATE 500; 1000 [USP'U]/G; [USP'U]/G
OINTMENT TOPICAL
Qty: 15 G | Refills: 1 | Status: SHIPPED | OUTPATIENT
Start: 2024-01-18 | End: 2024-01-25

## 2024-01-18 RX ORDER — DOXYCYCLINE HYCLATE 100 MG/1
100 CAPSULE ORAL ONCE
Status: COMPLETED | OUTPATIENT
Start: 2024-01-18 | End: 2024-01-18

## 2024-01-18 RX ADMIN — DOXYCYCLINE HYCLATE 100 MG: 100 CAPSULE ORAL at 05:07

## 2024-01-18 ASSESSMENT — PAIN DESCRIPTION - LOCATION: LOCATION: ANKLE

## 2024-01-18 ASSESSMENT — PAIN SCALES - GENERAL: PAINLEVEL_OUTOF10: 4

## 2024-01-18 NOTE — ED PROVIDER NOTES
[facility] EMERGENCY DEPARTMENT  EMERGENCY DEPARTMENT ENCOUNTER      Pt Name: Kimberly Manzo  MRN: [id number]  Birthdate 1995  Date of evaluation: 1/18/2024  Provider: John Garrett DO  PCP: Reddy Smith MD  Note Started: 5:03 AM EST 1/18/24    CHIEF COMPLAINT       Chief Complaint   Patient presents with    Wound Check     RLE, states \"I think something bit me and I need to get seen because I think it is infected because it hurts a lot.\"       HISTORY OF PRESENT ILLNESS: 1 or more Elements   History From: Patient  Limitations to history : None    Kimberly Manzo is a 28 y.o. female who presents to the ED for evaluation a wound. Patient states that she scrapped her right lower leg while shoveling snow one week ago. She states that she took her sock off and believes that she was bit by a spider. She states that this area scabbed up. She states that she then later on in the week picked the scab off. She states that she has been having some clear drainage from the site. She has no fevers or chills. She has no other reported mitigating or worsening factors.    Nursing Notes were all reviewed and agreed with or any disagreements were addressed in the HPI.    REVIEW OF SYSTEMS :    Positives and Pertinent negatives as per HPI.     SURGICAL HISTORY     Past Surgical History:   Procedure Laterality Date    TYMPANOSTOMY TUBE PLACEMENT      WISDOM TOOTH EXTRACTION         CURRENTMEDICATIONS       Previous Medications    DULOXETINE (CYMBALTA) 60 MG EXTENDED RELEASE CAPSULE    Take 1 capsule by mouth daily    MELATONIN 3 MG TABS TABLET    Take 2 tablets by mouth nightly as needed (sleep)    SERTRALINE (ZOLOFT) 50 MG TABLET    Take 1 tablet by mouth daily       ALLERGIES     Dust mite extract, Pollen extract, and Adhesive tape    FAMILYHISTORY       Family History   Family history unknown: Yes        SOCIAL HISTORY       Social History     Tobacco Use    Smoking status: Former

## 2024-01-22 ENCOUNTER — TELEPHONE (OUTPATIENT)
Dept: PRIMARY CARE CLINIC | Age: 29
End: 2024-01-22

## 2024-01-22 NOTE — TELEPHONE ENCOUNTER
Patient was in the ER for a spider bite and was prescribed doxycycline hyclate (VIBRAMYCIN) capsule 100 mg states it is making her very tired and would like for Dr. Urbano Avendano to change it to cephALEXin (KEFLEX) 500 MG capsule as she has taken this before and it does not make her tired.     Also wanted to let Dr. Urbano Avendano know that she is not taking the DULoxetine (CYMBALTA) 60 MG extended release capsule. She would like a call back at [phone] to let her know if something else is going to be sent into [facility] in [place]. Thank you.

## 2024-01-23 ENCOUNTER — TELEPHONE (OUTPATIENT)
Dept: PRIMARY CARE CLINIC | Age: 29
End: 2024-01-23

## 2024-01-23 NOTE — TELEPHONE ENCOUNTER
Patient called in again about the medication change     Patient was in the ER for a spider bite and was prescribed doxycycline hyclate (VIBRAMYCIN) capsule 100 mg states it is making her very tired and would like for Dr. Urbano Avendano to change it to cephALEXin (KEFLEX) 500 MG capsule as she has taken this before and it does not make her tired.

## 2024-01-24 ENCOUNTER — OFFICE VISIT (OUTPATIENT)
Dept: PRIMARY CARE CLINIC | Age: 29
End: 2024-01-24

## 2024-01-24 VITALS
RESPIRATION RATE: 16 BRPM | BODY MASS INDEX: 26.2 KG/M2 | WEIGHT: 163 LBS | HEART RATE: 87 BPM | TEMPERATURE: 97.8 F | HEIGHT: 66 IN | DIASTOLIC BLOOD PRESSURE: 70 MMHG | SYSTOLIC BLOOD PRESSURE: 110 MMHG | OXYGEN SATURATION: 99 %

## 2024-01-24 DIAGNOSIS — S90.561S: Primary | ICD-10-CM

## 2024-01-24 DIAGNOSIS — F32.A ANXIETY AND DEPRESSION: ICD-10-CM

## 2024-01-24 DIAGNOSIS — F41.9 ANXIETY AND DEPRESSION: ICD-10-CM
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
## 2024-01-24 RX ORDER — CEPHALEXIN 500 MG/1
500 CAPSULE ORAL 4 TIMES DAILY
Qty: 28 CAPSULE | Refills: 0 | Status: SHIPPED | OUTPATIENT
Start: 2024-01-24 | End: 2024-01-31

## 2024-01-24 ASSESSMENT — ENCOUNTER SYMPTOMS
COUGH: 0
DIARRHEA: 0
VOMITING: 0
ABDOMINAL PAIN: 0
NAUSEA: 0
SHORTNESS OF BREATH: 0

## 2024-01-24 NOTE — PROGRESS NOTES
SUBJECTIVE  Kimberly Manzo is a 28 y.o. female established was seen In the office  for evaluation.    HPI/Chief C/O:  Chief Complaint   Patient presents with    Insect Bite     Patient here for a spider bit on her right foot, no pain, no drainage     Discuss Medications     Would like to talk about her medication    Was in ER given doxy. Unable to take   Allergies   Allergen Reactions    Dust Mite Extract     Pollen Extract     Adhesive Tape Rash       ROS:  Review of Systems   Respiratory:  Negative for cough and shortness of breath.         Negative for Hemoptysis   Cardiovascular:  Negative for chest pain.   Gastrointestinal:  Negative for abdominal pain, diarrhea, nausea and vomiting.   Endocrine: Negative for polydipsia, polyphagia and polyuria.   Genitourinary:  Negative for dysuria and hematuria.   Skin:  Negative for rash.        Lesion right ankle    Neurological:  Negative for tremors and seizures.        Past Medical/Surgical Hx;  Reviewed with patient      Diagnosis Date    ADHD (attention deficit hyperactivity disorder)     Bipolar 1 disorder (HCC)     Pneumonia     PTSD (post-traumatic stress disorder)      Past Surgical History:   Procedure Laterality Date    TYMPANOSTOMY TUBE PLACEMENT      WISDOM TOOTH EXTRACTION         Past Family Hx:  Reviewed with patient      Family history unknown: Yes       Social Hx:  Reviewed with patient  Social History     Tobacco Use    Smoking status: Former     Current packs/day: 0.10     Types: Cigarettes    Smokeless tobacco: Never   Substance Use Topics    Alcohol use: No     Comment: rare       OBJECTIVE  /70   Pulse 87   Temp 97.8 °F (36.6 °C)   Resp 16   Ht 1.676 m (5' 6\")   Wt 73.9 kg (163 lb)   SpO2 99%   BMI 26.31 kg/m²     Problem List:  Kimberly does not have any pertinent problems on file.    PHYS EX:  Physical Exam  Eyes:      Extraocular Movements: Extraocular movements intact.      Pupils: Pupils are equal, round, and reactive to light.

## 2024-02-07 ENCOUNTER — OFFICE VISIT (OUTPATIENT)
Dept: PRIMARY CARE CLINIC | Age: 29
End: 2024-02-07
Payer: MEDICAID

## 2024-02-07 VITALS
WEIGHT: 164 LBS | SYSTOLIC BLOOD PRESSURE: 110 MMHG | HEART RATE: 78 BPM | BODY MASS INDEX: 26.36 KG/M2 | TEMPERATURE: 97.6 F | RESPIRATION RATE: 16 BRPM | DIASTOLIC BLOOD PRESSURE: 72 MMHG | HEIGHT: 66 IN | OXYGEN SATURATION: 98 %

## 2024-02-07 DIAGNOSIS — F32.A ANXIETY AND DEPRESSION: Primary | ICD-10-CM

## 2024-02-07 DIAGNOSIS — F12.10 MARIJUANA ABUSE: ICD-10-CM

## 2024-02-07 DIAGNOSIS — F41.9 ANXIETY AND DEPRESSION: Primary | ICD-10-CM

## 2024-02-07 PROCEDURE — G8419 CALC BMI OUT NRM PARAM NOF/U: HCPCS | Performed by: INTERNAL MEDICINE

## 2024-02-07 PROCEDURE — G8427 DOCREV CUR MEDS BY ELIG CLIN: HCPCS | Performed by: INTERNAL MEDICINE

## 2024-02-07 PROCEDURE — G8484 FLU IMMUNIZE NO ADMIN: HCPCS | Performed by: INTERNAL MEDICINE

## 2024-02-07 PROCEDURE — 99213 OFFICE O/P EST LOW 20 MIN: CPT | Performed by: INTERNAL MEDICINE

## 2024-02-07 PROCEDURE — 1036F TOBACCO NON-USER: CPT | Performed by: INTERNAL MEDICINE

## 2024-02-07 ASSESSMENT — ENCOUNTER SYMPTOMS
NAUSEA: 0
COUGH: 0
SHORTNESS OF BREATH: 0
VOMITING: 0
ABDOMINAL PAIN: 0
DIARRHEA: 0

## 2024-02-07 NOTE — PROGRESS NOTES
JVD.   Cardiovascular:      Heart sounds: No murmur heard.     No gallop.   Pulmonary:      Effort: Pulmonary effort is normal.      Breath sounds: Normal breath sounds.   Abdominal:      Palpations: There is no hepatomegaly or splenomegaly.      Tenderness: There is no abdominal tenderness.   Skin:     Coloration: Skin is not cyanotic.      Findings: No bruising or rash.      Nails: There is no clubbing.   Neurological:      Deep Tendon Reflexes: Reflexes normal.       Extremities: Pedal pulses No Edema     ASSESSMENT/PLAN  Kimberly was seen today for medication check.    Diagnoses and all orders for this visit:    Anxiety and depression,declining medication     Marijuana abuse,advised to quit     Amenorrhea to R/O pregnancy declined testing ,to follow with OBGYN     Outpatient Encounter Medications as of 2/7/2024   Medication Sig Dispense Refill    [DISCONTINUED] DULoxetine (CYMBALTA) 60 MG extended release capsule Take 1 capsule by mouth daily (Patient not taking: Reported on 1/24/2024) 30 capsule 3    [DISCONTINUED] sertraline (ZOLOFT) 50 MG tablet Take 1 tablet by mouth daily (Patient not taking: Reported on 1/24/2024) 30 tablet 0    melatonin 3 MG TABS tablet Take 2 tablets by mouth nightly as needed (sleep) 60 tablet 0    [DISCONTINUED] buPROPion (WELLBUTRIN XL) 300 MG extended release tablet Take 300 mg by mouth every morning       No facility-administered encounter medications on file as of 2/7/2024.       Return if symptoms worsen or fail to improve.        Reviewed recent labs related to Kimberly's current problems      Discussed importance of regular Health Maintenance follow up  Health Maintenance   Topic    Hepatitis B vaccine (1 of 3 - 3-dose series)    COVID-19 Vaccine (1)    Varicella vaccine (1 of 2 - 2-dose childhood series)    HIV screen     Hepatitis C screen     Pap smear     Flu vaccine (1)    Depression Monitoring     DTaP/Tdap/Td vaccine (4 - Td or Tdap)    Hepatitis A vaccine     Hib vaccine

## 2024-03-21 PROBLEM — O44.20 MARGINAL PLACENTA PREVIA: Status: ACTIVE | Noted: 2024-03-21

## 2024-03-21 PROBLEM — O09.30 LATE PRENATAL CARE: Status: ACTIVE | Noted: 2024-03-21

## 2024-04-09 ENCOUNTER — ANCILLARY PROCEDURE (OUTPATIENT)
Dept: OBGYN CLINIC | Age: 29
End: 2024-04-09
Payer: MEDICAID

## 2024-04-09 ENCOUNTER — INITIAL PRENATAL (OUTPATIENT)
Dept: OBGYN CLINIC | Age: 29
End: 2024-04-09
Payer: MEDICAID

## 2024-04-09 VITALS
WEIGHT: 174.6 LBS | BODY MASS INDEX: 28.18 KG/M2 | HEART RATE: 76 BPM | DIASTOLIC BLOOD PRESSURE: 76 MMHG | SYSTOLIC BLOOD PRESSURE: 118 MMHG

## 2024-04-09 DIAGNOSIS — F12.10 MARIJUANA ABUSE: ICD-10-CM

## 2024-04-09 DIAGNOSIS — O44.20 MARGINAL PLACENTA PREVIA: ICD-10-CM

## 2024-04-09 DIAGNOSIS — O44.42 LOW LYING PLACENTA NOS OR WITHOUT HEMORRHAGE, SECOND TRIMESTER: ICD-10-CM

## 2024-04-09 DIAGNOSIS — Z03.75 SUSPECTED SHORTENING OF CERVIX NOT FOUND: ICD-10-CM

## 2024-04-09 DIAGNOSIS — Z3A.22 22 WEEKS GESTATION OF PREGNANCY: ICD-10-CM

## 2024-04-09 DIAGNOSIS — O28.5 ABNORMAL CHROMOSOMAL AND GENETIC FINDING ON ANTENATAL SCREENING MOTHER: Primary | ICD-10-CM

## 2024-04-09 DIAGNOSIS — O28.5 ABNORMAL GENETIC TEST IN PREGNANCY: ICD-10-CM

## 2024-04-09 DIAGNOSIS — O09.30 LATE PRENATAL CARE: ICD-10-CM

## 2024-04-09 LAB
GLUCOSE URINE, POC: NEGATIVE
PROTEIN UA: NEGATIVE

## 2024-04-09 PROCEDURE — H1000 PRENATAL CARE ATRISK ASSESSM: HCPCS | Performed by: OBSTETRICS & GYNECOLOGY

## 2024-04-09 PROCEDURE — 99205 OFFICE O/P NEW HI 60 MIN: CPT | Performed by: OBSTETRICS & GYNECOLOGY

## 2024-04-09 PROCEDURE — 99203 OFFICE O/P NEW LOW 30 MIN: CPT | Performed by: OBSTETRICS & GYNECOLOGY

## 2024-04-09 PROCEDURE — 76817 TRANSVAGINAL US OBSTETRIC: CPT | Performed by: OBSTETRICS & GYNECOLOGY

## 2024-04-09 PROCEDURE — 81002 URINALYSIS NONAUTO W/O SCOPE: CPT | Performed by: OBSTETRICS & GYNECOLOGY

## 2024-04-09 PROCEDURE — 1036F TOBACCO NON-USER: CPT | Performed by: OBSTETRICS & GYNECOLOGY

## 2024-04-09 PROCEDURE — 76811 OB US DETAILED SNGL FETUS: CPT | Performed by: OBSTETRICS & GYNECOLOGY

## 2024-04-09 PROCEDURE — G8427 DOCREV CUR MEDS BY ELIG CLIN: HCPCS | Performed by: OBSTETRICS & GYNECOLOGY

## 2024-04-09 PROCEDURE — G8419 CALC BMI OUT NRM PARAM NOF/U: HCPCS | Performed by: OBSTETRICS & GYNECOLOGY

## 2024-04-09 NOTE — PROGRESS NOTES
Kimberly Manzo presents to office today for US.  Patient denies bleeding, LOF, or cramping.   Positive fetal movement.     
Praf completed for 2nd trimester  
I advised the patient that NIPT is a screening genetic test for fetal aneuploidy for the chromosomes evaluated including chromosomes 13, 18, 21, X, and Y.  I advised the patient that screening genetic testing is used to assess fetal risk for aneuploidy for the chromosomes evaluated.  Diagnostic genetic testing would be necessary to confirm the fetal karyotype being normal or abnormal.  Screening genetic testing does not replace diagnostic genetic testing. Understanding her increased risk for having a fetus with trisomy 13 based on the NIPT results, and understanding the limitations of fetal ultrasound assessment, the patient declined diagnostic genetic testing.  I reviewed with the patient the results of the fetal ultrasound evaluation performed today including the limitations of the testing.  I discussed with her my recommendation for a fetal echocardiogram.  I answered all the patient's questions to her satisfaction.  I also answered questions from her partner posed at the time of the patient's assessment.  I asked the patient to call if she had any additional questions.       If you have any questions regarding her management, please contact me at your convenience and thank you for allowing me to participate in her care.    Sincerely,        Charles Wu MD, MS, FACOG, FACS, RDCS, RDMS, RVT  Director Maternal-Fetal Medicine  [facility]  [phone]

## 2024-04-09 NOTE — PATIENT INSTRUCTIONS
Patient Education        Weeks 22 to 26 of Your Pregnancy: Care Instructions  Your baby's lungs are getting ready for breathing. Your baby may respond to your voice. Your baby likely turns less, and kicks or jerks more. Jerking may mean that your baby has hiccups.    Think about taking childbirth classes. And start to think about whether you want to have pain medicine during labor.    At your next doctor visit, you may be tested for anemia and for high blood sugar that first occurs during pregnancy (gestational diabetes). These conditions can cause problems for you and your baby.    To ease discomfort, such as back pain    Change your position often. Try not to sit or stand for too long.  Get some exercise. Things like walking or stretching may help.  Try using a heating pad or cold pack.    To ease or reduce swelling in your feet, ankles, hands, and fingers    Take off your rings.  Avoid high-sodium foods, such as potato chips.  Prop up your feet, and sleep with pillows under your feet.  Try to avoid standing for long periods of time.  Do not wear tight shoes.  Wear support stockings.  Kegel exercises to prevent urine from leaking    Squeeze your muscles as if you were trying not to pass gas. Your belly, legs, and buttocks shouldn't move. Hold the squeeze for 3 seconds, then relax for 5 to 10 seconds.    Add 1 second each week until you can squeeze for 10 seconds. Repeat the exercise 10 times a session. Do 3 to 8 sessions a day. If these exercises cause you pain, stop doing them and talk with your doctor.  Follow-up care is a key part of your treatment and safety. Be sure to make and go to all appointments, and call your doctor if you are having problems. It's also a good idea to know your test results and keep a list of the medicines you take.  Where can you learn more?  Go to https://www.healthwise.net/patientEd and enter G264 to learn more about \"Weeks 22 to 26 of Your Pregnancy: Care Instructions.\"  Current as

## 2024-05-07 ENCOUNTER — ROUTINE PRENATAL (OUTPATIENT)
Dept: OBGYN CLINIC | Age: 29
End: 2024-05-07
Payer: MEDICAID

## 2024-05-07 ENCOUNTER — ANCILLARY PROCEDURE (OUTPATIENT)
Dept: OBGYN CLINIC | Age: 29
End: 2024-05-07
Payer: MEDICAID

## 2024-05-07 VITALS
SYSTOLIC BLOOD PRESSURE: 120 MMHG | BODY MASS INDEX: 29.68 KG/M2 | DIASTOLIC BLOOD PRESSURE: 80 MMHG | WEIGHT: 183.9 LBS | HEART RATE: 100 BPM

## 2024-05-07 DIAGNOSIS — O35.HXX0 SHORT FEMUR OF FETUS ON PRENATAL ULTRASOUND: ICD-10-CM

## 2024-05-07 DIAGNOSIS — Z3A.26 26 WEEKS GESTATION OF PREGNANCY: Primary | ICD-10-CM

## 2024-05-07 DIAGNOSIS — O28.5 ABNORMAL CHROMOSOMAL AND GENETIC FINDING ON ANTENATAL SCREENING MOTHER: ICD-10-CM

## 2024-05-07 DIAGNOSIS — O28.5 ABNORMAL GENETIC TEST IN PREGNANCY: ICD-10-CM

## 2024-05-07 DIAGNOSIS — O44.20 MARGINAL PLACENTA PREVIA: ICD-10-CM

## 2024-05-07 DIAGNOSIS — O09.30 LATE PRENATAL CARE: ICD-10-CM

## 2024-05-07 DIAGNOSIS — F12.10 MARIJUANA ABUSE: ICD-10-CM

## 2024-05-07 LAB
GLUCOSE URINE, POC: NEGATIVE
PROTEIN UA: NEGATIVE

## 2024-05-07 PROCEDURE — 99213 OFFICE O/P EST LOW 20 MIN: CPT | Performed by: OBSTETRICS & GYNECOLOGY

## 2024-05-07 PROCEDURE — 1036F TOBACCO NON-USER: CPT | Performed by: OBSTETRICS & GYNECOLOGY

## 2024-05-07 PROCEDURE — 76805 OB US >/= 14 WKS SNGL FETUS: CPT | Performed by: OBSTETRICS & GYNECOLOGY

## 2024-05-07 PROCEDURE — 81002 URINALYSIS NONAUTO W/O SCOPE: CPT | Performed by: OBSTETRICS & GYNECOLOGY

## 2024-05-07 PROCEDURE — 76817 TRANSVAGINAL US OBSTETRIC: CPT | Performed by: OBSTETRICS & GYNECOLOGY

## 2024-05-07 PROCEDURE — 99214 OFFICE O/P EST MOD 30 MIN: CPT | Performed by: OBSTETRICS & GYNECOLOGY

## 2024-05-07 PROCEDURE — G8419 CALC BMI OUT NRM PARAM NOF/U: HCPCS | Performed by: OBSTETRICS & GYNECOLOGY

## 2024-05-07 PROCEDURE — G8427 DOCREV CUR MEDS BY ELIG CLIN: HCPCS | Performed by: OBSTETRICS & GYNECOLOGY

## 2024-05-07 NOTE — PROGRESS NOTES
Kimberly Manzo presents to office today for US.   Patient denies bleeding, LOF, or cramping.   Positive fetal movement.

## 2024-05-07 NOTE — PATIENT INSTRUCTIONS
signs at 37 weeks or more  If you have signs of labor at 37 weeks or more, your doctor may tell you to call when your labor becomes more active. Symptoms of active labor include:  Contractions that are regular.  Contractions that are less than 5 minutes apart.  Contractions that are hard to talk through.  Follow-up care is a key part of your treatment and safety. Be sure to make and go to all appointments, and call your doctor if you are having problems. It's also a good idea to know your test results and keep a list of the medicines you take.  Where can you learn more?  Go to https://www.[employer].net/patientEd and enter N531 to learn more about \"Learning About When to Call Your Doctor During Pregnancy (After 20 Weeks).\"  Current as of: July 10, 2023               Content Version: 14.0  © 2006-2024 [employer].   Care instructions adapted under license by [employer]. If you have questions about a medical condition or this instruction, always ask your healthcare professional. Healthwise, [employer] disclaims any warranty or liability for your use of this information.

## 2024-05-07 NOTE — PROGRESS NOTES
24    Jessica Castillo, DO  [address] [place] [address]     RE:  LUIS A MCFARLAND  : 1995   AGE: 28 y.o.    This report has been created using voice recognition software. It may contain errors which are inherent in voice recognition technology.    Dear Dr. Castillo:    Luis A Mcfarland had an appointment today for the following indications:    Patient Active Problem List   Diagnosis    Marijuana use in the first trimester    Low-lying placenta, resolved    Late prenatal care    Abnormal chromosomal and genetic finding on  screening mother     Luis A Mcfarland is a 28 y.o. female, who is G1(0). She has an Estimated Date of Delivery: 2024 based on her established dates.  She is currently 26 weeks 5 days gestation based on that assessment.     The patient was initially referred for evaluation secondary to an increased risk for trisomy 13 based on her [facility] NIPT.  Her posttest risk for trisomy 13 was 68%.  I discussed the findings with the patient and her partner.  I advised them that trisomy 13 is a genetic condition that may have life-threatening birth defects associated with the disorder and severe intellectual disabilities.  Most babies born with trisomy 13 survive days to weeks following delivery.  Approximately 5 to 10% of babies born with this disorder survived for 1 year or more.  I advised them that the NIPT is a screening and not a diagnostic genetic test.  In order to confirm the fetal karyotype, diagnostic genetic testing would be required.  Diagnostic genetic testing would involve a genetic amniocentesis.  Amniocentesis is associated with, but not limited to, an increased risk for rupture of the amniotic membranes, bleeding, infection, injury to the fetus, and the potential for the loss of the pregnancy.  The patient declined diagnostic genetic testing understand the limitations of NIPT.    The patient stated that she used marijuana until she discovered

## 2024-05-31 PROBLEM — F31.2 SEVERE MANIC BIPOLAR 1 DISORDER WITH PSYCHOTIC BEHAVIOR (HCC): Status: RESOLVED | Noted: 2022-08-02 | Resolved: 2024-05-31

## 2024-05-31 PROBLEM — F12.10 MARIJUANA ABUSE: Status: RESOLVED | Noted: 2022-08-02 | Resolved: 2024-05-31

## 2024-06-06 ENCOUNTER — ANCILLARY PROCEDURE (OUTPATIENT)
Dept: OBGYN CLINIC | Age: 29
End: 2024-06-06
Payer: MEDICAID

## 2024-06-06 ENCOUNTER — ROUTINE PRENATAL (OUTPATIENT)
Dept: OBGYN CLINIC | Age: 29
End: 2024-06-06
Payer: MEDICAID

## 2024-06-06 VITALS
SYSTOLIC BLOOD PRESSURE: 123 MMHG | DIASTOLIC BLOOD PRESSURE: 88 MMHG | BODY MASS INDEX: 30.99 KG/M2 | WEIGHT: 192 LBS | HEART RATE: 94 BPM

## 2024-06-06 DIAGNOSIS — O28.5 ABNORMAL GENETIC TEST IN PREGNANCY: Primary | ICD-10-CM

## 2024-06-06 DIAGNOSIS — F12.10 MARIJUANA ABUSE: ICD-10-CM

## 2024-06-06 DIAGNOSIS — O09.30 LATE PRENATAL CARE: ICD-10-CM

## 2024-06-06 DIAGNOSIS — O28.5 ABNORMAL CHROMOSOMAL AND GENETIC FINDING ON ANTENATAL SCREENING MOTHER: ICD-10-CM

## 2024-06-06 DIAGNOSIS — O35.HXX0 SHORT FEMUR OF FETUS ON PRENATAL ULTRASOUND: ICD-10-CM

## 2024-06-06 LAB
GLUCOSE URINE, POC: NEGATIVE
PROTEIN UA: NEGATIVE

## 2024-06-06 PROCEDURE — 81002 URINALYSIS NONAUTO W/O SCOPE: CPT | Performed by: OBSTETRICS & GYNECOLOGY

## 2024-06-06 PROCEDURE — 99999 PR OFFICE/OUTPT VISIT,PROCEDURE ONLY: CPT | Performed by: OBSTETRICS & GYNECOLOGY

## 2024-06-06 PROCEDURE — 76820 UMBILICAL ARTERY ECHO: CPT | Performed by: OBSTETRICS & GYNECOLOGY

## 2024-06-06 PROCEDURE — 76805 OB US >/= 14 WKS SNGL FETUS: CPT | Performed by: OBSTETRICS & GYNECOLOGY

## 2024-06-06 PROCEDURE — H1000 PRENATAL CARE ATRISK ASSESSM: HCPCS | Performed by: OBSTETRICS & GYNECOLOGY

## 2024-06-06 PROCEDURE — 76818 FETAL BIOPHYS PROFILE W/NST: CPT | Performed by: OBSTETRICS & GYNECOLOGY

## 2024-06-06 PROCEDURE — 99213 OFFICE O/P EST LOW 20 MIN: CPT | Performed by: OBSTETRICS & GYNECOLOGY

## 2024-06-06 NOTE — PATIENT INSTRUCTIONS
Patient Education        Counting Your Baby's Kicks: Care Instructions  Overview     Counting your baby's kicks is one way your doctor can tell that your baby is healthy. You will probably feel your baby move for the first time between 16 and 22 weeks. The movement may feel like flutters rather than kicks. Your baby may move more at certain times of the day. When you are active, you may notice less kicking than when you are resting. At your prenatal visits, your doctor will ask whether the baby is active.  In your last trimester, your doctor may ask you to count the number of times you feel your baby move.  Follow-up care is a key part of your treatment and safety. Be sure to make and go to all appointments, and call your doctor if you are having problems. It's also a good idea to know your test results and keep a list of the medicines you take.  How do you count fetal kicks?  A common method of checking your baby's movement is to note the length of time it takes to count 10 movements (such as kicks, flutters, or rolls).  Pick your baby's most active time of day to count. This may be any time from morning to evening.  If you don't feel 10 movements in an hour, have something to eat or drink and count for another hour. If you don't feel at least 10 movements in the 2-hour period, call your doctor.  Do not use an at-home Doppler heart monitor in place of counting fetal movements.  When should you call for help?   Call your doctor now or seek immediate medical care if:    You feel fewer than 10 movements in a 2-hour period.     You noticed that your baby has stopped moving or is moving less than normal.   Watch closely for changes in your health, and be sure to contact your doctor if you have any problems.  Where can you learn more?  Go to https://www.healthwise.net/patientEd and enter U048 to learn more about \"Counting Your Baby's Kicks: Care Instructions.\"  Current as of: July 10, 2023  Content Version: 14.1  ©

## 2024-06-06 NOTE — PROGRESS NOTES
COPD/PN Week 1 Survey      Responses   Facility patient discharged from?  [facility]   Does the patient have one of the following disease processes/diagnoses(primary or secondary)?  COPD/Pneumonia   Is there a successful TCM telephone encounter documented?  No   Was the primary reason for admission:  Pneumonia   Week 1 attempt successful?  Yes   Call start time  1727   Revoke  Decline to participate [She does not want us calling.]   Call end time  1729   Discharge diagnosis  PNA          Frida Griffiths RN  
Kimberly Manzo presents to office today for US/NST.  Patient denies bleeding, LOF, or cramping.   Positive fetal movement.     
PRAF form completed for 3rd trimester.  
increased vaginal discharge, vaginal bleeding, contractions, abdominal pain, back pain or any new significant symptomatology prior to her next visit. I advised her that these are signs and symptoms of cervical change and require follow-up assessment when they occur.  The patient is to remain sexually abstinent at this time secondary to the finding of a low-lying placenta.    I requested the patient return for a follow-up assessment in 1 week unless there is a clinical reason for her to return prior to that time. She is to call if she has any problems or questions prior to her next visit.     Further evaluation and management will be dependent on her clinical presentation and the results of her testing.     The patient is to continue to follow with you in your office for ongoing obstetric care.    If you have any questions regarding her management, please contact me at your convenience and thank you for allowing me to participate in her care.    Sincerely,        Charles Wu MD, MS, FACOG, FACS, RDCS, RDMS, RVT  Director Maternal-Fetal Medicine  [facility]  [phone]

## 2024-07-03 ENCOUNTER — ROUTINE PRENATAL (OUTPATIENT)
Dept: OBGYN CLINIC | Age: 29
End: 2024-07-03
Payer: MEDICAID

## 2024-07-03 ENCOUNTER — ANCILLARY PROCEDURE (OUTPATIENT)
Dept: OBGYN CLINIC | Age: 29
End: 2024-07-03
Payer: MEDICAID

## 2024-07-03 VITALS
HEART RATE: 97 BPM | DIASTOLIC BLOOD PRESSURE: 72 MMHG | SYSTOLIC BLOOD PRESSURE: 110 MMHG | BODY MASS INDEX: 32.44 KG/M2 | WEIGHT: 201 LBS

## 2024-07-03 DIAGNOSIS — O09.30 LATE PRENATAL CARE: ICD-10-CM

## 2024-07-03 DIAGNOSIS — Z3A.34 34 WEEKS GESTATION OF PREGNANCY: ICD-10-CM

## 2024-07-03 DIAGNOSIS — O28.5 ABNORMAL CHROMOSOMAL AND GENETIC FINDING ON ANTENATAL SCREENING MOTHER: Primary | ICD-10-CM

## 2024-07-03 LAB
GLUCOSE URINE, POC: NORMAL
PROTEIN UA: NEGATIVE

## 2024-07-03 PROCEDURE — 76816 OB US FOLLOW-UP PER FETUS: CPT | Performed by: OBSTETRICS & GYNECOLOGY

## 2024-07-03 PROCEDURE — 76818 FETAL BIOPHYS PROFILE W/NST: CPT | Performed by: OBSTETRICS & GYNECOLOGY

## 2024-07-03 PROCEDURE — 99213 OFFICE O/P EST LOW 20 MIN: CPT

## 2024-07-03 PROCEDURE — 81002 URINALYSIS NONAUTO W/O SCOPE: CPT | Performed by: OBSTETRICS & GYNECOLOGY

## 2024-07-03 PROCEDURE — 76820 UMBILICAL ARTERY ECHO: CPT | Performed by: OBSTETRICS & GYNECOLOGY

## 2024-07-03 PROCEDURE — 99999 PR OFFICE/OUTPT VISIT,PROCEDURE ONLY: CPT | Performed by: OBSTETRICS & GYNECOLOGY

## 2024-07-03 NOTE — PROGRESS NOTES
Positive fetal movement  No leaking of fluid, vaginal bleeding, or contractions.     
      Allergies   Allergen Reactions    Dust Mite Extract     Pollen Extract     Adhesive Tape Rash    Clarithromycin Nausea And Vomiting    Prednisone Rash       Current Outpatient Medications:     Prenatal Vit-Fe Fumarate-FA (PRENATAL PLUS) 27-1 MG TABS, Take 1 tablet by mouth daily, Disp: 30 tablet, Rfl: 11    Social History     Tobacco Use    Smoking status: Former     Types: E-Cigarettes    Smokeless tobacco: Never    Tobacco comments:     Havent smoked cigarettes in over 7 years, quit vaping as soon as i found out i was pregnant.   Substance Use Topics    Alcohol use: No     Comment: rare     FAMILY MEDICAL HISTORY:   Negative for congenital abnormalities, autism, genetic disease and mental retardation, not listed above.     Review of Systems :   CONSTITUTIONAL : No fever, no chills   HEENT : No headache, no visual changes, no rhinorrhea, no sore throat   CARDIOVASCULAR : No pain, no palpitations, no edema   RESPIRATORY : No pain, no shortness of breath   GASTROINTESTINAL : No N/V, no D/C, no abdominal pain   GENITOURINARY : No dysuria, hematuria and no incontinence   MUSCULOSKELETAL : No myalgia, No back pain  NEUROLOGICAL : No numbness, no tingling, no tremors. No history of seizures  ALL OTHER SYSTEMS WERE REPORTED AS NEGATIVE.    Vital signs:   /72   Pulse 97   Wt 91.2 kg (201 lb)   BMI 32.44 kg/m²   Urine dipstick:   Negative for Glucose    Negative for Albumin    IMPRESSION:    1.  IUP at 34 weeks 6 days Estimated Date of Delivery: 8/8/2024    2.  High risk for trisomy 13 on NIPT (68%)    3.  Declined diagnostic genetic testing understanding her increased risk for having a fetus with trisomy 13 based on NIPT.    4.  Marijuana use in the first trimester    5.  Low-lying placenta, resolved 5/7/2024     6.  Left EIF on 4/9/2024    7.  Normal cervical length on 5/7/2024    8.  Normal fetal echocardiogram 4/26/2024    9.  Cephalic presentation 7/3/2024  10.  Estimated fetal weight 2665 grams

## 2024-07-09 ENCOUNTER — ROUTINE PRENATAL (OUTPATIENT)
Dept: OBGYN CLINIC | Age: 29
End: 2024-07-09
Payer: MEDICAID

## 2024-07-09 VITALS
BODY MASS INDEX: 33.09 KG/M2 | SYSTOLIC BLOOD PRESSURE: 126 MMHG | WEIGHT: 205 LBS | DIASTOLIC BLOOD PRESSURE: 84 MMHG | HEART RATE: 90 BPM

## 2024-07-09 DIAGNOSIS — Z3A.35 35 WEEKS GESTATION OF PREGNANCY: ICD-10-CM

## 2024-07-09 DIAGNOSIS — O09.30 LATE PRENATAL CARE: ICD-10-CM

## 2024-07-09 DIAGNOSIS — O28.5 ABNORMAL CHROMOSOMAL AND GENETIC FINDING ON ANTENATAL SCREENING MOTHER: Primary | ICD-10-CM

## 2024-07-09 LAB
GLUCOSE URINE, POC: NEGATIVE
PROTEIN UA: POSITIVE

## 2024-07-09 PROCEDURE — 99213 OFFICE O/P EST LOW 20 MIN: CPT | Performed by: OBSTETRICS & GYNECOLOGY

## 2024-07-09 PROCEDURE — 59025 FETAL NON-STRESS TEST: CPT | Performed by: OBSTETRICS & GYNECOLOGY

## 2024-07-09 PROCEDURE — 81002 URINALYSIS NONAUTO W/O SCOPE: CPT | Performed by: OBSTETRICS & GYNECOLOGY

## 2024-07-09 NOTE — PROGRESS NOTES
Pt here for NST.  Pt states that she has no concerns denies bleeding/cramping/lof.  Pt states good fetal movement.  
7/3/2024  11.  Reassuring biophysical profile 2024  12.  Elevated cord Doppler SD ratios without absence or reversal of end-diastolic flow on 7/3/2024  13.  Reactive nonstress test 2024    PLAN:  I would recommend that the patient count fetal movements and call if she notices any subjective decrease in fetal movements, particularly if there are less than 10 major movements in 2 hours. Non-stress testing should be performed every 3 to 4 days through the balance of the pregnancy. Serial ultrasounds to assess fetal anatomy and growth should be performed. The patient is at increase risk for  morbidity and mortality secondary to her history. Weekly BPP and cord Doppler testing should be performed, unless there is a clinical indication to perform the testing more frequently.    The patient was advised to call if she has any increased vaginal discharge, vaginal bleeding, contractions, abdominal pain, back pain or any new significant symptomatology prior to her next visit. I advised her that these are signs and symptoms of cervical change and require follow-up assessment when they occur.  The patient is to remain sexually abstinent at this time secondary to the finding of a low-lying placenta.    I requested the patient return for a follow-up assessment in 3 days unless there is a clinical reason for her to return prior to that time. She is to call if she has any problems or questions prior to her next visit.     Further evaluation and management will be dependent on her clinical presentation and the results of her testing.     The patient is to continue to follow with you in your office for ongoing obstetric care.    If you have any questions regarding her management, please contact me at your convenience and thank you for allowing me to participate in her care.    Sincerely,        Charles Wu MD, MS, FACOG, FACS, RDCS, RDMS, RVT  Director Maternal-Fetal Medicine  [facility]  [phone]

## 2024-07-09 NOTE — PATIENT INSTRUCTIONS
Please arrive for your scheduled appointment at least 15 minutes early with your actual insurance card+ a photo ID. Also if you need any refills ordered or have questions, it may take up 48 hours to reply. Please allow ample time for your refills. Call me when you use last refill. Thank you for your cooperation. You might be having an NST at your next appt. Please eat a large snack or breakfast before coming to office. Thank youCall your primary obstetrician with bleeding, leaking of fluid, abdominal tenderness, headache, blurry vision, epigastric pain and increased urinary frequency.If you are experiencing an emergency and need immediate help, call 911 or go to go emergency room or labor and delivery. Do kick counts after dinner. Call your primary obstetrician if less than 10 kicks in 2 hours after dinner.     Call your primary obstetrician with bleeding, leaking of fluid, abdominal tenderness, headache, blurry vision, epigastric pain and increased urinary frequency.if you are sick, not feeling well or have an infectious process going on please reschedule your appointment by calling [phone]. Also if any family members are not feeling well, please do not bring them to your appointment. We appreciate your cooperation. We are doing this in order to protect our pregnant mothers+ their babies.if you are sick, not feeling well or have an infectious process going on please reschedule your appointment by calling [phone]. Also if any family members are not feeling well, please do not bring them to your appointment. We appreciate your cooperation. We are doing this in order to protect our pregnant mothers+ their babies.

## 2024-07-15 ENCOUNTER — ANCILLARY PROCEDURE (OUTPATIENT)
Dept: OBGYN CLINIC | Age: 29
End: 2024-07-15
Payer: MEDICAID

## 2024-07-15 ENCOUNTER — ROUTINE PRENATAL (OUTPATIENT)
Dept: OBGYN CLINIC | Age: 29
End: 2024-07-15
Payer: MEDICAID

## 2024-07-15 VITALS
WEIGHT: 207 LBS | BODY MASS INDEX: 33.41 KG/M2 | DIASTOLIC BLOOD PRESSURE: 85 MMHG | HEART RATE: 102 BPM | SYSTOLIC BLOOD PRESSURE: 123 MMHG

## 2024-07-15 DIAGNOSIS — Z3A.36 36 WEEKS GESTATION OF PREGNANCY: Primary | ICD-10-CM

## 2024-07-15 DIAGNOSIS — O28.5 ABNORMAL CHROMOSOMAL AND GENETIC FINDING ON ANTENATAL SCREENING MOTHER: ICD-10-CM

## 2024-07-15 DIAGNOSIS — R94.8 ABNORMAL PLACENTA FUNCTION TEST: ICD-10-CM

## 2024-07-15 LAB
GLUCOSE URINE, POC: NEGATIVE
PROTEIN UA: NEGATIVE

## 2024-07-15 PROCEDURE — 76818 FETAL BIOPHYS PROFILE W/NST: CPT | Performed by: OBSTETRICS & GYNECOLOGY

## 2024-07-15 PROCEDURE — 81002 URINALYSIS NONAUTO W/O SCOPE: CPT | Performed by: OBSTETRICS & GYNECOLOGY

## 2024-07-15 PROCEDURE — G8427 DOCREV CUR MEDS BY ELIG CLIN: HCPCS | Performed by: OBSTETRICS & GYNECOLOGY

## 2024-07-15 PROCEDURE — 99212 OFFICE O/P EST SF 10 MIN: CPT | Performed by: OBSTETRICS & GYNECOLOGY

## 2024-07-15 PROCEDURE — 76820 UMBILICAL ARTERY ECHO: CPT | Performed by: OBSTETRICS & GYNECOLOGY

## 2024-07-15 PROCEDURE — 76821 MIDDLE CEREBRAL ARTERY ECHO: CPT | Performed by: OBSTETRICS & GYNECOLOGY

## 2024-07-15 PROCEDURE — 76815 OB US LIMITED FETUS(S): CPT | Performed by: OBSTETRICS & GYNECOLOGY

## 2024-07-15 PROCEDURE — 99213 OFFICE O/P EST LOW 20 MIN: CPT | Performed by: OBSTETRICS & GYNECOLOGY

## 2024-07-15 PROCEDURE — 1036F TOBACCO NON-USER: CPT | Performed by: OBSTETRICS & GYNECOLOGY

## 2024-07-15 PROCEDURE — G8419 CALC BMI OUT NRM PARAM NOF/U: HCPCS | Performed by: OBSTETRICS & GYNECOLOGY

## 2024-07-15 NOTE — PROGRESS NOTES
July 15, 2024      Bj Lino MD  [address] [place] [address]     RE:  LUIS A MCFARLAND  : 1995   AGE: 28 y.o. fast    This report has been created using voice recognition software. It may contain errors which are inherent in voice recognition technology.    Dear Dr. Lino:      I had the pleasure of meeting with Ms. Mcfarland for a return consultation.  As you know, Ms. Mcfarland  is a 28 y.o.  at 36w4d (19 WK US) who is being followed by our office for multiple medical issues.  The patient left the office following her ultrasound.  The consultation was completed via telephone.      Today, Ms. Mcfarland reports that she feels well.  She notes good fetal movement and denies any symptoms of leaking of fluid, vaginal bleeding, and/or contractions.  She had a fetal ultrasound that was notable for the following.  There is a single intrauterine gestation in a cephalic presentation with a heart rate of 122 beats per minute.  The placenta is anterior.  The amniotic fluid index is 16.4 cm.  BPP 10/10.  Umbilical artery PI elevated.  End-diastolic flow seen.  MCA PI decreased.  MCA PSV 0.86 MOM.  CPR PI >1.      PERTINENT PHYSICAL EXAMINATION:   /85   Pulse (!) 102   Wt 93.9 kg (207 lb)   BMI 33.41 kg/m²     Urine dipstick:   Negative for Glucose    Negative for Albumin      A fetal ultrasound assessment was performed today. A report is enclosed for your review.    Assessment & Plan:  28 y.o.  at 36w4d (19 WK US) with:    1.  IUP at 36 weeks 4 days Estimated Date of Delivery: 2024   2.  High risk for trisomy 13 on NIPT (68%)   3.  Declined diagnostic genetic testing understanding her increased risk for having a fetus with trisomy 13 based on NIPT.   -- Recommend sending cord blood at delivery for diagnostic testing.   -- The patient has been added to the neonatology list.   4.  Marijuana use in the first trimester.  Counseling previously provided.   5.  Low-lying

## 2024-07-15 NOTE — PROGRESS NOTES
Kimberly Manzo presents to office today for US/NST.  Patient denies bleeding, LOF, or cramping.   Positive fetal movement.

## 2024-07-18 ENCOUNTER — ANCILLARY PROCEDURE (OUTPATIENT)
Dept: OBGYN CLINIC | Age: 29
End: 2024-07-18
Payer: MEDICAID

## 2024-07-18 ENCOUNTER — ROUTINE PRENATAL (OUTPATIENT)
Dept: OBGYN CLINIC | Age: 29
End: 2024-07-18
Payer: MEDICAID

## 2024-07-18 VITALS
DIASTOLIC BLOOD PRESSURE: 86 MMHG | WEIGHT: 208 LBS | BODY MASS INDEX: 33.57 KG/M2 | SYSTOLIC BLOOD PRESSURE: 119 MMHG | HEART RATE: 101 BPM

## 2024-07-18 DIAGNOSIS — O28.5 ABNORMAL CHROMOSOMAL AND GENETIC FINDING ON ANTENATAL SCREENING MOTHER: ICD-10-CM

## 2024-07-18 DIAGNOSIS — R80.9 URINE PROTEIN INCREASED: ICD-10-CM

## 2024-07-18 DIAGNOSIS — Z3A.37 37 WEEKS GESTATION OF PREGNANCY: Primary | ICD-10-CM

## 2024-07-18 DIAGNOSIS — F41.9 ANXIETY AND DEPRESSION: ICD-10-CM

## 2024-07-18 DIAGNOSIS — O35.HXX0 SHORT FEMUR OF FETUS ON PRENATAL ULTRASOUND: ICD-10-CM

## 2024-07-18 DIAGNOSIS — F32.A ANXIETY AND DEPRESSION: ICD-10-CM

## 2024-07-18 DIAGNOSIS — R94.8 ABNORMAL PLACENTA FUNCTION TEST: ICD-10-CM

## 2024-07-18 DIAGNOSIS — Z3A.37 37 WEEKS GESTATION OF PREGNANCY: ICD-10-CM

## 2024-07-18 LAB
CREATININE URINE: 323.5 MG/DL (ref 29–226)
GLUCOSE URINE, POC: NEGATIVE
PROTEIN UA: ABNORMAL
TOTAL PROTEIN, URINE: 25 MG/DL (ref 0–12)
URINE TOTAL PROTEIN CREATININE RATIO: 0.08 (ref 0–0.2)

## 2024-07-18 PROCEDURE — 81002 URINALYSIS NONAUTO W/O SCOPE: CPT | Performed by: OBSTETRICS & GYNECOLOGY

## 2024-07-18 PROCEDURE — 76821 MIDDLE CEREBRAL ARTERY ECHO: CPT | Performed by: OBSTETRICS & GYNECOLOGY

## 2024-07-18 PROCEDURE — 76818 FETAL BIOPHYS PROFILE W/NST: CPT | Performed by: OBSTETRICS & GYNECOLOGY

## 2024-07-18 PROCEDURE — 76816 OB US FOLLOW-UP PER FETUS: CPT | Performed by: OBSTETRICS & GYNECOLOGY

## 2024-07-18 PROCEDURE — 76820 UMBILICAL ARTERY ECHO: CPT | Performed by: OBSTETRICS & GYNECOLOGY

## 2024-07-18 PROCEDURE — 99213 OFFICE O/P EST LOW 20 MIN: CPT | Performed by: OBSTETRICS & GYNECOLOGY

## 2024-07-18 NOTE — PROGRESS NOTES
Kimberly Manzo presents to office today for BPP/NST.  Patient denies bleeding, LOF, or cramping.   Positive fetal movement.

## 2024-07-18 NOTE — PATIENT INSTRUCTIONS
are having problems. It's also a good idea to know your test results and keep a list of the medicines you take.  Where can you learn more?  Go to https://www.theBench.net/patientEd and enter N531 to learn more about \"Learning About When to Call Your Doctor During Pregnancy (After 20 Weeks).\"  Current as of: July 10, 2023  Content Version: 14.1  © 2006-2024 [employer].   Care instructions adapted under license by [employer]. If you have questions about a medical condition or this instruction, always ask your healthcare professional. [employer] disclaims any warranty or liability for your use of this information.

## 2024-07-19 LAB
AMORPHOUS: PRESENT
BACTERIA: ABNORMAL
BILIRUBIN, URINE: NEGATIVE
COLOR, UA: YELLOW
CRYSTALS, UA: ABNORMAL /HPF
GLUCOSE URINE: NEGATIVE MG/DL
KETONES, URINE: NEGATIVE MG/DL
LEUKOCYTE ESTERASE, URINE: NEGATIVE
NITRITE, URINE: NEGATIVE
PH, URINE: 6 (ref 5–9)
PROTEIN UA: ABNORMAL MG/DL
RBC UA: ABNORMAL /HPF
SPECIFIC GRAVITY UA: >1.03 (ref 1–1.03)
TURBIDITY: ABNORMAL
URINE HGB: NEGATIVE
UROBILINOGEN, URINE: 0.2 EU/DL (ref 0–1)
WBC UA: ABNORMAL /HPF

## 2024-07-21 LAB
CULTURE: NORMAL
CULTURE: NORMAL
SPECIMEN DESCRIPTION: NORMAL

## 2024-07-22 ENCOUNTER — ROUTINE PRENATAL (OUTPATIENT)
Dept: OBGYN CLINIC | Age: 29
End: 2024-07-22
Payer: MEDICAID

## 2024-07-22 ENCOUNTER — ANCILLARY PROCEDURE (OUTPATIENT)
Dept: OBGYN CLINIC | Age: 29
End: 2024-07-22
Payer: MEDICAID

## 2024-07-22 VITALS
BODY MASS INDEX: 33.86 KG/M2 | WEIGHT: 209.8 LBS | DIASTOLIC BLOOD PRESSURE: 81 MMHG | HEART RATE: 101 BPM | SYSTOLIC BLOOD PRESSURE: 125 MMHG

## 2024-07-22 DIAGNOSIS — Z34.03 PRIMIGRAVIDA IN THIRD TRIMESTER: ICD-10-CM

## 2024-07-22 DIAGNOSIS — O28.5 ABNORMAL CHROMOSOMAL AND GENETIC FINDING ON ANTENATAL SCREENING MOTHER: ICD-10-CM

## 2024-07-22 DIAGNOSIS — R80.9 URINE PROTEIN INCREASED: ICD-10-CM

## 2024-07-22 DIAGNOSIS — O09.30 LATE PRENATAL CARE: ICD-10-CM

## 2024-07-22 DIAGNOSIS — Z3A.37 37 WEEKS GESTATION OF PREGNANCY: Primary | ICD-10-CM

## 2024-07-22 LAB
GLUCOSE URINE, POC: NEGATIVE
PROTEIN UA: POSITIVE

## 2024-07-22 PROCEDURE — 76818 FETAL BIOPHYS PROFILE W/NST: CPT | Performed by: OBSTETRICS & GYNECOLOGY

## 2024-07-22 PROCEDURE — 81002 URINALYSIS NONAUTO W/O SCOPE: CPT | Performed by: OBSTETRICS & GYNECOLOGY

## 2024-07-22 PROCEDURE — 99213 OFFICE O/P EST LOW 20 MIN: CPT | Performed by: OBSTETRICS & GYNECOLOGY

## 2024-07-22 PROCEDURE — 99999 PR OFFICE/OUTPT VISIT,PROCEDURE ONLY: CPT | Performed by: OBSTETRICS & GYNECOLOGY

## 2024-07-22 NOTE — PROGRESS NOTES
Kimberly Manzo presents to office today for BPP/NST.  Patient denies bleeding, LOF, or cramping.   Positive fetal movement.   /91, denies HA, blurred vision, or epigastric pain.  
       Social History     Socioeconomic History    Marital status: Single     Spouse name: None    Number of children: 0    Years of education: 14    Highest education level: None   Occupational History     Employer:    Tobacco Use    Smoking status: Former     Types: E-Cigarettes    Smokeless tobacco: Never    Tobacco comments:     Havent smoked cigarettes in over 7 years, quit vaping as soon as i found out i was pregnant.   Vaping Use    Vaping Use: Former    Quit date: 2/28/2024    Substances: Nicotine    Devices: Disposable   Substance and Sexual Activity    Alcohol use: No     Comment: rare    Drug use: Not Currently     Types: Marijuana (Weed)     Comment: Stopped as soon as i found out i was pregnant.    Sexual activity: Not Currently     Partners: Male     Comment: Havent had intercourse in 3 weeks because of placenta.     Social Determinants of Health     Financial Resource Strain: Low Risk  (1/10/2024)    Overall Financial Resource Strain (CARDIA)     Difficulty of Paying Living Expenses: Not hard at all   Food Insecurity: No Food Insecurity (1/10/2024)    Hunger Vital Sign     Worried About Running Out of Food in the Last Year: Never true     Ran Out of Food in the Last Year: Never true   Transportation Needs: Unknown (1/10/2024)    PRAPARE - Transportation     Lack of Transportation (Non-Medical): No   Housing Stability: Unknown (1/10/2024)    Housing Stability Vital Sign     Unstable Housing in the Last Year: No          FAMILY MEDICAL HISTORY:   Family History   Family history unknown: Yes          PHYSICAL EXAMINATION:  /81   Pulse (!) 101   Wt 95.2 kg (209 lb 12.8 oz)   Body mass index is 33.86 kg/m².    Urine dipstick:  Results for POC orders placed in visit on 07/22/24   POCT urine qual dipstick protein   Result Value Ref Range    Protein, UA Positive (A) Negative   POCT urine qual dipstick glucose   Result Value Ref Range    Glucose, UA POC negative         IMPRESSION:  1. Single

## 2024-07-22 NOTE — PATIENT INSTRUCTIONS
groin. The skin may be reddish or purplish, depending on your usual skin color.  You have symptoms of preeclampsia, such as:  Sudden swelling of your face, hands, or feet.  New vision problems (such as dimness, blurring, or seeing spots).  A severe headache.  You have a sudden release of fluid from your vagina. (You think your water broke.)  You've been having regular contractions for an hour. This means that you've had at least 6 contractions within 1 hour, even after you change your position and drink fluids.  You notice that your baby has stopped moving or is moving less than normal.  You have signs of heart failure, such as:  New or increased shortness of breath.  New or worse swelling in your legs, ankles, or feet.  Sudden weight gain, such as more than 2 to 3 pounds in a day or 5 pounds in a week.  Feeling so tired or weak that you cannot do your usual activities.  You have symptoms of a urinary tract infection. These may include:  Pain or burning when you urinate.  A frequent need to urinate without being able to pass much urine.  Pain in the flank, which is just below the rib cage and above the waist on either side of the back.  Blood in your urine.  Watch closely for changes in your health, and be sure to contact your doctor if:  You have vaginal discharge that smells bad.  You feel sad, anxious, or hopeless for more than a few days.  You have skin changes, such as a rash, itching, or a yellow color to your skin.  You have other concerns about your pregnancy.  If you have labor signs at 37 weeks or more  If you have signs of labor at 37 weeks or more, your doctor may tell you to call when your labor becomes more active. Symptoms of active labor include:  Contractions that are regular.  Contractions that are less than 5 minutes apart.  Contractions that are hard to talk through.  Follow-up care is a key part of your treatment and safety. Be sure to make and go to all appointments, and call your doctor if you

## 2024-07-26 ENCOUNTER — ANCILLARY PROCEDURE (OUTPATIENT)
Dept: OBGYN CLINIC | Age: 29
End: 2024-07-26
Payer: MEDICAID

## 2024-07-26 ENCOUNTER — ROUTINE PRENATAL (OUTPATIENT)
Dept: OBGYN CLINIC | Age: 29
End: 2024-07-26
Payer: MEDICAID

## 2024-07-26 VITALS
DIASTOLIC BLOOD PRESSURE: 89 MMHG | WEIGHT: 210.5 LBS | SYSTOLIC BLOOD PRESSURE: 124 MMHG | HEART RATE: 96 BPM | BODY MASS INDEX: 33.98 KG/M2

## 2024-07-26 DIAGNOSIS — O09.30 LATE PRENATAL CARE: ICD-10-CM

## 2024-07-26 DIAGNOSIS — Z3A.38 38 WEEKS GESTATION OF PREGNANCY: Primary | ICD-10-CM

## 2024-07-26 DIAGNOSIS — O28.5 ABNORMAL CHROMOSOMAL AND GENETIC FINDING ON ANTENATAL SCREENING MOTHER: ICD-10-CM

## 2024-07-26 DIAGNOSIS — Z34.03 PRIMIGRAVIDA IN THIRD TRIMESTER: ICD-10-CM

## 2024-07-26 LAB
GLUCOSE URINE, POC: NEGATIVE
PROTEIN UA: NEGATIVE

## 2024-07-26 PROCEDURE — 81002 URINALYSIS NONAUTO W/O SCOPE: CPT | Performed by: OBSTETRICS & GYNECOLOGY

## 2024-07-26 PROCEDURE — 99213 OFFICE O/P EST LOW 20 MIN: CPT | Performed by: OBSTETRICS & GYNECOLOGY

## 2024-07-26 PROCEDURE — 76820 UMBILICAL ARTERY ECHO: CPT | Performed by: OBSTETRICS & GYNECOLOGY

## 2024-07-26 PROCEDURE — 76818 FETAL BIOPHYS PROFILE W/NST: CPT | Performed by: OBSTETRICS & GYNECOLOGY

## 2024-07-26 NOTE — PROGRESS NOTES
Kimberly Manzo presents to office today for BPP/NST.  Patient denies bleeding, LOF, or contractions.  Positive fetal movement.   Scheduled for IOL 8/1/2024  
EVETTE, FELICITYMS, RVT  Director Maternal-Fetal Medicine  [facility]  [phone]

## 2024-07-26 NOTE — PATIENT INSTRUCTIONS
Patient Education        Counting Your Baby's Kicks: Care Instructions  Overview     Counting your baby's kicks is one way your doctor can tell that your baby is healthy. You will probably feel your baby move for the first time between 16 and 22 weeks. The movement may feel like flutters rather than kicks. Your baby may move more at certain times of the day. When you are active, you may notice less kicking than when you are resting. At your prenatal visits, your doctor will ask whether the baby is active.  In your last trimester, your doctor may ask you to count the number of times you feel your baby move.  Follow-up care is a key part of your treatment and safety. Be sure to make and go to all appointments, and call your doctor if you are having problems. It's also a good idea to know your test results and keep a list of the medicines you take.  How do you count fetal kicks?  A common method of checking your baby's movement is to note the length of time it takes to count 10 movements (such as kicks, flutters, or rolls).  Pick your baby's most active time of day to count. This may be any time from morning to evening.  If you don't feel 10 movements in an hour, have something to eat or drink and count for another hour. If you don't feel at least 10 movements in the 2-hour period, call your doctor.  Do not use an at-home Doppler heart monitor in place of counting fetal movements.  When should you call for help?   Call your doctor now or seek immediate medical care if:    You feel fewer than 10 movements in a 2-hour period.     You noticed that your baby has stopped moving or is moving less than normal.   Watch closely for changes in your health, and be sure to contact your doctor if you have any problems.  Where can you learn more?  Go to https://www.healthwise.net/patientEd and enter U048 to learn more about \"Counting Your Baby's Kicks: Care Instructions.\"  Current as of: July 10, 2023  Content Version: 14.1  © 
No

## 2024-07-29 PROBLEM — R94.8 ABNORMAL PLACENTA FUNCTION TEST: Status: ACTIVE | Noted: 2024-07-29

## 2024-08-01 ENCOUNTER — HOSPITAL ENCOUNTER (INPATIENT)
Age: 29
LOS: 3 days | Discharge: HOME | DRG: 540 | End: 2024-08-04
Attending: OBSTETRICS & GYNECOLOGY | Admitting: OBSTETRICS & GYNECOLOGY
Payer: MEDICAID

## 2024-08-01 ENCOUNTER — APPOINTMENT (OUTPATIENT)
Dept: LABOR AND DELIVERY | Age: 29
DRG: 540 | End: 2024-08-01
Payer: MEDICAID

## 2024-08-01 ENCOUNTER — ANESTHESIA EVENT (OUTPATIENT)
Dept: LABOR AND DELIVERY | Age: 29
End: 2024-08-01
Payer: MEDICAID

## 2024-08-01 ENCOUNTER — ANESTHESIA (OUTPATIENT)
Dept: LABOR AND DELIVERY | Age: 29
End: 2024-08-01
Payer: MEDICAID

## 2024-08-01 DIAGNOSIS — G89.18 POST-OP PAIN: Primary | ICD-10-CM

## 2024-08-01 PROBLEM — Z3A.39 39 WEEKS GESTATION OF PREGNANCY: Status: ACTIVE | Noted: 2024-08-01

## 2024-08-01 LAB
ABO + RH BLD: NORMAL
AMPHET UR QL SCN: NEGATIVE
ARM BAND NUMBER: NORMAL
BARBITURATES UR QL SCN: NEGATIVE
BENZODIAZ UR QL: NEGATIVE
BLOOD BANK SAMPLE EXPIRATION: NORMAL
BLOOD GROUP ANTIBODIES SERPL: NEGATIVE
BUPRENORPHINE UR QL: NEGATIVE
CANNABINOIDS UR QL SCN: POSITIVE
COCAINE UR QL SCN: NEGATIVE
ERYTHROCYTE [DISTWIDTH] IN BLOOD BY AUTOMATED COUNT: 12.3 % (ref 11.5–15)
FENTANYL UR QL: NEGATIVE
HCT VFR BLD AUTO: 39.3 % (ref 34–48)
HGB BLD-MCNC: 13.5 G/DL (ref 11.5–15.5)
MCH RBC QN AUTO: 29.5 PG (ref 26–35)
MCHC RBC AUTO-ENTMCNC: 34.4 G/DL (ref 32–34.5)
MCV RBC AUTO: 85.8 FL (ref 80–99.9)
METHADONE UR QL: NEGATIVE
OPIATES UR QL SCN: NEGATIVE
OXYCODONE UR QL SCN: NEGATIVE
PCP UR QL SCN: NEGATIVE
PLATELET # BLD AUTO: 341 K/UL (ref 130–450)
PMV BLD AUTO: 9.9 FL (ref 7–12)
RBC # BLD AUTO: 4.58 M/UL (ref 3.5–5.5)
TEST INFORMATION: ABNORMAL
WBC OTHER # BLD: 13.9 K/UL (ref 4.5–11.5)

## 2024-08-01 PROCEDURE — 99221 1ST HOSP IP/OBS SF/LOW 40: CPT | Performed by: PHYSICIAN ASSISTANT

## 2024-08-01 PROCEDURE — 86900 BLOOD TYPING SEROLOGIC ABO: CPT

## 2024-08-01 PROCEDURE — G0480 DRUG TEST DEF 1-7 CLASSES: HCPCS

## 2024-08-01 PROCEDURE — 6370000000 HC RX 637 (ALT 250 FOR IP): Performed by: OBSTETRICS & GYNECOLOGY

## 2024-08-01 PROCEDURE — 80307 DRUG TEST PRSMV CHEM ANLYZR: CPT

## 2024-08-01 PROCEDURE — 86901 BLOOD TYPING SEROLOGIC RH(D): CPT

## 2024-08-01 PROCEDURE — 1220000001 HC SEMI PRIVATE L&D R&B

## 2024-08-01 PROCEDURE — 6370000000 HC RX 637 (ALT 250 FOR IP)

## 2024-08-01 PROCEDURE — APPNB30 APP NON BILLABLE TIME 0-30 MINS: Performed by: PHYSICIAN ASSISTANT

## 2024-08-01 PROCEDURE — 2580000003 HC RX 258: Performed by: OBSTETRICS & GYNECOLOGY

## 2024-08-01 PROCEDURE — 86850 RBC ANTIBODY SCREEN: CPT

## 2024-08-01 PROCEDURE — 85027 COMPLETE CBC AUTOMATED: CPT

## 2024-08-01 RX ORDER — SODIUM CHLORIDE, SODIUM LACTATE, POTASSIUM CHLORIDE, CALCIUM CHLORIDE 600; 310; 30; 20 MG/100ML; MG/100ML; MG/100ML; MG/100ML
INJECTION, SOLUTION INTRAVENOUS CONTINUOUS
Status: DISCONTINUED | OUTPATIENT
Start: 2024-08-01 | End: 2024-08-02

## 2024-08-01 RX ORDER — SODIUM CHLORIDE, SODIUM LACTATE, POTASSIUM CHLORIDE, AND CALCIUM CHLORIDE .6; .31; .03; .02 G/100ML; G/100ML; G/100ML; G/100ML
500 INJECTION, SOLUTION INTRAVENOUS PRN
Status: DISCONTINUED | OUTPATIENT
Start: 2024-08-01 | End: 2024-08-02

## 2024-08-01 RX ORDER — TERBUTALINE SULFATE 1 MG/ML
0.25 INJECTION, SOLUTION SUBCUTANEOUS
Status: COMPLETED | OUTPATIENT
Start: 2024-08-01 | End: 2024-08-02

## 2024-08-01 RX ORDER — ACETAMINOPHEN 650 MG
TABLET, EXTENDED RELEASE ORAL
Status: DISPENSED
Start: 2024-08-01 | End: 2024-08-01

## 2024-08-01 RX ORDER — LIDOCAINE HYDROCHLORIDE 10 MG/ML
INJECTION, SOLUTION INFILTRATION; PERINEURAL
Status: DISPENSED
Start: 2024-08-01 | End: 2024-08-01

## 2024-08-01 RX ORDER — ONDANSETRON 2 MG/ML
4 INJECTION INTRAMUSCULAR; INTRAVENOUS EVERY 6 HOURS PRN
Status: DISCONTINUED | OUTPATIENT
Start: 2024-08-01 | End: 2024-08-02

## 2024-08-01 RX ORDER — ONDANSETRON 4 MG/1
4 TABLET, ORALLY DISINTEGRATING ORAL EVERY 6 HOURS PRN
Status: DISCONTINUED | OUTPATIENT
Start: 2024-08-01 | End: 2024-08-02

## 2024-08-01 RX ADMIN — SODIUM CHLORIDE, POTASSIUM CHLORIDE, SODIUM LACTATE AND CALCIUM CHLORIDE: 600; 310; 30; 20 INJECTION, SOLUTION INTRAVENOUS at 10:10

## 2024-08-01 RX ADMIN — Medication 25 MCG: at 11:00

## 2024-08-01 RX ADMIN — Medication 25 MCG: at 15:45

## 2024-08-01 RX ADMIN — Medication 25 MCG: at 19:50

## 2024-08-01 ASSESSMENT — LIFESTYLE VARIABLES: SMOKING_STATUS: 0

## 2024-08-01 NOTE — ANESTHESIA PRE PROCEDURE
Department of Anesthesiology  Preprocedure Note       Name:  Kimberly Manzo   Age:  28 y.o.  :  1995                                          MRN:  [id number]         Date:  2024      Surgeon: * No surgeons listed *    Procedure: * No procedures listed *    Medications prior to admission:   Prior to Admission medications    Medication Sig Start Date End Date Taking? Authorizing Provider   Prenatal Vit-Fe Fumarate-FA (PRENATAL PLUS) 27-1 MG TABS Take 1 tablet by mouth daily 3/19/24   Jessica Castillo DO   buPROPion (WELLBUTRIN XL) 300 MG extended release tablet Take 300 mg by mouth every morning  22  Provider, MD Amisha       Current medications:    Current Facility-Administered Medications   Medication Dose Route Frequency Provider Last Rate Last Admin    lactated ringers IV soln infusion   IntraVENous Continuous Jessica Castillo,  125 mL/hr at 24 1010 New Bag at 24 1010    lactated ringers bolus 500 mL  500 mL IntraVENous PRN Jessica Castillo, DO        Or    lactated ringers bolus 500 mL  500 mL IntraVENous PRN Jessica Castillo, DO        terbutaline (BRETHINE) injection 0.25 mg  0.25 mg SubCUTAneous Once PRN Jessica Castillo, DO        ondansetron (ZOFRAN) injection 4 mg  4 mg IntraVENous Q6H PRN Jessica Castillo, DO        Or    ondansetron (ZOFRAN-ODT) disintegrating tablet 4 mg  4 mg Oral Q6H PRN Jessica Castillo, DO        povidone-iodine (BETADINE) 10 % external solution             lidocaine 1 % injection             miSOPROStol (CYTOTEC) pre-split tablet TABS 25 mcg  25 mcg Vaginal Q4H Jessica Castillo, DO   25 mcg at 24 1545       Allergies:    Allergies   Allergen Reactions    Dust Mite Extract     Pollen Extract     Adhesive Tape Rash    Clarithromycin Nausea And Vomiting    Prednisone Rash       Problem List:    Patient Active Problem List   Diagnosis Code    Anxiety and depression F41.9, F32.A    Late prenatal care O09.30    Abnormal

## 2024-08-01 NOTE — H&P
Department of Obstetrics and Gynecology  Labor and Delivery   Physician Assistant Obstetrics History and Physical      Patient Name: Kimberly Manoz  YOB: 1995   MRN: [id number]    CHIEF COMPLAINT:  here for scheduled IOL    HISTORY OF PRESENT ILLNESS:    The patient is a 28 y.o. female,  A0, Estimated Date of Delivery: 24, EGA: 39 and 0/7 weeks presents to L&D from home for scheduled IOL.  Patient denies leakage of fluid, vaginal bleeding, contractions, cramping, swelling, RUQ or epigastric pain, headache, visual changes, or urinary symptoms.  Perceived fetal movement is good.    Her current obstetrical history is significant for:   Low-lying placenta, resolved  Late prenatal care  Marijuana use during first trimester  Abnormal chromosomal and genetic finding on  screening mother       PAST OB HISTORY  OB History    Para Term  AB Living   1             SAB IAB Ectopic Molar Multiple Live Births                    # Outcome Date GA Lbr Juancho/2nd Weight Sex Delivery Anes PTL Lv   1 Current                Past Medical History:    Diagnosis Date    ADHD (attention deficit hyperactivity disorder)     Bipolar 1 disorder (HCC)     Depression     Pneumonia     PTSD (post-traumatic stress disorder)         Past Surgical History:    Procedure Laterality Date    TYMPANOSTOMY TUBE PLACEMENT      WISDOM TOOTH EXTRACTION          Medications Prior to Admission:  Medications Prior to Admission: Prenatal Vit-Fe Fumarate-FA (PRENATAL PLUS) 27-1 MG TABS, Take 1 tablet by mouth daily    Allergies:  Dust mite extract, Pollen extract, Adhesive tape, Clarithromycin, and Prednisone    Social History:    Tobacco Use      Smoking status: Former        Types: E-Cigarettes      Smokeless tobacco: Never      Tobacco comments: Havent smoked cigarettes in over 7 years, quit vaping as soon as i found out i was pregnant.      Social History     Substance and Sexual Activity   Alcohol Use No    Comment:

## 2024-08-01 NOTE — PROGRESS NOTES
39 weeks presents to L&D for scheduled IOL. Pt denies lof, vb or ctxs. States good fetal movement. EFM applied.

## 2024-08-01 NOTE — PROGRESS NOTES
Dr. Castillo called into unit. Updated patient received 2nd dose of cytotec. Orders to place 3rd dose of cytotec when due as long as patient is not dilated. If any questions Dr. Castillo states to call her cellphone.

## 2024-08-02 LAB
ABNORMAL SPECIMEN VALIDITY TEST: ABNORMAL
COMPLIANCE DRUG ANALYSIS, URINE: NORMAL
INTEGRITY CHECK, CREATININE, URINE: 478.7 MG/DL (ref 22–250)
INTEGRITY CHECK, OXIDANT, URINE: <40 MG/L
INTEGRITY CHECK, PH, URINE: 5.9 (ref 4.5–9)
INTEGRITY CHECK, SPECIFIC GRAVITY, URINE: 1.03 (ref 1–1.03)
THC NORMALIZED, QUANTITIATIVE, URINE: 7.9 NG/ML
THC-COOH, QUANTITATIVE, URINE: 37.6 NG/ML

## 2024-08-02 PROCEDURE — 7100000000 HC PACU RECOVERY - FIRST 15 MIN: Performed by: OBSTETRICS & GYNECOLOGY

## 2024-08-02 PROCEDURE — 6360000002 HC RX W HCPCS

## 2024-08-02 PROCEDURE — 87491 CHLMYD TRACH DNA AMP PROBE: CPT

## 2024-08-02 PROCEDURE — 2580000003 HC RX 258: Performed by: OBSTETRICS & GYNECOLOGY

## 2024-08-02 PROCEDURE — 6360000002 HC RX W HCPCS: Performed by: OBSTETRICS & GYNECOLOGY

## 2024-08-02 PROCEDURE — 2709999900 HC NON-CHARGEABLE SUPPLY: Performed by: OBSTETRICS & GYNECOLOGY

## 2024-08-02 PROCEDURE — 3609079900 HC CESAREAN SECTION: Performed by: OBSTETRICS & GYNECOLOGY

## 2024-08-02 PROCEDURE — 7100000001 HC PACU RECOVERY - ADDTL 15 MIN: Performed by: OBSTETRICS & GYNECOLOGY

## 2024-08-02 PROCEDURE — 1220000000 HC SEMI PRIVATE OB R&B

## 2024-08-02 PROCEDURE — 59514 CESAREAN DELIVERY ONLY: CPT | Performed by: STUDENT IN AN ORGANIZED HEALTH CARE EDUCATION/TRAINING PROGRAM

## 2024-08-02 PROCEDURE — 2500000003 HC RX 250 WO HCPCS: Performed by: ANESTHESIOLOGY

## 2024-08-02 PROCEDURE — 2500000003 HC RX 250 WO HCPCS

## 2024-08-02 PROCEDURE — 6370000000 HC RX 637 (ALT 250 FOR IP)

## 2024-08-02 PROCEDURE — 6370000000 HC RX 637 (ALT 250 FOR IP): Performed by: OBSTETRICS & GYNECOLOGY

## 2024-08-02 PROCEDURE — 3700000000 HC ANESTHESIA ATTENDED CARE: Performed by: OBSTETRICS & GYNECOLOGY

## 2024-08-02 PROCEDURE — 6360000002 HC RX W HCPCS: Performed by: ANESTHESIOLOGY

## 2024-08-02 PROCEDURE — 2580000003 HC RX 258

## 2024-08-02 PROCEDURE — 87591 N.GONORRHOEAE DNA AMP PROB: CPT

## 2024-08-02 PROCEDURE — 3700000001 HC ADD 15 MINUTES (ANESTHESIA): Performed by: OBSTETRICS & GYNECOLOGY

## 2024-08-02 RX ORDER — LIDOCAINE HYDROCHLORIDE AND EPINEPHRINE 15; 5 MG/ML; UG/ML
INJECTION, SOLUTION EPIDURAL PRN
Status: DISCONTINUED | OUTPATIENT
Start: 2024-08-02 | End: 2024-08-02 | Stop reason: SDUPTHER

## 2024-08-02 RX ORDER — IPRATROPIUM BROMIDE AND ALBUTEROL SULFATE 2.5; .5 MG/3ML; MG/3ML
1 SOLUTION RESPIRATORY (INHALATION)
Status: DISPENSED | OUTPATIENT
Start: 2024-08-02 | End: 2024-08-03

## 2024-08-02 RX ORDER — CEFAZOLIN 2 G/1
INJECTION, POWDER, FOR SOLUTION INTRAMUSCULAR; INTRAVENOUS
Status: DISCONTINUED
Start: 2024-08-02 | End: 2024-08-02

## 2024-08-02 RX ORDER — ONDANSETRON 2 MG/ML
4 INJECTION INTRAMUSCULAR; INTRAVENOUS EVERY 6 HOURS PRN
Status: DISCONTINUED | OUTPATIENT
Start: 2024-08-02 | End: 2024-08-02

## 2024-08-02 RX ORDER — NALOXONE HYDROCHLORIDE 0.4 MG/ML
INJECTION, SOLUTION INTRAMUSCULAR; INTRAVENOUS; SUBCUTANEOUS PRN
Status: DISCONTINUED | OUTPATIENT
Start: 2024-08-02 | End: 2024-08-02

## 2024-08-02 RX ORDER — ONDANSETRON 2 MG/ML
4 INJECTION INTRAMUSCULAR; INTRAVENOUS
Status: ACTIVE | OUTPATIENT
Start: 2024-08-02 | End: 2024-08-03

## 2024-08-02 RX ORDER — DEXMEDETOMIDINE HYDROCHLORIDE 100 UG/ML
INJECTION, SOLUTION INTRAVENOUS PRN
Status: DISCONTINUED | OUTPATIENT
Start: 2024-08-02 | End: 2024-08-02 | Stop reason: SDUPTHER

## 2024-08-02 RX ORDER — SODIUM CHLORIDE 9 MG/ML
INJECTION, SOLUTION INTRAVENOUS PRN
Status: DISCONTINUED | OUTPATIENT
Start: 2024-08-02 | End: 2024-08-04 | Stop reason: HOSPADM

## 2024-08-02 RX ORDER — MIDAZOLAM HYDROCHLORIDE 2 MG/2ML
2 INJECTION, SOLUTION INTRAMUSCULAR; INTRAVENOUS
Status: ACTIVE | OUTPATIENT
Start: 2024-08-02 | End: 2024-08-03

## 2024-08-02 RX ORDER — DOCUSATE SODIUM 100 MG/1
100 CAPSULE, LIQUID FILLED ORAL 2 TIMES DAILY
Status: DISCONTINUED | OUTPATIENT
Start: 2024-08-02 | End: 2024-08-04 | Stop reason: HOSPADM

## 2024-08-02 RX ORDER — PROMETHAZINE HYDROCHLORIDE 25 MG/ML
25 INJECTION, SOLUTION INTRAMUSCULAR; INTRAVENOUS EVERY 6 HOURS PRN
Status: DISCONTINUED | OUTPATIENT
Start: 2024-08-02 | End: 2024-08-02

## 2024-08-02 RX ORDER — CITRIC ACID/SODIUM CITRATE 334-500MG
30 SOLUTION, ORAL ORAL ONCE
Status: COMPLETED | OUTPATIENT
Start: 2024-08-02 | End: 2024-08-02

## 2024-08-02 RX ORDER — ACETAMINOPHEN 325 MG/1
650 TABLET ORAL EVERY 4 HOURS PRN
Status: DISPENSED | OUTPATIENT
Start: 2024-08-02 | End: 2024-08-03

## 2024-08-02 RX ORDER — DIPHENHYDRAMINE HYDROCHLORIDE 50 MG/ML
25 INJECTION INTRAMUSCULAR; INTRAVENOUS EVERY 6 HOURS PRN
Status: ACTIVE | OUTPATIENT
Start: 2024-08-02 | End: 2024-08-03

## 2024-08-02 RX ORDER — CARBOPROST TROMETHAMINE 250 UG/ML
250 INJECTION, SOLUTION INTRAMUSCULAR PRN
Status: DISCONTINUED | OUTPATIENT
Start: 2024-08-02 | End: 2024-08-02

## 2024-08-02 RX ORDER — SODIUM CHLORIDE 0.9 % (FLUSH) 0.9 %
5-40 SYRINGE (ML) INJECTION EVERY 12 HOURS SCHEDULED
Status: DISCONTINUED | OUTPATIENT
Start: 2024-08-02 | End: 2024-08-04 | Stop reason: HOSPADM

## 2024-08-02 RX ORDER — DEXMEDETOMIDINE HYDROCHLORIDE 100 UG/ML
INJECTION, SOLUTION INTRAVENOUS
Status: COMPLETED
Start: 2024-08-02 | End: 2024-08-02

## 2024-08-02 RX ORDER — BISACODYL 10 MG
10 SUPPOSITORY, RECTAL RECTAL DAILY PRN
Status: DISCONTINUED | OUTPATIENT
Start: 2024-08-02 | End: 2024-08-04 | Stop reason: HOSPADM

## 2024-08-02 RX ORDER — PRENATAL WITH FERROUS FUM AND FOLIC ACID 3080; 920; 120; 400; 22; 1.84; 3; 20; 10; 1; 12; 200; 27; 25; 2 [IU]/1; [IU]/1; MG/1; [IU]/1; MG/1; MG/1; MG/1; MG/1; MG/1; MG/1; UG/1; MG/1; MG/1; MG/1; MG/1
1 TABLET ORAL DAILY
Status: DISCONTINUED | OUTPATIENT
Start: 2024-08-02 | End: 2024-08-04 | Stop reason: HOSPADM

## 2024-08-02 RX ORDER — OXYCODONE HYDROCHLORIDE 5 MG/1
10 TABLET ORAL EVERY 4 HOURS PRN
Status: ACTIVE | OUTPATIENT
Start: 2024-08-02 | End: 2024-08-03

## 2024-08-02 RX ORDER — TERBUTALINE SULFATE 1 MG/ML
INJECTION, SOLUTION SUBCUTANEOUS
Status: COMPLETED
Start: 2024-08-02 | End: 2024-08-02

## 2024-08-02 RX ORDER — ONDANSETRON 2 MG/ML
4 INJECTION INTRAMUSCULAR; INTRAVENOUS EVERY 6 HOURS PRN
Status: DISCONTINUED | OUTPATIENT
Start: 2024-08-02 | End: 2024-08-04 | Stop reason: HOSPADM

## 2024-08-02 RX ORDER — SODIUM CHLORIDE 0.9 % (FLUSH) 0.9 %
5-40 SYRINGE (ML) INJECTION PRN
Status: DISCONTINUED | OUTPATIENT
Start: 2024-08-02 | End: 2024-08-04 | Stop reason: HOSPADM

## 2024-08-02 RX ORDER — CHLOROPROCAINE HYDROCHLORIDE 30 MG/ML
INJECTION, SOLUTION EPIDURAL; INFILTRATION; INTRACAUDAL; PERINEURAL PRN
Status: DISCONTINUED | OUTPATIENT
Start: 2024-08-02 | End: 2024-08-02 | Stop reason: SDUPTHER

## 2024-08-02 RX ORDER — MODIFIED LANOLIN
OINTMENT (GRAM) TOPICAL
Status: DISCONTINUED | OUTPATIENT
Start: 2024-08-02 | End: 2024-08-04 | Stop reason: HOSPADM

## 2024-08-02 RX ORDER — NALOXONE HYDROCHLORIDE 0.4 MG/ML
INJECTION, SOLUTION INTRAMUSCULAR; INTRAVENOUS; SUBCUTANEOUS PRN
Status: ACTIVE | OUTPATIENT
Start: 2024-08-02 | End: 2024-08-03

## 2024-08-02 RX ORDER — OXYCODONE HYDROCHLORIDE 5 MG/1
5 TABLET ORAL EVERY 4 HOURS PRN
Status: ACTIVE | OUTPATIENT
Start: 2024-08-02 | End: 2024-08-03

## 2024-08-02 RX ORDER — CITRIC ACID/SODIUM CITRATE 334-500MG
SOLUTION, ORAL ORAL
Status: COMPLETED
Start: 2024-08-02 | End: 2024-08-02

## 2024-08-02 RX ORDER — MORPHINE SULFATE 0.5 MG/ML
INJECTION, SOLUTION EPIDURAL; INTRATHECAL; INTRAVENOUS PRN
Status: DISCONTINUED | OUTPATIENT
Start: 2024-08-02 | End: 2024-08-02 | Stop reason: SDUPTHER

## 2024-08-02 RX ORDER — OXYCODONE HYDROCHLORIDE 5 MG/1
10 TABLET ORAL EVERY 4 HOURS PRN
Status: DISCONTINUED | OUTPATIENT
Start: 2024-08-02 | End: 2024-08-04 | Stop reason: HOSPADM

## 2024-08-02 RX ORDER — ACETAMINOPHEN 500 MG
1000 TABLET ORAL EVERY 8 HOURS PRN
Status: DISCONTINUED | OUTPATIENT
Start: 2024-08-02 | End: 2024-08-04 | Stop reason: HOSPADM

## 2024-08-02 RX ORDER — MEPERIDINE HYDROCHLORIDE 25 MG/ML
12.5 INJECTION INTRAMUSCULAR; INTRAVENOUS; SUBCUTANEOUS EVERY 5 MIN PRN
Status: DISCONTINUED | OUTPATIENT
Start: 2024-08-02 | End: 2024-08-04 | Stop reason: HOSPADM

## 2024-08-02 RX ORDER — SODIUM CHLORIDE, SODIUM LACTATE, POTASSIUM CHLORIDE, AND CALCIUM CHLORIDE .6; .31; .03; .02 G/100ML; G/100ML; G/100ML; G/100ML
1000 INJECTION, SOLUTION INTRAVENOUS ONCE
Status: COMPLETED | OUTPATIENT
Start: 2024-08-02 | End: 2024-08-02

## 2024-08-02 RX ORDER — SODIUM CHLORIDE, SODIUM LACTATE, POTASSIUM CHLORIDE, CALCIUM CHLORIDE 600; 310; 30; 20 MG/100ML; MG/100ML; MG/100ML; MG/100ML
INJECTION, SOLUTION INTRAVENOUS CONTINUOUS
Status: DISCONTINUED | OUTPATIENT
Start: 2024-08-02 | End: 2024-08-04 | Stop reason: HOSPADM

## 2024-08-02 RX ORDER — NALOXONE HYDROCHLORIDE 0.4 MG/ML
INJECTION, SOLUTION INTRAMUSCULAR; INTRAVENOUS; SUBCUTANEOUS PRN
Status: DISCONTINUED | OUTPATIENT
Start: 2024-08-02 | End: 2024-08-04 | Stop reason: HOSPADM

## 2024-08-02 RX ORDER — SIMETHICONE 80 MG
80 TABLET,CHEWABLE ORAL EVERY 6 HOURS PRN
Status: DISCONTINUED | OUTPATIENT
Start: 2024-08-02 | End: 2024-08-04 | Stop reason: HOSPADM

## 2024-08-02 RX ORDER — WATER 10 ML/10ML
INJECTION INTRAMUSCULAR; INTRAVENOUS; SUBCUTANEOUS
Status: DISCONTINUED
Start: 2024-08-02 | End: 2024-08-02

## 2024-08-02 RX ORDER — PHENYLEPHRINE HCL IN 0.9% NACL 1 MG/10 ML
SYRINGE (ML) INTRAVENOUS PRN
Status: DISCONTINUED | OUTPATIENT
Start: 2024-08-02 | End: 2024-08-02 | Stop reason: SDUPTHER

## 2024-08-02 RX ORDER — TERBUTALINE SULFATE 1 MG/ML
0.25 INJECTION, SOLUTION SUBCUTANEOUS ONCE
Status: COMPLETED | OUTPATIENT
Start: 2024-08-02 | End: 2024-08-02

## 2024-08-02 RX ORDER — MEPERIDINE HYDROCHLORIDE 25 MG/ML
25 INJECTION INTRAMUSCULAR; INTRAVENOUS; SUBCUTANEOUS EVERY 4 HOURS PRN
Status: DISCONTINUED | OUTPATIENT
Start: 2024-08-02 | End: 2024-08-02

## 2024-08-02 RX ORDER — METHYLERGONOVINE MALEATE 0.2 MG/ML
200 INJECTION INTRAVENOUS PRN
Status: DISCONTINUED | OUTPATIENT
Start: 2024-08-02 | End: 2024-08-02

## 2024-08-02 RX ORDER — TRANEXAMIC ACID 10 MG/ML
1000 INJECTION, SOLUTION INTRAVENOUS
Status: DISCONTINUED | OUTPATIENT
Start: 2024-08-02 | End: 2024-08-02

## 2024-08-02 RX ORDER — BUPIVACAINE HYDROCHLORIDE 7.5 MG/ML
INJECTION, SOLUTION INTRASPINAL PRN
Status: DISCONTINUED | OUTPATIENT
Start: 2024-08-02 | End: 2024-08-02 | Stop reason: SDUPTHER

## 2024-08-02 RX ORDER — OXYCODONE HYDROCHLORIDE 5 MG/1
5 TABLET ORAL EVERY 4 HOURS PRN
Status: DISCONTINUED | OUTPATIENT
Start: 2024-08-02 | End: 2024-08-04 | Stop reason: HOSPADM

## 2024-08-02 RX ORDER — DIPHENHYDRAMINE HYDROCHLORIDE 50 MG/ML
25 INJECTION INTRAMUSCULAR; INTRAVENOUS EVERY 6 HOURS PRN
Status: DISCONTINUED | OUTPATIENT
Start: 2024-08-02 | End: 2024-08-04 | Stop reason: HOSPADM

## 2024-08-02 RX ORDER — ONDANSETRON 2 MG/ML
4 INJECTION INTRAMUSCULAR; INTRAVENOUS EVERY 6 HOURS PRN
Status: ACTIVE | OUTPATIENT
Start: 2024-08-02 | End: 2024-08-03

## 2024-08-02 RX ORDER — ONDANSETRON 2 MG/ML
INJECTION INTRAMUSCULAR; INTRAVENOUS PRN
Status: DISCONTINUED | OUTPATIENT
Start: 2024-08-02 | End: 2024-08-02 | Stop reason: SDUPTHER

## 2024-08-02 RX ORDER — SODIUM CHLORIDE, SODIUM LACTATE, POTASSIUM CHLORIDE, CALCIUM CHLORIDE 600; 310; 30; 20 MG/100ML; MG/100ML; MG/100ML; MG/100ML
INJECTION, SOLUTION INTRAVENOUS CONTINUOUS PRN
Status: DISCONTINUED | OUTPATIENT
Start: 2024-08-02 | End: 2024-08-02 | Stop reason: SDUPTHER

## 2024-08-02 RX ORDER — DIPHENHYDRAMINE HCL 25 MG
25 TABLET ORAL EVERY 6 HOURS PRN
Status: ACTIVE | OUTPATIENT
Start: 2024-08-02 | End: 2024-08-03

## 2024-08-02 RX ORDER — ONDANSETRON 4 MG/1
4 TABLET, ORALLY DISINTEGRATING ORAL EVERY 8 HOURS PRN
Status: DISCONTINUED | OUTPATIENT
Start: 2024-08-02 | End: 2024-08-04 | Stop reason: HOSPADM

## 2024-08-02 RX ORDER — KETOROLAC TROMETHAMINE 30 MG/ML
30 INJECTION, SOLUTION INTRAMUSCULAR; INTRAVENOUS EVERY 6 HOURS PRN
Status: DISPENSED | OUTPATIENT
Start: 2024-08-02 | End: 2024-08-03

## 2024-08-02 RX ORDER — MISOPROSTOL 200 UG/1
400 TABLET ORAL PRN
Status: DISCONTINUED | OUTPATIENT
Start: 2024-08-02 | End: 2024-08-02

## 2024-08-02 RX ORDER — FERROUS SULFATE 325(65) MG
325 TABLET ORAL EVERY OTHER DAY
Status: DISCONTINUED | OUTPATIENT
Start: 2024-08-02 | End: 2024-08-04 | Stop reason: HOSPADM

## 2024-08-02 RX ORDER — IBUPROFEN 800 MG/1
800 TABLET ORAL EVERY 8 HOURS PRN
Status: DISCONTINUED | OUTPATIENT
Start: 2024-08-02 | End: 2024-08-04 | Stop reason: HOSPADM

## 2024-08-02 RX ADMIN — Medication 1 MILLI-UNITS/MIN: at 05:08

## 2024-08-02 RX ADMIN — ONDANSETRON 4 MG: 2 INJECTION INTRAMUSCULAR; INTRAVENOUS at 10:28

## 2024-08-02 RX ADMIN — DEXMEDETOMIDINE 10 MCG: 100 INJECTION, SOLUTION INTRAVENOUS at 10:40

## 2024-08-02 RX ADMIN — MEPERIDINE HYDROCHLORIDE 25 MG: 25 INJECTION INTRAMUSCULAR; INTRAVENOUS; SUBCUTANEOUS at 08:26

## 2024-08-02 RX ADMIN — LIDOCAINE HYDROCHLORIDE,EPINEPHRINE BITARTRATE 1 ML: 15; .005 INJECTION, SOLUTION EPIDURAL; INFILTRATION; INTRACAUDAL; PERINEURAL at 09:10

## 2024-08-02 RX ADMIN — PROMETHAZINE HYDROCHLORIDE 25 MG: 25 INJECTION INTRAMUSCULAR; INTRAVENOUS at 04:09

## 2024-08-02 RX ADMIN — MORPHINE SULFATE 0.15 MG: 0.5 INJECTION EPIDURAL; INTRATHECAL; INTRAVENOUS at 10:38

## 2024-08-02 RX ADMIN — SODIUM CHLORIDE, POTASSIUM CHLORIDE, SODIUM LACTATE AND CALCIUM CHLORIDE: 600; 310; 30; 20 INJECTION, SOLUTION INTRAVENOUS at 10:18

## 2024-08-02 RX ADMIN — SODIUM CHLORIDE, POTASSIUM CHLORIDE, SODIUM LACTATE AND CALCIUM CHLORIDE: 600; 310; 30; 20 INJECTION, SOLUTION INTRAVENOUS at 10:40

## 2024-08-02 RX ADMIN — Medication: at 18:35

## 2024-08-02 RX ADMIN — Medication 30 ML: at 10:10

## 2024-08-02 RX ADMIN — CHLOROPROCAINE HYDROCHLORIDE 15 ML: 30 INJECTION, SOLUTION EPIDURAL; INFILTRATION; INTRACAUDAL; PERINEURAL at 10:18

## 2024-08-02 RX ADMIN — SODIUM CHLORIDE, PRESERVATIVE FREE 10 ML: 5 INJECTION INTRAVENOUS at 18:36

## 2024-08-02 RX ADMIN — TERBUTALINE SULFATE 0.25 MG: 1 INJECTION, SOLUTION SUBCUTANEOUS at 08:44

## 2024-08-02 RX ADMIN — Medication 100 MCG: at 10:49

## 2024-08-02 RX ADMIN — Medication 15 ML/HR: at 09:11

## 2024-08-02 RX ADMIN — SODIUM CHLORIDE, POTASSIUM CHLORIDE, SODIUM LACTATE AND CALCIUM CHLORIDE: 600; 310; 30; 20 INJECTION, SOLUTION INTRAVENOUS at 12:09

## 2024-08-02 RX ADMIN — TERBUTALINE SULFATE 0.25 MG: 1 INJECTION, SOLUTION SUBCUTANEOUS at 01:57

## 2024-08-02 RX ADMIN — KETOROLAC TROMETHAMINE 30 MG: 30 INJECTION, SOLUTION INTRAMUSCULAR at 18:35

## 2024-08-02 RX ADMIN — SIMETHICONE 80 MG: 80 TABLET, CHEWABLE ORAL at 18:37

## 2024-08-02 RX ADMIN — ONDANSETRON 4 MG: 2 INJECTION INTRAMUSCULAR; INTRAVENOUS at 08:25

## 2024-08-02 RX ADMIN — SODIUM CITRATE AND CITRIC ACID MONOHYDRATE 30 ML: 500; 334 SOLUTION ORAL at 10:10

## 2024-08-02 RX ADMIN — WATER 2000 MG: 1 INJECTION INTRAMUSCULAR; INTRAVENOUS; SUBCUTANEOUS at 10:10

## 2024-08-02 RX ADMIN — DEXMEDETOMIDINE 10 MCG: 100 INJECTION, SOLUTION INTRAVENOUS at 10:27

## 2024-08-02 RX ADMIN — SODIUM CHLORIDE, POTASSIUM CHLORIDE, SODIUM LACTATE AND CALCIUM CHLORIDE 1000 ML: 600; 310; 30; 20 INJECTION, SOLUTION INTRAVENOUS at 10:11

## 2024-08-02 RX ADMIN — ACETAMINOPHEN 1000 MG: 500 TABLET ORAL at 22:31

## 2024-08-02 RX ADMIN — MEPERIDINE HYDROCHLORIDE 25 MG: 25 INJECTION INTRAMUSCULAR; INTRAVENOUS; SUBCUTANEOUS at 04:09

## 2024-08-02 RX ADMIN — Medication 909 ML/HR: at 10:48

## 2024-08-02 RX ADMIN — Medication 150 MCG: at 10:40

## 2024-08-02 RX ADMIN — Medication 150 MCG: at 10:39

## 2024-08-02 RX ADMIN — SODIUM CHLORIDE, POTASSIUM CHLORIDE, SODIUM LACTATE AND CALCIUM CHLORIDE: 600; 310; 30; 20 INJECTION, SOLUTION INTRAVENOUS at 15:09

## 2024-08-02 RX ADMIN — BUPIVACAINE HYDROCHLORIDE 1.4 ML: 7.5 INJECTION, SOLUTION SUBARACHNOID at 10:38

## 2024-08-02 RX ADMIN — Medication 87.3 MILLI-UNITS/MIN: at 12:09

## 2024-08-02 RX ADMIN — CHLOROPROCAINE HYDROCHLORIDE 5 ML: 30 INJECTION, SOLUTION EPIDURAL; INFILTRATION; INTRACAUDAL; PERINEURAL at 10:25

## 2024-08-02 ASSESSMENT — PAIN DESCRIPTION - DESCRIPTORS
DESCRIPTORS: CRAMPING
DESCRIPTORS: DISCOMFORT;SORE
DESCRIPTORS: CRAMPING
DESCRIPTORS: DISCOMFORT

## 2024-08-02 ASSESSMENT — PAIN SCALES - GENERAL
PAINLEVEL_OUTOF10: 7
PAINLEVEL_OUTOF10: 10
PAINLEVEL_OUTOF10: 2
PAINLEVEL_OUTOF10: 2

## 2024-08-02 ASSESSMENT — PAIN DESCRIPTION - ORIENTATION
ORIENTATION: LOWER

## 2024-08-02 ASSESSMENT — PAIN - FUNCTIONAL ASSESSMENT
PAIN_FUNCTIONAL_ASSESSMENT: ACTIVITIES ARE NOT PREVENTED

## 2024-08-02 ASSESSMENT — PAIN DESCRIPTION - LOCATION
LOCATION: ABDOMEN;INCISION
LOCATION: ABDOMEN

## 2024-08-02 ASSESSMENT — LIFESTYLE VARIABLES: SMOKING_STATUS: 0

## 2024-08-02 ASSESSMENT — PAIN DESCRIPTION - ONSET: ONSET: ON-GOING

## 2024-08-02 ASSESSMENT — PAIN DESCRIPTION - PAIN TYPE: TYPE: ACUTE PAIN;SURGICAL PAIN

## 2024-08-02 ASSESSMENT — PAIN DESCRIPTION - FREQUENCY: FREQUENCY: CONTINUOUS

## 2024-08-02 NOTE — CARE COORDINATION
Addendum  Per  [facility] supervisor Rebekah CULP, referral was screened out. Therefore, baby CAN dischagre home with mom. [facility] will NOT be involved at this time.  Electronically signed by KEELEY Hoyos on 8/2/2024 at 4:23 PM

## 2024-08-02 NOTE — PROGRESS NOTES
Name: Kimberly Manzo                Quaker: Unknown   Referral: Baby Reading      Assessment:  Parent(s) were receptive to  visit and baby blessing.        Intervention:   provided blessing for new born and parent(s) and distributed prayer card for family. Patient shared...     Outcome:  Parent/S appreciated blessing for child.     Plan:  Chaplains will remain available to offer spiritual and emotional support as needed.       Electronically signed by MATT Jade, on 8/2/2024 at 4:48 PM.  Spiritual Health Services  [facility]  [phone]

## 2024-08-02 NOTE — PROGRESS NOTES
Dr Castillo updated on FHTs, no longer have decelerations, minimal variability with 1 acceleration seen. Pt sitting for epidural.  states to have primary RN check pt following epidural, and call her with an update

## 2024-08-02 NOTE — PROGRESS NOTES
Updated Dr Castillo on EFM, SROM, SVE and epidural placement. Primary LTCS for non reassuring heart tones per Dr Castillo.

## 2024-08-02 NOTE — CARE COORDINATION
SW consult noted. Pt presently in recovery. Will retry to talk to her at later time.  Electronically signed by KEELEY Hoyos on 8/2/2024 at 1:03 PM     Addendum  SISSY met with Kimberly, who was alone in her room and baby in nursery.  Kimberly said she lives with george and reported FOB Bk Wu ([birth year]). She declined to add anyone else to her emergency contact list. She has [facility]. She drives. She plans to sign up for [facility] and is agreeable to [facility] referral. She said she is prepared with car seat, basinet and crib. Kimberly denied DV hx and said she is safe at home.  PNC was with Dr Castillo and Pediatric care will be with AHMET Staples.      Kimberly said she sees Dr Urbano Avendano for psychiatry, but not during her pregnancy. She has inpatient psych admission July 2023 per Epic. Kimberly said she is treated for \"Bipolar and PTSD\". Kimberly said she will access outpt psychiatric care again post delivery. We discussed her + UDS for cannabinoid 8/1/24. Pt said she has a medical marijuana card. She denied other drug use hx.    PLAN:   [facility] referral made.  Updated with Dorota Calderon.   SISSY called referral to Tammi with [facility] [phone].  Baby can NOT discharge until we hear back from [facility] on disposition.  Electronically signed by KEELEY Hoyos on 8/2/2024 at 3:23 PM

## 2024-08-02 NOTE — PROGRESS NOTES
Dr Castillo updated on FHTs including minimal variability and decelerations. Unable to trace contractions at this time. States to gve pt terbutaline x1 and call dr in 20 min with results

## 2024-08-02 NOTE — ANESTHESIA PROCEDURE NOTES
Spinal Block    Patient location during procedure: OR  End time: 8/2/2024 10:35 AM  Reason for block: primary anesthetic  Staffing  Performed: other anesthesia staff   Anesthesiologist: Ysabel Valiente DO  Resident/CRNA: Erlinda Kaiser APRN - NICKY  Other anesthesia staff: Ba Shafer RN  Performed by: Ba Shafer RN  Authorized by: Ysabel Valiente DO    Spinal Block  Patient position: sitting  Prep: ChloraPrep  Patient monitoring: continuous pulse ox and frequent blood pressure checks  Approach: midline  Location: L3/L4  Provider prep: mask  Needle  Needle type: Pencan   Needle gauge: 25 G  Needle length: 3.5 in  Assessment  Sensory level: T4  Swirl obtained: Yes  CSF: clear  Attempts: 1  Hemodynamics: stable  Preanesthetic Checklist  Completed: patient identified, IV checked, site marked, risks and benefits discussed, surgical/procedural consents, equipment checked, pre-op evaluation, timeout performed, anesthesia consent given, oxygen available, monitors applied/VS acknowledged, fire risk safety assessment completed and verbalized and blood product R/B/A discussed and consented

## 2024-08-02 NOTE — ANESTHESIA PROCEDURE NOTES
CSE Block    Patient location during procedure: OB  Start time: 8/2/2024 8:54 AM  End time: 8/2/2024 9:17 AM  Reason for block: labor epidural  Staffing  Performed: other anesthesia staff and resident/CRNA   Anesthesiologist: Ysabel Valiente DO  Resident/CRNA: Erlinda Kaiser APRN - NICKY  Other anesthesia staff: Ba Shafer, RN  Performed by: Ba Shafer RN  Authorized by: Ysabel Valiente DO    CSE  Patient position: sitting  Prep: ChloraPrep  Patient monitoring: continuous pulse ox and frequent blood pressure checks  Approach: midline  Provider prep: mask and sterile gloves  Spinal Needle  Needle type: pencil-tip   Needle gauge: 27 G  Needle length: 5in.  Epidural Needle  Injection technique: NISHANT air  Needle type: Tuohy   Needle gauge: 18 G  Needle length: 3.5 in  Needle insertion depth: 7 cm  Location: lumbar (1-5)  Catheter  Catheter type: end hole  Catheter size: 20g.  Catheter at skin depth: 13 cm  Test dose: negative  Assessment  Hemodynamics: stable  Preanesthetic Checklist  Completed: patient identified, IV checked, site marked, risks and benefits discussed, surgical/procedural consents, equipment checked, pre-op evaluation, timeout performed, anesthesia consent given, oxygen available, monitors applied/VS acknowledged, fire risk safety assessment completed and verbalized and blood product R/B/A discussed and consented

## 2024-08-02 NOTE — PROGRESS NOTES
Normal  delivery with Dr. Castillo via primary LTCS d/t non reassuring heart tones at 1047. NICU at delivery d/t suspected trisomy 13. APGARS 9/9. [place] to normal nursery.

## 2024-08-02 NOTE — PROGRESS NOTES
FIRST ASSIST NOTE    PreOp DX:  39w1d Pregnancy     Failed induction     Post OP Dx:  Same + live          Procedure:   section / Assistance     Anesthesia:  Regional     Due to lack of a resident, I was asked to assist Dr. Tobar  who performed a  section. I assisted with retraction and exposure and suture management/cutting throughout the case.

## 2024-08-02 NOTE — PROGRESS NOTES
Dr. Castillo updated SVE 1/TH/-3 and ctx every 1-3 minutes. Orders received for begin Pitocin and patient may have epidural when she wants one.

## 2024-08-02 NOTE — ANESTHESIA PRE PROCEDURE
Department of Anesthesiology  Preprocedure Note       Name:  Kimberly Manzo   Age:  28 y.o.  :  1995                                          MRN:  [id number]         Date:  2024      Surgeon: * No surgeons listed *    Procedure: * No procedures listed *    Medications prior to admission:   Prior to Admission medications    Medication Sig Start Date End Date Taking? Authorizing Provider   Prenatal Vit-Fe Fumarate-FA (PRENATAL PLUS) 27-1 MG TABS Take 1 tablet by mouth daily 3/19/24   Jessica Castillo,    buPROPion (WELLBUTRIN XL) 300 MG extended release tablet Take 300 mg by mouth every morning  22  Provider, MD Amisha       Current medications:    Current Facility-Administered Medications   Medication Dose Route Frequency Provider Last Rate Last Admin    methylergonovine (METHERGINE) injection 200 mcg  200 mcg IntraMUSCular PRN Jessica Castillo,         carboprost (HEMABATE) injection 250 mcg  250 mcg IntraMUSCular PRN Jessica Castillo, DO        miSOPROStol (CYTOTEC) tablet 400 mcg  400 mcg Buccal PRN Jessica Castillo,         tranexamic acid-NaCl IVPB premix 1,000 mg  1,000 mg IntraVENous Once PRN Jessica Castillo, DO        oxytocin (PITOCIN) 15 units in 250 mL infusion  87.3 patrice-units/min IntraVENous Continuous PRN Jessica Castillo DO        And    oxytocin (PITOCIN) 10 unit bolus from the bag  10 Units IntraVENous PRN Jessica Castillo,         oxytocin (PITOCIN) 15 units in 250 mL infusion  1-20 patrice-units/min IntraVENous Continuous Jessica Castillo, DO   Stopped at 24 0722    meperidine (DEMEROL) injection 25 mg  25 mg IntraVENous Q4H PRN Jessica Castillo, DO   25 mg at 24 0826    promethazine (PHENERGAN) injection 25 mg  25 mg IntraMUSCular Q6H PRN Jessica Castillo, DO   25 mg at 24 0409    naloxone 0.4 mg in 10 mL sodium chloride syringe   IntraVENous PRN Ysabel Valiente DO        ondansetron (ZOFRAN) injection 4 mg  4 mg IntraVENous

## 2024-08-02 NOTE — FLOWSHEET NOTE
Patient to mom baby ,oriented to room and unit ,clark draining clear yellow ,iv infusing ,call light with in reach

## 2024-08-02 NOTE — PROGRESS NOTES
Dr. Castillo called unit for pt update. Updated  On tracing and contraction pattern. Pitocin on 1, lina every 1-2 minutes. Difficult to trace contractions when pt is laying on her side. Orders for AROM and IUPC placement.

## 2024-08-02 NOTE — PROCEDURES
ovaries appeared normal. Uterus was returned to   the pelvis. The pelvis was irrigated, cleared of all clot and debris.   Fascia was closed with 0 PDS  in a running fashion. Subcutaneous tissue was irrigated, made hemostatic. Skin was closed with absorbable subcutaneous staples. Baby and mom to recovery room in stable condition. Placenta to pathology: Yes.    Jessica Castillo DO

## 2024-08-02 NOTE — PROGRESS NOTES
Updated Dr Castillo on Dr Jie MULLIGAN of fingertip and unable to AROM and place an IUPC. RN at bedside with multiple attempts to adjust the TOCO and changed out new TOCO monitor. RN at bedside palpating contractions every 1-2 minutes and patient in 10/10 pain requesting an epidural. Updated that pitocin is off d/t tachysystole and late decelerations. Also updated on high blood pressures, Dr Castillo states to get the patient an epidural and re-evaluate blood pressures before treating. Anesthesia called per patient request for an epidural.

## 2024-08-02 NOTE — PROGRESS NOTES
Dr. Castillo updated on reassuring FHT's, pt tachysystole and feeling the ctx's. Requesting IV pain medication at this time. Orders obtained.

## 2024-08-03 LAB
HCT VFR BLD AUTO: 32.7 % (ref 34–48)
HGB BLD-MCNC: 11 G/DL (ref 11.5–15.5)

## 2024-08-03 PROCEDURE — 1220000000 HC SEMI PRIVATE OB R&B

## 2024-08-03 PROCEDURE — 6370000000 HC RX 637 (ALT 250 FOR IP): Performed by: OBSTETRICS & GYNECOLOGY

## 2024-08-03 PROCEDURE — 85018 HEMOGLOBIN: CPT

## 2024-08-03 PROCEDURE — 6360000002 HC RX W HCPCS: Performed by: ANESTHESIOLOGY

## 2024-08-03 PROCEDURE — 36415 COLL VENOUS BLD VENIPUNCTURE: CPT

## 2024-08-03 PROCEDURE — 2580000003 HC RX 258: Performed by: ANESTHESIOLOGY

## 2024-08-03 PROCEDURE — 85014 HEMATOCRIT: CPT

## 2024-08-03 RX ADMIN — ACETAMINOPHEN 1000 MG: 500 TABLET ORAL at 16:07

## 2024-08-03 RX ADMIN — IBUPROFEN 800 MG: 800 TABLET, FILM COATED ORAL at 22:05

## 2024-08-03 RX ADMIN — KETOROLAC TROMETHAMINE 30 MG: 30 INJECTION, SOLUTION INTRAMUSCULAR at 08:11

## 2024-08-03 RX ADMIN — SODIUM CHLORIDE, PRESERVATIVE FREE 5 ML: 5 INJECTION INTRAVENOUS at 08:11

## 2024-08-03 RX ADMIN — KETOROLAC TROMETHAMINE 30 MG: 30 INJECTION, SOLUTION INTRAMUSCULAR at 01:59

## 2024-08-03 RX ADMIN — DOCUSATE SODIUM 100 MG: 100 CAPSULE, LIQUID FILLED ORAL at 22:05

## 2024-08-03 RX ADMIN — IBUPROFEN 800 MG: 800 TABLET, FILM COATED ORAL at 14:25

## 2024-08-03 RX ADMIN — DOCUSATE SODIUM 100 MG: 100 CAPSULE, LIQUID FILLED ORAL at 08:11

## 2024-08-03 RX ADMIN — ACETAMINOPHEN 1000 MG: 500 TABLET ORAL at 06:06

## 2024-08-03 ASSESSMENT — PAIN SCALES - GENERAL
PAINLEVEL_OUTOF10: 5
PAINLEVEL_OUTOF10: 5
PAINLEVEL_OUTOF10: 0
PAINLEVEL_OUTOF10: 1
PAINLEVEL_OUTOF10: 1

## 2024-08-03 ASSESSMENT — PAIN DESCRIPTION - ORIENTATION
ORIENTATION: LOWER

## 2024-08-03 ASSESSMENT — PAIN - FUNCTIONAL ASSESSMENT
PAIN_FUNCTIONAL_ASSESSMENT: ACTIVITIES ARE NOT PREVENTED

## 2024-08-03 ASSESSMENT — PAIN DESCRIPTION - LOCATION
LOCATION: ABDOMEN

## 2024-08-03 ASSESSMENT — PAIN DESCRIPTION - DESCRIPTORS
DESCRIPTORS: DISCOMFORT
DESCRIPTORS: DISCOMFORT;SORE
DESCRIPTORS: DISCOMFORT
DESCRIPTORS: DISCOMFORT

## 2024-08-03 NOTE — ANESTHESIA POSTPROCEDURE EVALUATION
Department of Anesthesiology  Postprocedure Note    Patient: Kimberly Manzo  MRN: [id number]  YOB: 1995  Date of evaluation: 8/3/2024    Procedure Summary       Date: 24 Room / Location: [address]    Anesthesia Start: 855 Anesthesia Stop: 1115    Procedures:        SECTION (Uterus)      Labor Analgesia Diagnosis:       Non-reassuring electronic fetal monitoring tracing      (Non-reassuring electronic fetal monitoring tracing [O36.8390])    Surgeons: Jessica Castillo DO Responsible Provider: Ysabel Valiente DO    Anesthesia Type: general, spinal, epidural ASA Status: 2 - Emergent            Anesthesia Type: No value filed.    Azra Phase I: Azra Score: 9    Azra Phase II:      Anesthesia Post Evaluation    Patient location during evaluation: bedside  Patient participation: complete - patient participated  Level of consciousness: awake and alert  Pain score: 0  Airway patency: patent  Nausea & Vomiting: no nausea and no vomiting  Cardiovascular status: blood pressure returned to baseline  Respiratory status: acceptable  Hydration status: euvolemic  Pain management: adequate        No notable events documented.

## 2024-08-03 NOTE — PROGRESS NOTES
Lab called regarding missing labs, stated the results are not crossing over and to call IT. Per [facility] [employer] updates site they are aware of issue as of 11:25.    Verbal results of H&H this morning Hgb 11.0, Hct 32.7.

## 2024-08-03 NOTE — FLOWSHEET NOTE
Patient admitted into room and oriented to surroundings. Introduced self and wrote this RN's name and phone extension on patient's white board. Phone and nurse's call light at patient's bedside and instructed to use for any needs. Patient instructed on new admission informational packet at bedside with information on infant testing to be done when infant is 24 hours old including ODH labs, 24 hours blood sugar, and CCHD. Patient instructed on mom baby unit policies and procedures including infant safety and fall prevention, of need to keep infant in bassinet for transport in hallways, and for infant to sleep alone, on back, in an empty bassinet. Patient also instructed on unit visitation policy and that one same support person 18 years old or older may stay overnight if desired. Patient verbalized understanding of all of the above. Patient reports receiving Tdap while pregnant.

## 2024-08-03 NOTE — PROGRESS NOTES
POD #1    Patient:  Kimberly Manzo     Admit Date:  8/1/2024  9:28 AM  Medical Record Number:  [id number]   Today's Date: 8/3/2024    S: No complaints; tolerating diet; ambulating well; voiding without difficulty; bm: no; flatus: yes ; pain meds appropriate: yes ; vaginal bleeding: thin lochia.    O:   Vitals:    08/02/24 1835 08/02/24 2000 08/02/24 2315 08/03/24 0300   BP:  116/69 111/61 117/74   Pulse: (!) 110 (!) 103 95    Resp: 16 16 16 16   Temp:  98.5 °F (36.9 °C) 98.3 °F (36.8 °C) 97.6 °F (36.4 °C)   TempSrc:  Oral Oral Oral   SpO2: 95%   97%   Weight:       Height:         Gen: A&O, cooperative, pleasant   Heart: RRR   Lungs: CTA b/l   Abd; soft, NT, non distended, +BS  Back: no CVA or paraspinal tenderness   Ext: No clubbing, cyanosis, edema:trace , no cords palpable, no calf tenderness   Neuro: intact   Inc: clean, dry, intact  Uterus: firm, well contracted, nt   Colleen pad: thin lochia    Recent Results (from the past 72 hour(s))   Urine Drug Screen    Collection Time: 08/01/24 10:00 AM   Result Value Ref Range    Amphetamine Screen, Ur NEGATIVE NEGATIVE    Barbiturate Screen, Ur NEGATIVE NEGATIVE    Benzodiazepine Screen, Urine NEGATIVE NEGATIVE    Cocaine Metabolite, Urine NEGATIVE NEGATIVE    Methadone Screen, Urine NEGATIVE NEGATIVE    Opiates, Urine NEGATIVE NEGATIVE    Phencyclidine, Urine NEGATIVE NEGATIVE    Cannabinoid Scrn, Ur POSITIVE (A) NEGATIVE    Oxycodone Screen, Ur NEGATIVE NEGATIVE    Fentanyl, Ur NEGATIVE NEGATIVE    Buprenorphine Urine NEGATIVE NEGATIVE    Test Information       These drug screen results are for medical purposes only and should not be considered definitive or confirmed.   THC-COOH, Quantitative, Urine    Collection Time: 08/01/24 10:00 AM   Result Value Ref Range    Thc-Cooh, Quantitative, Urine 37.6 (H) <15 ng/mL    THC Normalized, Quantitative, Urine 7.9 <15 ng/mL   Specimen Validity Panel    Collection Time: 08/01/24 10:00 AM   Result Value Ref Range    Integrity

## 2024-08-04 VITALS
DIASTOLIC BLOOD PRESSURE: 84 MMHG | TEMPERATURE: 98.4 F | OXYGEN SATURATION: 95 % | HEIGHT: 66 IN | WEIGHT: 210 LBS | HEART RATE: 108 BPM | BODY MASS INDEX: 33.75 KG/M2 | RESPIRATION RATE: 18 BRPM | SYSTOLIC BLOOD PRESSURE: 129 MMHG

## 2024-08-04 PROBLEM — R80.9 URINE PROTEIN INCREASED: Status: ACTIVE | Noted: 2024-08-04

## 2024-08-04 PROBLEM — O35.HXX0 SHORT FEMUR OF FETUS ON PRENATAL ULTRASOUND: Status: ACTIVE | Noted: 2024-08-04

## 2024-08-04 PROCEDURE — 6370000000 HC RX 637 (ALT 250 FOR IP): Performed by: OBSTETRICS & GYNECOLOGY

## 2024-08-04 RX ORDER — OXYCODONE HYDROCHLORIDE 5 MG/1
5 TABLET ORAL EVERY 6 HOURS PRN
Qty: 12 TABLET | Refills: 0 | Status: SHIPPED | OUTPATIENT
Start: 2024-08-04 | End: 2024-08-07

## 2024-08-04 RX ORDER — IBUPROFEN 800 MG/1
800 TABLET ORAL EVERY 8 HOURS PRN
Qty: 120 TABLET | Refills: 0 | Status: SHIPPED | OUTPATIENT
Start: 2024-08-04

## 2024-08-04 RX ADMIN — ACETAMINOPHEN 1000 MG: 500 TABLET ORAL at 09:44

## 2024-08-04 RX ADMIN — IBUPROFEN 800 MG: 800 TABLET, FILM COATED ORAL at 07:53

## 2024-08-04 RX ADMIN — IBUPROFEN 800 MG: 800 TABLET, FILM COATED ORAL at 16:44

## 2024-08-04 RX ADMIN — ACETAMINOPHEN 1000 MG: 500 TABLET ORAL at 01:05

## 2024-08-04 ASSESSMENT — PAIN DESCRIPTION - LOCATION
LOCATION: ABDOMEN;INCISION
LOCATION: ABDOMEN
LOCATION: ABDOMEN

## 2024-08-04 ASSESSMENT — PAIN DESCRIPTION - DESCRIPTORS
DESCRIPTORS: SORE
DESCRIPTORS: DISCOMFORT

## 2024-08-04 ASSESSMENT — PAIN DESCRIPTION - ORIENTATION
ORIENTATION: LOWER

## 2024-08-04 ASSESSMENT — PAIN SCALES - GENERAL
PAINLEVEL_OUTOF10: 4

## 2024-08-04 ASSESSMENT — PAIN - FUNCTIONAL ASSESSMENT: PAIN_FUNCTIONAL_ASSESSMENT: ACTIVITIES ARE NOT PREVENTED

## 2024-08-04 NOTE — PROGRESS NOTES
Patient instructed on discharge instructions with verbalized understanding. Questions answered prn. Patient was given a copy for her records.   Patient discharged to home in stable condition via wheelchair. Patient accompanied by significant other.

## 2024-08-04 NOTE — LACTATION NOTE
First time mom was formula feeding baby before he went to the nicu.  Spoke to mom regarding if she wishes to pump colostrum for baby now that he is the nicu.  Mom said she wants to exclusively pump breast milk for baby.  Discussed pumping versus direct breastfeeding and encouraged mom if she feels any desire to put baby to the breast to do so when ready as it helps with milk supply.   Set up Symphony breast pump with Primo-Lacto Colostrum Collection System at bedside for  Instructed on pump set use.  Instructed to pump every 2- 3 hrs for 15-20 min with hand massage for effective removal of colostrum.  Reviewed milk storage and pump cleaning guidelines-gave printed information.  Has bottles, labels, swabs.  Instructed on timing and dating labels. Helped mom pump colostrum. Mom has a electric breast pump en route for home. Encouraged to call with needs or concerns.

## 2024-08-04 NOTE — PLAN OF CARE
Problem: Postpartum  Goal: Experiences normal postpartum course  Description:  Postpartum OB-Pregnancy care plan goal which identifies if the mother is experiencing a normal postpartum course  8/3/2024 0117 by Nicole Ferguson RN  Outcome: Progressing     Problem: Postpartum  Goal: Appropriate maternal -  bonding  Description:  Postpartum OB-Pregnancy care plan goal which identifies if the mother and  are bonding appropriately  8/3/2024 0117 by Nicole Ferguson RN  Outcome: Progressing     Problem: Postpartum  Goal: Establishment of infant feeding pattern  Description:  Postpartum OB-Pregnancy care plan goal which identifies if the mother is establishing a feeding pattern with their   8/3/2024 0117 by Nicole Ferguson RN  Outcome: Progressing     Problem: Postpartum  Goal: Incisions, wounds, or drain sites healing without S/S of infection  8/3/2024 0117 by Nicole Ferguson RN  Outcome: Progressing     Problem: Pain  Goal: Verbalizes/displays adequate comfort level or baseline comfort level  8/3/2024 0117 by Nicole Ferguson RN  Outcome: Progressing  Flowsheets (Taken 2024 2315)  Verbalizes/displays adequate comfort level or baseline comfort level:   Encourage patient to monitor pain and request assistance   Assess pain using appropriate pain scale   Administer analgesics based on type and severity of pain and evaluate response   Implement non-pharmacological measures as appropriate and evaluate response   Consider cultural and social influences on pain and pain management   Notify Licensed Independent Practitioner if interventions unsuccessful or patient reports new pain     
  Problem: Postpartum  Goal: Experiences normal postpartum course  Description:  Postpartum OB-Pregnancy care plan goal which identifies if the mother is experiencing a normal postpartum course  Outcome: Progressing     Problem: Postpartum  Goal: Appropriate maternal -  bonding  Description:  Postpartum OB-Pregnancy care plan goal which identifies if the mother and  are bonding appropriately  Outcome: Progressing     Problem: Postpartum  Goal: Establishment of infant feeding pattern  Description:  Postpartum OB-Pregnancy care plan goal which identifies if the mother is establishing a feeding pattern with their   Outcome: Progressing     Problem: Postpartum  Goal: Incisions, wounds, or drain sites healing without S/S of infection  Outcome: Progressing     Problem: Pain  Goal: Verbalizes/displays adequate comfort level or baseline comfort level  Outcome: Progressing  Flowsheets  Taken 8/3/2024 2321 by Nicole Ferguson RN  Verbalizes/displays adequate comfort level or baseline comfort level:   Encourage patient to monitor pain and request assistance   Assess pain using appropriate pain scale   Administer analgesics based on type and severity of pain and evaluate response   Implement non-pharmacological measures as appropriate and evaluate response   Consider cultural and social influences on pain and pain management   Notify Licensed Independent Practitioner if interventions unsuccessful or patient reports new pain    Problem: ABCDS Injury Assessment  Goal: Absence of physical injury  Outcome: Progressing     
  Problem: Vaginal Birth or  Section  Goal: Fetal and maternal status remain reassuring during the birth process  Description:  Birth OB-Pregnancy care plan goal which identifies if the fetal and maternal status remain reassuring during the birth process  [id number] by Yumiko Luna RN  Outcome: Progressing     Problem: Postpartum  Goal: Experiences normal postpartum course  Description:  Postpartum OB-Pregnancy care plan goal which identifies if the mother is experiencing a normal postpartum course  Outcome: Progressing     Problem: Postpartum  Goal: Appropriate maternal -  bonding  Description:  Postpartum OB-Pregnancy care plan goal which identifies if the mother and  are bonding appropriately  Outcome: Progressing     Problem: Postpartum  Goal: Establishment of infant feeding pattern  Description:  Postpartum OB-Pregnancy care plan goal which identifies if the mother is establishing a feeding pattern with their   Outcome: Progressing     Problem: Postpartum  Goal: Incisions, wounds, or drain sites healing without S/S of infection  Outcome: Progressing     Problem: Pain  Goal: Verbalizes/displays adequate comfort level or baseline comfort level  Outcome: Progressing     
  Problem: Vaginal Birth or  Section  Goal: Fetal and maternal status remain reassuring during the birth process  Description:  Birth OB-Pregnancy care plan goal which identifies if the fetal and maternal status remain reassuring during the birth process  Outcome: Progressing     Problem: Postpartum  Goal: Experiences normal postpartum course  Description:  Postpartum OB-Pregnancy care plan goal which identifies if the mother is experiencing a normal postpartum course  8/3/2024 100 by Aye Moses RN  Outcome: Progressing     Problem: Postpartum  Goal: Appropriate maternal -  bonding  Description:  Postpartum OB-Pregnancy care plan goal which identifies if the mother and  are bonding appropriately  8/3/2024 100 by Aye Moses RN  Outcome: Progressing     Problem: Postpartum  Goal: Establishment of infant feeding pattern  Description:  Postpartum OB-Pregnancy care plan goal which identifies if the mother is establishing a feeding pattern with their   8/3/2024 100 by Aye Moses RN  Outcome: Progressing     Problem: Postpartum  Goal: Incisions, wounds, or drain sites healing without S/S of infection  8/3/2024 100 by Aye Moses RN  Outcome: Progressing  Flowsheets (Taken 8/3/2024 0811)  Incisions, Wounds, or Drain Sites Healing Without Sign and Symptoms of Infection: TWICE DAILY: Assess and document dressing/incision, wound bed, drain sites and surrounding tissue     Problem: Pain  Goal: Verbalizes/displays adequate comfort level or baseline comfort level  8/3/2024 1006 by Aye Moses RN  Outcome: Progressing  Flowsheets (Taken 8/3/2024 0811)  Verbalizes/displays adequate comfort level or baseline comfort level:   Encourage patient to monitor pain and request assistance   Assess pain using appropriate pain scale     Problem: ABCDS Injury Assessment  Goal: Absence of physical injury  Outcome: Progressing     
Pt states ctx pain lessened after dose of terbutaline. Readjusted toco and instructed patient to call out if pain starts again.   
response  Taken 8/4/2024 0753  Verbalizes/displays adequate comfort level or baseline comfort level:   Encourage patient to monitor pain and request assistance   Assess pain using appropriate pain scale   Administer analgesics based on type and severity of pain and evaluate response     Problem: ABCDS Injury Assessment  Goal: Absence of physical injury  8/4/2024 1007 by Aye Moses RN  Outcome: Progressing

## 2024-08-04 NOTE — PROGRESS NOTES
SUBJECTIVE:   Patient without complaint    OBJECTIVE:   /84   Pulse (!) 108   Temp 98.4 °F (36.9 °C) (Oral)   Resp 18   Ht 1.676 m (5' 6\")   Wt 95.3 kg (210 lb)   SpO2 95%   Breastfeeding Unknown   BMI 33.89 kg/m²    Incision clean, dry, intact   Abdomen soft, nontender    ASSESSMENT/PLAN:   Postoperative Day #2   Discharge home with precaution after bowel movement per pt request   Follow-up 1 week in office   Post operative precautions given       Musa Perera MD 8/4/2024 1:13 PM

## 2024-08-04 NOTE — DISCHARGE INSTRUCTIONS
Follow-up with your OB doctor in 1 week for  incision check and in  6 weeks for pelvic exam unless otherwise instructed.   Call office for an appointment.    For breastfeeding support, you can contact our lactation specialists at [phone] or [phone]    DIET  Eat a well balanced diet focusing on foods high in fiber and protein  Drink plenty of fluids especially water.  To avoid constipation you may take a mild stool softener as recommended by your doctor or midwife.    ACTIVITY  Gradually increase your activity.  Resume exercise regimen only after advised by your doctor or midwife.  Avoid lifting anything heavier than your baby or a gallon of milk for SIX weeks.   Avoid driving until your doctor or midwife has given their approval.  Rise slowly from a lying to sitting and then a standing position.  Climb stairs one at a time.  Use caution when carrying your baby up and down the stairs.  No sexual activity for 6 weeks or until advised by your doctor - Nothing in vagina: intercourse, tampons, or douching.   Be prepared to discuss family planning at your follow-up OB visit.   You may feel tired or have a lack of energy.  You may continue your prenatal vitamin to replenish nutrients post delivery.  Nap when baby naps to catch up on sleep.  May return to work or school in 6 weeks or as directed by OB.     EMOTIONS  You may feed cole, sad, teary, & overwhelmed.  Contact your OB provider if you feel you may be showing signs of postpartum depression, or have thoughts of harming yourself or your infant.  If infant will not stop crying, contact another adult for help or place infant in their crib on their back and take a break.  NEVER shake your infant.      BLEEDING  Vaginal bleeding will decrease in amount over the next few weeks.  You will notice that as your activity increases, your flow may increase.  This is your body's way of telling you, you need to take things easier and rest more often.  Call your

## 2024-08-05 LAB
CHLAMYDIA DNA UR QL NAA+PROBE: NEGATIVE
N GONORRHOEA DNA UR QL NAA+PROBE: NEGATIVE
SPECIMEN DESCRIPTION: NORMAL

## 2024-08-12 LAB — SURGICAL PATHOLOGY REPORT: NORMAL

## 2024-10-15 ENCOUNTER — OFFICE VISIT (OUTPATIENT)
Dept: PRIMARY CARE CLINIC | Age: 29
End: 2024-10-15
Payer: MEDICAID

## 2024-10-15 VITALS
BODY MASS INDEX: 32.14 KG/M2 | WEIGHT: 200 LBS | RESPIRATION RATE: 16 BRPM | SYSTOLIC BLOOD PRESSURE: 120 MMHG | DIASTOLIC BLOOD PRESSURE: 70 MMHG | HEIGHT: 66 IN | HEART RATE: 68 BPM | TEMPERATURE: 97.6 F | OXYGEN SATURATION: 98 %

## 2024-10-15 DIAGNOSIS — F32.A ANXIETY AND DEPRESSION: Primary | ICD-10-CM

## 2024-10-15 DIAGNOSIS — F41.9 ANXIETY AND DEPRESSION: Primary | ICD-10-CM

## 2024-10-15 PROCEDURE — G8427 DOCREV CUR MEDS BY ELIG CLIN: HCPCS | Performed by: INTERNAL MEDICINE

## 2024-10-15 PROCEDURE — 1036F TOBACCO NON-USER: CPT | Performed by: INTERNAL MEDICINE

## 2024-10-15 PROCEDURE — G8484 FLU IMMUNIZE NO ADMIN: HCPCS | Performed by: INTERNAL MEDICINE

## 2024-10-15 PROCEDURE — G8417 CALC BMI ABV UP PARAM F/U: HCPCS | Performed by: INTERNAL MEDICINE

## 2024-10-15 PROCEDURE — 99213 OFFICE O/P EST LOW 20 MIN: CPT | Performed by: INTERNAL MEDICINE

## 2024-10-15 RX ORDER — BUPROPION HYDROCHLORIDE 300 MG/1
300 TABLET ORAL EVERY MORNING
Qty: 30 TABLET | Refills: 3 | Status: SHIPPED | OUTPATIENT
Start: 2024-10-15

## 2024-10-15 ASSESSMENT — ENCOUNTER SYMPTOMS
SHORTNESS OF BREATH: 0
VOMITING: 0
DIARRHEA: 0
COUGH: 0
NAUSEA: 0
ABDOMINAL PAIN: 0

## 2024-10-15 NOTE — PROGRESS NOTES
SUBJECTIVE  Kimberly Manzo is a 29 y.o. female established was seen In the office  for evaluation.    HPI/Chief C/O:  Chief Complaint   Patient presents with    Anxiety     Patient is here for anxiety    Had baby 3 month ago ,not breast feeding   Allergies   Allergen Reactions    Dust Mite Extract     Pollen Extract     Adhesive Tape Rash    Prednisone Rash       ROS:  Review of Systems   Respiratory:  Negative for cough and shortness of breath.         Negative for Hemoptysis   Cardiovascular:  Negative for chest pain.   Gastrointestinal:  Negative for abdominal pain, diarrhea, nausea and vomiting.   Endocrine: Negative for polydipsia, polyphagia and polyuria.   Genitourinary:  Negative for dysuria and hematuria.   Skin:  Negative for rash.   Neurological:  Negative for tremors and seizures.        Past Medical/Surgical Hx;  Reviewed with patient      Diagnosis Date    ADHD (attention deficit hyperactivity disorder)     Bipolar 1 disorder (HCC)     Depression     Pneumonia     PTSD (post-traumatic stress disorder)      Past Surgical History:   Procedure Laterality Date     SECTION N/A 2024     SECTION performed by Jessica Castillo DO at [facility]    TYMPANOSTOMY TUBE PLACEMENT      WISDOM TOOTH EXTRACTION         Past Family Hx:  Reviewed with patient      Family history unknown: Yes       Social Hx:  Reviewed with patient  Social History     Tobacco Use    Smoking status: Former     Types: E-Cigarettes    Smokeless tobacco: Never    Tobacco comments:     Havent smoked cigarettes in over 7 years, quit vaping as soon as i found out i was pregnant.   Substance Use Topics    Alcohol use: No     Comment: rare       OBJECTIVE  /70   Pulse 68   Temp 97.6 °F (36.4 °C)   Resp 16   Ht 1.676 m (5' 6\")   Wt 90.7 kg (200 lb)   SpO2 98%   Breastfeeding No   BMI 32.28 kg/m²     Problem List:  Kimberly does not have any pertinent problems on file.    PHYS EX:  Physical Exam  Eyes:

## 2024-11-12 ENCOUNTER — TELEPHONE (OUTPATIENT)
Dept: PRIMARY CARE CLINIC | Age: 29
End: 2024-11-12

## 2024-11-16 NOTE — CARE COORDINATION
In order to ensure appropriate transition and discharge planning is in place, the following documents have been transmitted to [facility] and [facility]  as the new outpatient provider:     The d/c diagnosis was transmitted to the next care provider   The reason for hospitalization was transmitted to the next care provider   The d/c medications (dosage and indication) were transmitted to the next care provider    The continuing care plan was transmitted to the next care provider 37

## 2024-12-03 NOTE — ED NOTES
December 10, 2024        Sonia Angulo  [address]  [facility] [address]       Dear Ms. Angulo,    Please review the enclosed prep instructions for your procedure with Holly Encarnacion MD.    Procedure(s):  Colonoscopy   Date of Procedure: Thursday, January 16, 2025  Time of Procedure: You will be informed by Pre-Admit Test Center of your arrival time 2-3 business days prior to your procedure   Arrival Time: You will be informed by Pre-Admit Test Center of your arrival time 2-3 business days prior to your procedure   Location:  [address]  [address]  [phone]  : [place] [phone]    Please read these instructions very carefully at least 7 days prior to your procedure.     If there are questions about these instructions, please call the office at [phone]. Prior to the procedure, the procedure center will be calling to go over your health history and medications to be taken on the day of the procedure. If you need to cancel or reschedule, please give the office a call within 3 days prior to the procedure.     Medication Holds     ANTIPLATELET / ANTICOAGULATION: None     MEDICATION HOLDS:  N/A    Please contact your prescribing provider with any questions or concerns on medication holds.    Important Information:  You will not be allowed to drive home after the procedure due to the sedation. Please confirm you have an escort to take you home following the procedure. You cannot take a taxi cab, Uber, Lyft, ride share, bus, or other public transport home by yourself. Patients without an appropriate ride home will be cancelled    You are scheduled with Monitored Anesthesia Care (MAC) sedation, upon discharge you will need to have a legal adult (18 years or older) stay with you overnight the day of your procedure or your procedure will be canceled.    Colonoscopy Instructions - Split Nulytely/Golytely (or  Accepted to [facility] by EVA Martinez for Dr. Sinan Berg. [address] [facility] [place] Will advise primary RN Milka Schultz in main ED. Admitting to be notified.      Meghann Flores RN  08/01/22 4915 Generic)  Pre-Procedure Instructions  Important:  You will need someone to drive you home and remain with you or check on you up to 24 hours after you go home.  Aspirin does not need to be stopped or held prior to procedure.   If you take blood thinners (I.e. Warfarin, Plavix, Xarelto, Eliquis, Aggrenox, Effient, Pradaxa) please call your primary care provider/cardiologist at least 10 days before you procedure for instructions on how many days before to hold your blood thinner.  If you are diabetic and take insulin, please call your primary care provider for instructions. Usually, it is recommended that you take half of your insulin dose the evening before and half of your insulin dose the morning of your procedure.    The following medications need to be stopped 7 days prior to procedure. Please contact your prescribing MD to see if an insulin bridge would be needed prior to your procedure.   Semaglutide: Wegovy (SubQ Weekly)  Ozempic (SubQ Weekly), Rybelsus (oral tablet daily)  Dulaglutide-Trulicity (SubQ Weekly)  Exenatide: Byetta (SubQ twice daily). Bydureon BCise (SubQ Weekly)  Liraglutide- Saxenda or Victoza (SubQ Daily)  Tirzepatide- Mounjaro (SubQ Weekly)  Combination Products (Contact prescriber for bridge insulin)  Liraglutide and insulin degludec: Xultophy (SubQ daily)  Lixisenatide and insulin galargine: Soliqua (SubQ daily)  If you are taking the diet drug Phentermine, stop taking it 7 days before your procedure.   The preadmission nurse will tell you which medications to take the morning of you procedure with a small sip of water. Do not take any other medications until after your procedure.      7 Days prior to your procedure  Please ensure you've picked up your bowel prep at your pharmacy prior to your procedure in case there is a problem with coverage of the medication by your insurance.  If possible, try to avoid non-steroidal anti-inflammatory drugs (Ibuprofen, Advil, Motrin, Aleve, Naproxen  etc.).   Do not eat popcorn, seeds, nuts or whole kernel corn.     5 Days Before Your Procedure  Do not eat products with [place] (a fat substitute found in [employer] and Pringles Fat-Free chips) for 5 days before the procedure.  Do not take iron supplements.    1 Day before your procedure  No solid food.  No alcohol.  Clear liquids ALL DAY. If you can see through it, you can drink it. Examples are clear broth or consommé, fruit juices without pulp (no orange or tomato), sports drinks (Gatorade, Propel etc.), Jell-O (no fruit added), coffee or tea (sweeteners are ok, no milk or cream), soda or non-alcoholic carbonated drinks, popsicles, water, and clear hard candies. Avoid red and purple colors.   It is important to try and stay hydrated during the day by drinking fluids. A solution such as Gatorade, or a similar sports drink, will help you to stay hydrated.  In the morning, mix the Nulytely prep with the flavor packet and one gallon of water, shake well to mix, and refrigerate to chill. Do not further dilute the prep in any way.  At 5:00 PM, Drink 8 ounces of the prep solution every 10 minutes until ½ of the prep is completed. Place the other half in the refrigerator. Then drink 16 oz of a clear liquid.   You can still continue to be on the clear liquid diet while prepping.      Day of the procedure  No solid food. No alcohol.  6 hours prior to leaving your house drink the remaining portion of the solution. Drink 8 ounces every 10 minutes until the prep is completed. Then drink 16 oz of a clear liquid.  You can take your medications as instructed during phone call with procedure center staff, with a sip of water up to 4 hours prior to your scheduled procedure.   You should not drink or take anything by mouth 2 hours prior to your procedure.  Your stools should have a clear to see-through cloudy yellow appearance with no formed substance. If your stools are darker or have formed substance, please call  the office and speak with a nurse who will get further instructions from your physician.  Please make sure to drink the full prep.    Prepping times and instructions can be altered depending on time of procedure.  Please call office and ask to speak to a nurse to discuss.    Frequently Asked Questions    What is a colonoscopy?   A colonoscopy is a procedure where we can examine your large intestine for abnormalities. We use an endoscope which is a flexible tube as thick as your finger, that has a camera and light at the tip     How long is the colon?   The average length of the colon is 4 to 5 feet.     Why do I need to take the preparation and clear liquid diet?  The most important part in a successful exam is the preparation. It is important to clean your colon completely prior to the exam. If there is still remaining stool in the colon, it can prolong your procedure, and we may not be able to visualize the entire colon and lesions could be missed. If you have a substantial amount of stool remaining, the procedure may be terminated, and a repeat or alternative prep may be required.        For your information, studies have shown taking Nulytely the evening prior and morning of the procedure as 2 separate doses help improve the cleansing of the colon. Therefore, prepping times and instructions can be altered depending on time of procedure.    Is there an alternative to the Nulytely?  In the past, most preps were performed with Fleets Phospho-Soda. However recent studies have shown the risk of causing permanent kidney damage/failure. Due to the risk of permanent kidney damage with the Fleet Phospho-Soda, most gastroenterologists have stopped using it.     There are some alternatives to Nulytely called Moviprep and Suprep. They are less in volume you have to drink but could cost more than Nulytely. If you would like the alternative, then please contact my office.      Can I take all my medications?  Most medications  can be continued without problems.  If you have questions, please call the office. Blood thinners and anti-diabetic medications may need modification prior as detailed above.    What can I expect the day of the procedure?  You will arrive at the facility where you are scheduled. We have you arrive early since you will need to fill out your information. An IV will be placed so we can give you medications. I will talk to you and answer any questions you may have before the procedure. Once inside the procedure room, sedation will be given to help you relax.    You will usually lie on your left side. I will then advance the scope to the end of your large intestine. During the procedure it is normal to feel some pressure, bloating or cramping. Careful examination will be performed throughout your colon. The entire procedure takes approximately 30 minutes, however this can be prolonged in complicated cases.    Once the procedure is complete, you will be brought to the recovery room until you are stable to leave. You should plan on being at the procedure site for 2 to 3 hours.     Will I be completely sedated for the procedure?  Choice of sedation - Moderate Intravenous OR Monitored Anesthesia Care, is based upon past medical history and current medication use. The doctor performing the procedure will make this decision. The goal is to keep you comfortable during the procedure.    If you have questions regarding sedation, please call the office to discuss further.  In most cases patients receive conscious sedation, meaning that they will be in a “twilight sleep”. They will breathe on their own and will be able to follow commands. Since most of the discomfort is with inserting the scope, this is when the heaviest sedation is used. It is not unusual for people to be awake for part of the exam. The goal of the sedation is to keep you as comfortable as possible.     Patients that have a history of taking chronic medications  such as pain medication, anti-anxiety medications, or alcohol use may build up a tolerance to the sedation. This may make it difficult to fully sedate you for the procedure.    What if something abnormal is found during the colonoscopy?  Most polyps can be removed at the time of the colonoscopy. Biopsies, tissue samples, can be obtained during the exam.     What are polyps?  Polyps are abnormal growth of colon tissue. A majority are benign or non-cancerous. All polyps that are removed are sent to the lab for analysis. Some polyps are precancerous, which is why it is important to remove them while they are small and non-cancerous. Polyps can range from a couple of millimeters to a couple of cm.     How are the polyps removed?  The polyps can be removed by biopsy instruments. Some polyps are removed with a wire snare and cautery. Cautery produces an electrical current to help burn and remove the polyps. You should not feel any pain with removal of the polyps. The polyps are then retrieved and sent to a lab to be analyzed.     What will happen after the procedure?  I will discuss the findings with you prior to discharge. You will receive a phone call or letter from the office within 10-14 business days with the results and recommendations. Your family history, your personal history, and the number/size and type of polyps found on most recent exam will determine when you may need a repeat colonoscopy.     Even if you feel alert after the procedure, your judgment and reflexes may be impaired the rest of the day. You should not drive, work heavy machinery, or perform any other task that requires your full attention.  You may have bloating and cramping after the exam. This usually is improved with passing of gas.  Normally you may resume a regular diet after the procedure is completed. If you are on a blood thinner, this may be held if a large polyp is removed. You will be provided with clear instructions regarding when to  resume your blood thinner medications.    Why do I need someone to accompany me to the exam?  Because you are given a sedative, you need someone you know to drive you home after the exam. If you are taking a bus, taxi or van service a responsible adult you know must accompany you. If this is a problem, the colonoscopy can be attempted without any sedation, or inpatient observation may be recommended.     What are the complications of the exam?  Colonoscopies are relatively safe, however complications can occur. Some, but not all, of the risk are discussed below.  Some patients may have an adverse reaction with the sedation or complications from heart and lung disease. There is also the risk of causing bleeding and perforation (poking a hole in the colon). If these complications do occur, they may need surgical treatment rarely. There is also a risk of missed lesions.     After the procedure, if you have any abdominal pain, fever, chills, or rectal bleeding it is important to notify me or seek immediate medical attention.  It is important to know that bleeding can occur even several days after the procedure.       INSURANCE COVERAGE REGARDING PAYMENT  FOR YOUR COLONOSCOPY        Colon Cancer is the second leading cause of death among cancers, per the American Cancer Society. It is preventable. Early detection is the key. Your doctor will determine which tests need to be done for prevention and/or treatment. However, colonoscopies can be performed for several reasons:     Screening To screen for any problems within the colon. In this case, the patient is not symptomatic and does not have a personal history of previous colon cancer/condition or abnormal findings. Billed as screening.   Surveillance To monitor for a previously diagnosed colon condition (such as polyps) or when performed at more frequent intervals than every ten years because the patient has a personal/family history of colonic polyps or colon cancer.  Billed as diagnostic.   Diagnostic Patient with symptoms, used to evaluate the colon. Billed as diagnostic.       If during a screening colonoscopy, our physician finds an abnormality, performs a biopsy or polypectomy (removal of polyp), your insurance company may consider the procedure to be a diagnostic exam and no longer a screening procedure.     Every insurance company is different. We encourage you to call your insurance company regarding plan benefits. Generally, screening benefits and diagnostic benefits may be paid at different levels. Charges associated with anesthesia or type of facility may also be processed differently. This varies with each insurance company, so we want you to be aware of this prior to your procedure. You do not have to call your insurance company if you have Medicare. For an estimate of potential charges, you may call the [facility] Patient Contact Center at [phone], option 5.     The authorization staff at [facility] will contact your insurance company to check precertification requirements for your colonoscopy. However, precertification, which serves as notification is never a guarantee of payment. If you have questions regarding precertification for your procedure, please contact your insurance company.

## 2025-06-18 ENCOUNTER — HOSPITAL ENCOUNTER (EMERGENCY)
Age: 30
Discharge: HOME | End: 2025-06-18
Attending: STUDENT IN AN ORGANIZED HEALTH CARE EDUCATION/TRAINING PROGRAM
Payer: MEDICAID

## 2025-06-18 ENCOUNTER — APPOINTMENT (OUTPATIENT)
Dept: GENERAL RADIOLOGY | Age: 30
End: 2025-06-18
Payer: MEDICAID

## 2025-06-18 VITALS
DIASTOLIC BLOOD PRESSURE: 70 MMHG | SYSTOLIC BLOOD PRESSURE: 103 MMHG | HEART RATE: 70 BPM | RESPIRATION RATE: 18 BRPM | OXYGEN SATURATION: 97 % | TEMPERATURE: 97.9 F

## 2025-06-18 DIAGNOSIS — R06.02 SHORTNESS OF BREATH: Primary | ICD-10-CM

## 2025-06-18 LAB
ALBUMIN SERPL-MCNC: 4.6 G/DL (ref 3.5–5.2)
ALP SERPL-CCNC: 101 U/L (ref 35–104)
ALT SERPL-CCNC: 15 U/L (ref 0–35)
ANION GAP SERPL CALCULATED.3IONS-SCNC: 12 MMOL/L (ref 7–16)
AST SERPL-CCNC: 17 U/L (ref 0–35)
BASOPHILS # BLD: 0.04 K/UL (ref 0–0.2)
BASOPHILS NFR BLD: 0 % (ref 0–2)
BILIRUB SERPL-MCNC: 0.6 MG/DL (ref 0–1.2)
BNP SERPL-MCNC: 94 PG/ML (ref 0–125)
BUN SERPL-MCNC: 11 MG/DL (ref 6–20)
CALCIUM SERPL-MCNC: 10 MG/DL (ref 8.6–10)
CHLORIDE SERPL-SCNC: 102 MMOL/L (ref 98–107)
CO2 SERPL-SCNC: 25 MMOL/L (ref 22–29)
CREAT SERPL-MCNC: 0.9 MG/DL (ref 0.5–1)
D-DIMER QUANTITATIVE: <200 NG/ML DDU (ref 0–230)
EOSINOPHIL # BLD: 0.01 K/UL (ref 0.05–0.5)
EOSINOPHILS RELATIVE PERCENT: 0 % (ref 0–6)
ERYTHROCYTE [DISTWIDTH] IN BLOOD BY AUTOMATED COUNT: 11.9 % (ref 11.5–15)
GFR, ESTIMATED: >90 ML/MIN/1.73M2
GLUCOSE SERPL-MCNC: 110 MG/DL (ref 74–99)
HCT VFR BLD AUTO: 43.8 % (ref 34–48)
HGB BLD-MCNC: 14.8 G/DL (ref 11.5–15.5)
IMM GRANULOCYTES # BLD AUTO: 0.05 K/UL (ref 0–0.58)
IMM GRANULOCYTES NFR BLD: 1 % (ref 0–5)
LYMPHOCYTES NFR BLD: 1.98 K/UL (ref 1.5–4)
LYMPHOCYTES RELATIVE PERCENT: 18 % (ref 20–42)
MCH RBC QN AUTO: 27.7 PG (ref 26–35)
MCHC RBC AUTO-ENTMCNC: 33.8 G/DL (ref 32–34.5)
MCV RBC AUTO: 81.9 FL (ref 80–99.9)
MONOCYTES NFR BLD: 0.41 K/UL (ref 0.1–0.95)
MONOCYTES NFR BLD: 4 % (ref 2–12)
NEUTROPHILS NFR BLD: 77 % (ref 43–80)
NEUTS SEG NFR BLD: 8.42 K/UL (ref 1.8–7.3)
PLATELET # BLD AUTO: 457 K/UL (ref 130–450)
PMV BLD AUTO: 8.9 FL (ref 7–12)
POTASSIUM SERPL-SCNC: 4 MMOL/L (ref 3.5–5.1)
PROT SERPL-MCNC: 7.9 G/DL (ref 6.4–8.3)
RBC # BLD AUTO: 5.35 M/UL (ref 3.5–5.5)
SODIUM SERPL-SCNC: 139 MMOL/L (ref 136–145)
TROPONIN I SERPL HS-MCNC: <6 NG/L (ref 0–14)
WBC OTHER # BLD: 10.9 K/UL (ref 4.5–11.5)

## 2025-06-18 PROCEDURE — 6370000000 HC RX 637 (ALT 250 FOR IP): Performed by: STUDENT IN AN ORGANIZED HEALTH CARE EDUCATION/TRAINING PROGRAM

## 2025-06-18 PROCEDURE — 99285 EMERGENCY DEPT VISIT HI MDM: CPT

## 2025-06-18 PROCEDURE — 85025 COMPLETE CBC W/AUTO DIFF WBC: CPT

## 2025-06-18 PROCEDURE — 93005 ELECTROCARDIOGRAM TRACING: CPT | Performed by: STUDENT IN AN ORGANIZED HEALTH CARE EDUCATION/TRAINING PROGRAM

## 2025-06-18 PROCEDURE — 80053 COMPREHEN METABOLIC PANEL: CPT

## 2025-06-18 PROCEDURE — 71045 X-RAY EXAM CHEST 1 VIEW: CPT

## 2025-06-18 PROCEDURE — 84484 ASSAY OF TROPONIN QUANT: CPT

## 2025-06-18 PROCEDURE — 85379 FIBRIN DEGRADATION QUANT: CPT

## 2025-06-18 PROCEDURE — 83880 ASSAY OF NATRIURETIC PEPTIDE: CPT

## 2025-06-18 RX ORDER — KETOROLAC TROMETHAMINE 30 MG/ML
15 INJECTION, SOLUTION INTRAMUSCULAR; INTRAVENOUS ONCE
Status: DISCONTINUED | OUTPATIENT
Start: 2025-06-18 | End: 2025-06-18 | Stop reason: HOSPADM

## 2025-06-18 RX ORDER — IPRATROPIUM BROMIDE AND ALBUTEROL SULFATE 2.5; .5 MG/3ML; MG/3ML
1 SOLUTION RESPIRATORY (INHALATION) ONCE
Status: COMPLETED | OUTPATIENT
Start: 2025-06-18 | End: 2025-06-18

## 2025-06-18 RX ORDER — METHYLPREDNISOLONE 4 MG/1
TABLET ORAL
Qty: 1 KIT | Refills: 0 | Status: SHIPPED | OUTPATIENT
Start: 2025-06-18 | End: 2025-06-24

## 2025-06-18 RX ADMIN — IPRATROPIUM BROMIDE AND ALBUTEROL SULFATE 1 DOSE: 2.5; .5 SOLUTION RESPIRATORY (INHALATION) at 11:39

## 2025-06-18 ASSESSMENT — LIFESTYLE VARIABLES
HOW MANY STANDARD DRINKS CONTAINING ALCOHOL DO YOU HAVE ON A TYPICAL DAY: PATIENT DOES NOT DRINK
HOW OFTEN DO YOU HAVE A DRINK CONTAINING ALCOHOL: NEVER

## 2025-06-18 ASSESSMENT — ENCOUNTER SYMPTOMS
SINUS PRESSURE: 0
EYE DISCHARGE: 0
EYE PAIN: 0
SHORTNESS OF BREATH: 1
DIARRHEA: 0
ABDOMINAL DISTENTION: 0
WHEEZING: 0
NAUSEA: 0
COUGH: 0
EYE REDNESS: 0
SORE THROAT: 0
BACK PAIN: 0
VOMITING: 0

## 2025-06-18 ASSESSMENT — PAIN SCALES - GENERAL: PAINLEVEL_OUTOF10: 8

## 2025-06-18 ASSESSMENT — PAIN DESCRIPTION - LOCATION: LOCATION: CHEST;BACK

## 2025-06-18 ASSESSMENT — PAIN DESCRIPTION - PAIN TYPE: TYPE: ACUTE PAIN

## 2025-06-18 ASSESSMENT — PAIN DESCRIPTION - ONSET: ONSET: ON-GOING

## 2025-06-18 ASSESSMENT — PAIN DESCRIPTION - DESCRIPTORS: DESCRIPTORS: ACHING;DISCOMFORT;SORE

## 2025-06-18 ASSESSMENT — PAIN - FUNCTIONAL ASSESSMENT: PAIN_FUNCTIONAL_ASSESSMENT: 0-10

## 2025-06-18 ASSESSMENT — PAIN DESCRIPTION - FREQUENCY: FREQUENCY: CONTINUOUS

## 2025-06-18 NOTE — ED PROVIDER NOTES
Department of Emergency Medicine   ED  Provider Note  Admit Date/RoomTime: 6/18/2025 10:00 AM  ED Room: 10/10              [facility]     Kimberly Manzo is a 29 y.o. female with a PMHx significant for anxiety, ADHD who presents for evaluation of shortness of breath, chest discomfort, beginning prior to arrival. The complaint has been persistent, moderate in severity, and worsened by nothing.   The patient states that she has been dealing with some shortness of breath for the last few days.  States that she feels it started 4 days ago however was having intermittent pain last week.  Does endorse history of vaping in the past, states that she stopped vaping secondary to this.  Notes that she is also been noticing some palpitations and has been having some diarrhea.  Notes that she was seen and evaluated at urgent care 4 days ago, chest x-ray was negative at that point in time.  She was given albuterol inhaler.  Notes albuterol is helping for the first 2 days however for the last day or so has not made any difference.  She is concerned that she may have a blood clot as she was having pain prior as well.  Denies any recent long car or plane rides, no leg swelling.  Patient denies any risk factors, no recent long car rides or plane rides, she has not encountered hormones, no recent surgeries.     Review of Systems   Constitutional:  Positive for fatigue. Negative for chills and fever.   HENT:  Negative for ear pain, sinus pressure and sore throat.    Eyes:  Negative for pain, discharge and redness.   Respiratory:  Positive for shortness of breath. Negative for cough and wheezing.    Cardiovascular:  Positive for palpitations. Negative for chest pain.   Gastrointestinal:  Negative for abdominal distention, diarrhea, nausea and vomiting.   Genitourinary:  Negative for dysuria and frequency.   Musculoskeletal:  Negative for arthralgias and back pain.   Skin:  Negative for rash and wound.   Neurological:  Negative for weakness

## 2025-06-18 NOTE — ED NOTES
Patient ambulated around 1/2 of nurses station O2, 100% - 97%, pulse was 80 -84.  Patient tolerated the walk well

## 2025-06-19 LAB
EKG ATRIAL RATE: 69 BPM
EKG P AXIS: 60 DEGREES
EKG P-R INTERVAL: 142 MS
EKG Q-T INTERVAL: 392 MS
EKG QRS DURATION: 82 MS
EKG QTC CALCULATION (BAZETT): 420 MS
EKG R AXIS: 70 DEGREES
EKG T AXIS: 46 DEGREES
EKG VENTRICULAR RATE: 69 BPM

## 2025-06-19 PROCEDURE — 93010 ELECTROCARDIOGRAM REPORT: CPT | Performed by: INTERNAL MEDICINE

## (undated) DEVICE — CONTAINER,SPEC,PNEUM TUBE,3OZ,STRL PATH: Brand: MEDLINE

## (undated) DEVICE — 1LYRTR 16FR10ML 100%SILI SNAP: Brand: MEDLINE INDUSTRIES, INC.

## (undated) DEVICE — APPLICATOR MEDICATED 26 CC SOLUTION HI LT ORNG CHLORAPREP

## (undated) DEVICE — 4-PORT MANIFOLD: Brand: NEPTUNE 2

## (undated) DEVICE — SUTURE VICRYL + SZ 0 L36IN ABSRB VLT L36MM CT-1 1/2 CIR VCP346H

## (undated) DEVICE — NEPTUNE E-SEP SMOKE EVACUATION PENCIL, COATED, 70MM BLADE, PUSH BUTTON SWITCH: Brand: NEPTUNE E-SEP

## (undated) DEVICE — HYPODERMIC SAFETY NEEDLE: Brand: MAGELLAN

## (undated) DEVICE — SUTURE ABSORBABLE MONOFILAMENT 0 TP1 60 IN VIO PDS + PDP991G

## (undated) DEVICE — DOUBLE BASIN SET: Brand: MEDLINE INDUSTRIES, INC.

## (undated) DEVICE — CESAREAN BIRTH PACK: Brand: MEDLINE INDUSTRIES, INC.

## (undated) DEVICE — Device: Brand: PORTEX

## (undated) DEVICE — DRESSING FOAM POST OPERATIVE 4X10 IN MEPILEX BORDER AG

## (undated) DEVICE — 34" SINGLE PATIENT USE HOVERMATT BREATHABLE: Brand: SINGLE PATIENT USE HOVERMATT

## (undated) DEVICE — VACUETTE® TUBE 6 ML Z SERUM CLOT ACTIVATOR 13X100 RED CAP-BLACK RING, NON-RIDGED: Brand: VACUETTE

## (undated) DEVICE — STRIP,CLOSURE,WOUND,MEDI-STRIP,1/2X4: Brand: MEDLINE

## (undated) DEVICE — SOLUTION IRRIG 500ML 0.9% SOD CHLO USP POUR PLAS BTL

## (undated) DEVICE — STAPLER SKIN SQ 30 ABSRB STPL DISP INSORB ORDER VIA PHONE OR EMAIL

## (undated) DEVICE — GLOVE SURG SZ 65 L12IN FNGR THK79MIL GRN LTX FREE

## (undated) DEVICE — CONTAINER SPEC 64OZ POLYPR PATH SNAP LOK CAP W/ LID

## (undated) DEVICE — BAG SPECIMEN BIOHAZARD 6IN X 9IN

## (undated) DEVICE — SWABSTCK, BENZOIN TINCTURE, 1/PK, STRL: Brand: APLICARE

## (undated) DEVICE — SYRINGE MED 10ML LUERLOCK TIP W/O SFTY DISP

## (undated) DEVICE — GLOVE SURG SZ 65 THK91MIL LTX FREE SYN POLYISOPRENE